# Patient Record
Sex: FEMALE | Race: BLACK OR AFRICAN AMERICAN | Employment: UNEMPLOYED | URBAN - METROPOLITAN AREA
[De-identification: names, ages, dates, MRNs, and addresses within clinical notes are randomized per-mention and may not be internally consistent; named-entity substitution may affect disease eponyms.]

---

## 2017-10-24 ENCOUNTER — HOSPITAL ENCOUNTER (OUTPATIENT)
Dept: ULTRASOUND IMAGING | Age: 32
Discharge: HOME | End: 2017-10-24
Attending: FAMILY MEDICINE
Payer: MEDICARE

## 2017-10-24 DIAGNOSIS — R19.09 ABDOMINAL WALL MASS OF SUPRAPUBIC REGION: ICD-10-CM

## 2017-10-24 PROCEDURE — 76856 US EXAM PELVIC COMPLETE: CPT

## 2021-09-02 ENCOUNTER — TRANSCRIBE ORDER (OUTPATIENT)
Dept: SCHEDULING | Age: 36
End: 2021-09-02

## 2021-09-02 DIAGNOSIS — Z93.1 FEEDING BY G-TUBE (HCC): Primary | ICD-10-CM

## 2021-09-02 DIAGNOSIS — R63.30 FEEDING DIFFICULTY: ICD-10-CM

## 2021-09-10 ENCOUNTER — HOSPITAL ENCOUNTER (OUTPATIENT)
Dept: INTERVENTIONAL RADIOLOGY/VASCULAR | Age: 36
Discharge: HOME | End: 2021-09-10
Attending: INTERNAL MEDICINE | Admitting: RADIOLOGY
Payer: COMMERCIAL

## 2021-09-10 VITALS
TEMPERATURE: 98.2 F | DIASTOLIC BLOOD PRESSURE: 80 MMHG | RESPIRATION RATE: 18 BRPM | OXYGEN SATURATION: 100 % | HEART RATE: 86 BPM | SYSTOLIC BLOOD PRESSURE: 108 MMHG

## 2021-09-10 DIAGNOSIS — R63.30 FEEDING DIFFICULTY: ICD-10-CM

## 2021-09-10 DIAGNOSIS — Z93.1 FEEDING BY G-TUBE (HCC): ICD-10-CM

## 2021-09-10 PROCEDURE — 74011000250 HC RX REV CODE- 250: Performed by: RADIOLOGY

## 2021-09-10 PROCEDURE — 49452 REPLACE G-J TUBE PERC: CPT

## 2021-09-10 PROCEDURE — 74011000636 HC RX REV CODE- 636: Performed by: RADIOLOGY

## 2021-09-10 RX ORDER — LIDOCAINE HYDROCHLORIDE 20 MG/ML
JELLY TOPICAL AS NEEDED
Status: DISCONTINUED | OUTPATIENT
Start: 2021-09-10 | End: 2021-09-10 | Stop reason: HOSPADM

## 2021-09-10 RX ORDER — IBUPROFEN 600 MG/1
600 TABLET ORAL
COMMUNITY
End: 2022-05-17

## 2021-09-10 RX ORDER — CLONAZEPAM 1 MG/1
1 TABLET ORAL 4 TIMES DAILY
COMMUNITY

## 2021-09-10 RX ORDER — UREA 10 %
2 LOTION (ML) TOPICAL
COMMUNITY

## 2021-09-10 RX ORDER — TRAZODONE HYDROCHLORIDE 50 MG/1
50 TABLET ORAL
COMMUNITY
End: 2022-05-09 | Stop reason: ALTCHOICE

## 2021-09-10 RX ADMIN — IOPAMIDOL 10 ML: 612 INJECTION, SOLUTION INTRAVENOUS at 14:50

## 2021-09-10 RX ADMIN — LIDOCAINE HYDROCHLORIDE 5 ML: 20 JELLY TOPICAL at 14:40

## 2021-09-11 NOTE — PROGRESS NOTES
Pt tolerated gastrostomy tube change without problems; consent obtained from Pj Arias, her legal guardian. VSS. Pt transferred to her w/c using the LIFT team service. Pt discharged to home with belongings via her w/c with her aunt.

## 2021-09-14 ENCOUNTER — TRANSCRIBE ORDER (OUTPATIENT)
Dept: SCHEDULING | Age: 36
End: 2021-09-14

## 2021-09-14 DIAGNOSIS — R19.02 LEFT UPPER QUADRANT ABDOMINAL MASS: Primary | ICD-10-CM

## 2021-09-23 ENCOUNTER — HOSPITAL ENCOUNTER (OUTPATIENT)
Dept: ULTRASOUND IMAGING | Age: 36
Discharge: HOME | End: 2021-09-23
Attending: FAMILY MEDICINE
Payer: COMMERCIAL

## 2021-09-23 DIAGNOSIS — R19.02 LEFT UPPER QUADRANT ABDOMINAL MASS: ICD-10-CM

## 2021-09-23 PROCEDURE — 76700 US EXAM ABDOM COMPLETE: CPT

## 2021-12-02 ENCOUNTER — TRANSCRIBE ORDER (OUTPATIENT)
Dept: SCHEDULING | Age: 36
End: 2021-12-02

## 2021-12-02 DIAGNOSIS — R19.02 ABDOMINAL MASS, LEFT UPPER QUADRANT: Primary | ICD-10-CM

## 2021-12-07 ENCOUNTER — HOSPITAL ENCOUNTER (OUTPATIENT)
Dept: CT IMAGING | Age: 36
Discharge: HOME | End: 2021-12-07
Attending: FAMILY MEDICINE
Payer: COMMERCIAL

## 2021-12-07 DIAGNOSIS — R19.02 ABDOMINAL MASS, LEFT UPPER QUADRANT: ICD-10-CM

## 2021-12-07 PROCEDURE — 74011000250 HC RX REV CODE- 250: Performed by: RADIOLOGY

## 2021-12-07 PROCEDURE — 74011000636 HC RX REV CODE- 636: Performed by: RADIOLOGY

## 2021-12-07 PROCEDURE — 74177 CT ABD & PELVIS W/CONTRAST: CPT

## 2021-12-07 RX ORDER — BARIUM SULFATE 20 MG/ML
450 SUSPENSION ORAL
Status: COMPLETED | OUTPATIENT
Start: 2021-12-07 | End: 2021-12-07

## 2021-12-07 RX ADMIN — IOPAMIDOL 100 ML: 755 INJECTION, SOLUTION INTRAVENOUS at 14:31

## 2021-12-07 RX ADMIN — BARIUM SULFATE 450 ML: 20 SUSPENSION ORAL at 14:31

## 2021-12-14 ENCOUNTER — HOSPITAL ENCOUNTER (OUTPATIENT)
Dept: NUTRITION | Age: 36
Discharge: HOME | End: 2021-12-14
Payer: COMMERCIAL

## 2021-12-14 PROCEDURE — 97802 MEDICAL NUTRITION INDIV IN: CPT | Performed by: DIETITIAN, REGISTERED

## 2021-12-14 NOTE — PROGRESS NOTES
Reta Childs was informed of the inherent limitations of a virtual visit,  and has consented to a virtual therapy visit on 2021. Information regarding emergency contact information for this patient during this visit is to contact:  Champ Macdonald in addition to calling 911. The patient was informed that at any time during the virtual visit, they can decide to stop the virtual visit. The patient verified that they are physically located in the [facility] for this virtual visit. [address] [facility]     Nutrition Assessment - Medical Nutrition Therapy   Outpatient Initial Evaluation         Patient Name: Reta Childs : 1985   Treatment Diagnosis: R63.5 (ICD-10-CM) - Weight gain   Referral Source: modesta Wynn* Start of Care [facility]): 2021     In time: 2:30pm           Out time:   3:30pm   Total Treatment Time (min):   60     Gender: female Age: 31 y.o. Ht: 67 in Wt: 135 (2019) lb  kg   BMI: 21.1 BMR   Male  BMR Female 1406     Past Medical History: There is no problem list on file for this patient. Pertinent Medications:     Current Outpatient Medications:     clonazePAM (KlonoPIN) 1 mg tablet, Take 1 mg by mouth four (4) times daily. , Disp: , Rfl:     traZODone (DESYREL) 50 mg tablet, Take 50 mg by mouth nightly. At 0330, Disp: , Rfl:     melatonin 1 mg tablet, Take 2 mg by mouth nightly., Disp: , Rfl:     ibuprofen (MOTRIN) 600 mg tablet, Take 600 mg by mouth as needed for Pain (for menstrual cramping). , Disp: , Rfl:     traZODone (DESYREL) 100 mg tablet, Take 100 mg by mouth nightly., Disp: , Rfl:     baclofen (LIORESAL) 20 mg tablet, Take 1 mg by mouth four (4) times daily. , Disp: , Rfl:   No longer getting miralax 1 time per week.  Reports no issues with constipation   Biochemical Data:   No results found for: HBA1C, ALU1CSJD, BUU9MBLB  Lab Results   Component Value Date/Time    Sodium 141 2016 08:27 PM Potassium 4.1 04/27/2016 08:27 PM    Chloride 105 04/27/2016 08:27 PM    CO2 27 04/27/2016 08:27 PM    Anion gap 9 04/27/2016 08:27 PM    Glucose 76 04/27/2016 08:27 PM    BUN 16 04/27/2016 08:27 PM    Creatinine 0.49 (L) 04/27/2016 08:27 PM    BUN/Creatinine ratio 33 (H) 04/27/2016 08:27 PM    GFR est AA >60 04/27/2016 08:27 PM    GFR est non-AA >60 04/27/2016 08:27 PM    Calcium 9.0 04/27/2016 08:27 PM    Bilirubin, total 0.4 04/27/2016 08:27 PM    Alk. phosphatase 63 04/27/2016 08:27 PM    Protein, total 8.0 04/27/2016 08:27 PM    Albumin 3.5 04/27/2016 08:27 PM    Globulin 4.5 (H) 04/27/2016 08:27 PM    A-G Ratio 0.8 (L) 04/27/2016 08:27 PM    ALT (SGPT) 24 04/27/2016 08:27 PM    AST (SGOT) 18 04/27/2016 08:27 PM     No results found for: CHOL, CHOLPOCT, CHOLX, CHLST, CHOLV, HDL, HDLPOC, HDLP, LDL, LDLCPOC, LDLC, DLDLP, VLDLC, VLDL, TGLX, TRIGL, TRIGP, TGLPOCT, CHHD, CHHDX     Assessment:   Kwaku Ko her aunt is with her. Pt is a 34yo femal here with nurse. On Jevity 1.2 for years (since 2001 or longer). notable increase in weight. Her 5 aunts are concerned. Weight gain estimated as of 10-15# over past 2 years. Weight stable prior to that. Clothing size has increased to XL from L and aunt notes her arms are larger than they were previously. Activity: wheel chair bound  [facility] and scootch around on her own. Has not done this in many years. Moves around he legs when in bed. No weight measured since 2019. Recently large fibroids found on CT. Unknown amount of weight from fibroids. IBW = 135#     Food & Nutrition:   Current nutrition = 7 boxes of Jevity 1.2 per day  Sometimes taking apple sauce by moutn just to taste. Sometimes getting only 6 in a day. Aunt notes this just started happening about 5-6 days ago. Pt will vocalize that she does not want any more according to aunt.      7 cans per day =   Calories = 1995 kcal  Protein = 92.4g  Carbohydrate =189.7g  Fiber = 26.6g  Fat=99.4g    6 cans per day =   Calories = 1710 kcal  Protein = 79.2g  Carbohydrate = 162.6g  Fiber = 25.8g   Fat = 85.2g       Estimate Needs = Equation( [] MSJ ; [x]  HBE (with IBW); [] Hyun; [] other)  * Activity Factor 1.2-1.3)   Calories: 2954-9632 Protein: 85 Carbs: 149 Fat: 85   Kcal/day  g/day  g/day  g/day        percent: 20  35  45               Nutrition Diagnosis Predicted Excessive energy intake R/T overfeeding of enteral nutrition AEB pt signaling to stop pump before full 7 cans, estimated weight increase (reported by aunt). Nutrition Intervention &  Education: Calculated nutritional needs using HBE with IBW (no recent weight available. Reported weight gain without measurement) x AF (1.2-1.3)   AF based on observed movements and physical limitations of pt during virtual session. Caregiver (aunt) noted minimal use of one arm. Estimated NEVIN = [id number]    Recommend reducing intake form 7 cans per day to 6 cans per day to meet energy needs and with input from caregiver stating that pt has been signaling she is full prior to 7th can for past week. 6 cans are already being provided for past week by caregiver without issue. Recommend continuing this change. Request new labs and weight to be able to better determine nutritional needs of pt beyond this adjustment. Handouts Provided: []  Carbohydrates  []  Protein  []  Non-starchy Vegatbles  []  Food Label  []  Meal and Snack Ideas  []  Food Journals []  Diabetes  []  Cholesterol  []  Sodium  []  Gen Nutr Guidelines  []  SBGM Guidelines  []  Others:   Information Reviewed with:  Aunt and pt   Readiness to Change Stage: []  Pre-contemplative    []  Contemplative  []  Preparation               [x]  Action  (already made this change prior to session)                []  Maintenance   Potential Barriers to Learning:                      []  Decline in memory    []  Language barrier   []  Other:  []  Emotional []  Limited mobility     []  None  []  Lack of motivation     [x] Vision, hearing or cognitive impairment (pt)   Expected Compliance: Change has already been made by aunt based on her perceived desires of pt. Nutritional Goal - To promote lifestyle changes to result in:    []  Weight loss  []  Improved diabetic control  []  Decreased cholesterol levels  []  Decreased blood pressure  [x]  Weight maintenance []  Preventing any interactions associated with food allergies  []  Adequate weight gain toward goal weight  []  Other:        Patient Goals:   No recent labs to determine if formulary change is needed. No recent weight. Using last known weight (which is equal to [place] IBW)    6 cans per day =   Calories = 1710 kcal  Protein = 79.2g  Carbohydrate = 241.2g   Fiber = 25.8g  Fat = 85.2g    Request new labs and weight to be able to better determine nutritional needs of pt beyond this adjustment. Dietitian Signature: Iris Hendrix MS, RD, CSSD Date: 12/14/2021   Follow-up: PRN Time: 2:39 PM   Jacobo Li is a 31 y.o. female being evaluated by a Virtual Visit (video visit) encounter to address concerns as mentioned above. A caregiver was present when appropriate. Due to this being a TeleHealth encounter (During [facility]-01 public health emergency), evaluation of the following areas was limited: Nutrition Focused Physical Exam. Pursuant to the emergency declaration under the [address] [place] [address], [address] [place] [address] authority and the [employer] Act, this Virtual Visit was conducted with patient's (and/or legal guardian's) consent, to reduce the risk of exposure to COVID-19 and provide necessary medical care. Services were provided through a video synchronous discussion virtually to substitute for in-person encounter. --Kishor Bey on 12/14/2021 at 2:39 PM    An electronic signature was used to authenticate this note.

## 2022-03-01 ENCOUNTER — APPOINTMENT (OUTPATIENT)
Dept: CT IMAGING | Age: 37
End: 2022-03-01
Attending: EMERGENCY MEDICINE
Payer: COMMERCIAL

## 2022-03-01 ENCOUNTER — HOSPITAL ENCOUNTER (EMERGENCY)
Age: 37
Discharge: HOME | End: 2022-03-01
Attending: EMERGENCY MEDICINE
Payer: COMMERCIAL

## 2022-03-01 VITALS
OXYGEN SATURATION: 100 % | DIASTOLIC BLOOD PRESSURE: 101 MMHG | RESPIRATION RATE: 18 BRPM | TEMPERATURE: 98.7 F | HEART RATE: 79 BPM | SYSTOLIC BLOOD PRESSURE: 122 MMHG

## 2022-03-01 DIAGNOSIS — R10.9 ACUTE ABDOMINAL PAIN: Primary | ICD-10-CM

## 2022-03-01 LAB
ALBUMIN SERPL-MCNC: 3 G/DL (ref 3.5–5)
ALBUMIN/GLOB SERPL: 0.6 {RATIO} (ref 1.1–2.2)
ALP SERPL-CCNC: 67 U/L (ref 45–117)
ALT SERPL-CCNC: 23 U/L (ref 12–78)
ANION GAP SERPL CALC-SCNC: 4 MMOL/L (ref 5–15)
AST SERPL-CCNC: 18 U/L (ref 15–37)
BASE EXCESS BLD CALC-SCNC: 0.7 MMOL/L
BASOPHILS # BLD: 0 K/UL (ref 0–0.1)
BASOPHILS NFR BLD: 1 % (ref 0–1)
BILIRUB SERPL-MCNC: 0.4 MG/DL (ref 0.2–1)
BUN SERPL-MCNC: 11 MG/DL (ref 6–20)
BUN/CREAT SERPL: 26 (ref 12–20)
CA-I BLD-MCNC: 1.3 MMOL/L (ref 1.12–1.32)
CALCIUM SERPL-MCNC: 9.5 MG/DL (ref 8.5–10.1)
CHLORIDE BLD-SCNC: 104 MMOL/L (ref 100–108)
CHLORIDE SERPL-SCNC: 106 MMOL/L (ref 97–108)
CO2 BLD-SCNC: 27 MMOL/L (ref 19–24)
CO2 SERPL-SCNC: 26 MMOL/L (ref 21–32)
CREAT SERPL-MCNC: 0.43 MG/DL (ref 0.55–1.02)
CREAT UR-MCNC: <0.3 MG/DL (ref 0.6–1.3)
DIFFERENTIAL METHOD BLD: ABNORMAL
EOSINOPHIL # BLD: 0.1 K/UL (ref 0–0.4)
EOSINOPHIL NFR BLD: 2 % (ref 0–7)
ERYTHROCYTE [DISTWIDTH] IN BLOOD BY AUTOMATED COUNT: 16.6 % (ref 11.5–14.5)
GLOBULIN SER CALC-MCNC: 4.7 G/DL (ref 2–4)
GLUCOSE BLD STRIP.AUTO-MCNC: 88 MG/DL (ref 74–106)
GLUCOSE SERPL-MCNC: 92 MG/DL (ref 65–100)
HCO3 BLDA-SCNC: 26 MMOL/L
HCT VFR BLD AUTO: 34.4 % (ref 35–47)
HGB BLD-MCNC: 10.7 G/DL (ref 11.5–16)
IMM GRANULOCYTES # BLD AUTO: 0 K/UL (ref 0–0.04)
IMM GRANULOCYTES NFR BLD AUTO: 0 % (ref 0–0.5)
LACTATE BLD-SCNC: 1.34 MMOL/L (ref 0.4–2)
LIPASE SERPL-CCNC: 82 U/L (ref 73–393)
LYMPHOCYTES # BLD: 1.4 K/UL (ref 0.8–3.5)
LYMPHOCYTES NFR BLD: 34 % (ref 12–49)
MCH RBC QN AUTO: 24.8 PG (ref 26–34)
MCHC RBC AUTO-ENTMCNC: 31.1 G/DL (ref 30–36.5)
MCV RBC AUTO: 79.8 FL (ref 80–99)
MONOCYTES # BLD: 0.6 K/UL (ref 0–1)
MONOCYTES NFR BLD: 16 % (ref 5–13)
NEUTS SEG # BLD: 1.9 K/UL (ref 1.8–8)
NEUTS SEG NFR BLD: 47 % (ref 32–75)
NRBC # BLD: 0 K/UL (ref 0–0.01)
NRBC BLD-RTO: 0 PER 100 WBC
PCO2 BLDV: 44.9 MMHG (ref 41–51)
PH BLDV: 7.38 [PH] (ref 7.32–7.42)
PLATELET # BLD AUTO: 304 K/UL (ref 150–400)
PMV BLD AUTO: 10.7 FL (ref 8.9–12.9)
PO2 BLDV: 27 MMHG (ref 25–40)
POTASSIUM BLD-SCNC: 5 MMOL/L (ref 3.5–5.5)
POTASSIUM SERPL-SCNC: 4.1 MMOL/L (ref 3.5–5.1)
PROT SERPL-MCNC: 7.7 G/DL (ref 6.4–8.2)
RBC # BLD AUTO: 4.31 M/UL (ref 3.8–5.2)
SODIUM BLD-SCNC: 141 MMOL/L (ref 136–145)
SODIUM SERPL-SCNC: 136 MMOL/L (ref 136–145)
SPECIMEN SITE: ABNORMAL
WBC # BLD AUTO: 4 K/UL (ref 3.6–11)

## 2022-03-01 PROCEDURE — 36415 COLL VENOUS BLD VENIPUNCTURE: CPT

## 2022-03-01 PROCEDURE — 74011250636 HC RX REV CODE- 250/636: Performed by: EMERGENCY MEDICINE

## 2022-03-01 PROCEDURE — 84132 ASSAY OF SERUM POTASSIUM: CPT

## 2022-03-01 PROCEDURE — 85025 COMPLETE CBC W/AUTO DIFF WBC: CPT

## 2022-03-01 PROCEDURE — 96375 TX/PRO/DX INJ NEW DRUG ADDON: CPT

## 2022-03-01 PROCEDURE — 74177 CT ABD & PELVIS W/CONTRAST: CPT

## 2022-03-01 PROCEDURE — 80053 COMPREHEN METABOLIC PANEL: CPT

## 2022-03-01 PROCEDURE — 74011000636 HC RX REV CODE- 636: Performed by: EMERGENCY MEDICINE

## 2022-03-01 PROCEDURE — C9113 INJ PANTOPRAZOLE SODIUM, VIA: HCPCS | Performed by: EMERGENCY MEDICINE

## 2022-03-01 PROCEDURE — 96374 THER/PROPH/DIAG INJ IV PUSH: CPT

## 2022-03-01 PROCEDURE — 99285 EMERGENCY DEPT VISIT HI MDM: CPT

## 2022-03-01 PROCEDURE — 83690 ASSAY OF LIPASE: CPT

## 2022-03-01 RX ORDER — MORPHINE SULFATE 2 MG/ML
4 INJECTION, SOLUTION INTRAMUSCULAR; INTRAVENOUS ONCE
Status: COMPLETED | OUTPATIENT
Start: 2022-03-01 | End: 2022-03-01

## 2022-03-01 RX ORDER — DICYCLOMINE HYDROCHLORIDE 20 MG/1
20 TABLET ORAL EVERY 6 HOURS
Qty: 20 TABLET | Refills: 0 | Status: SHIPPED | OUTPATIENT
Start: 2022-03-01 | End: 2022-05-25 | Stop reason: SDUPTHER

## 2022-03-01 RX ORDER — ONDANSETRON 2 MG/ML
4 INJECTION INTRAMUSCULAR; INTRAVENOUS ONCE
Status: COMPLETED | OUTPATIENT
Start: 2022-03-01 | End: 2022-03-01

## 2022-03-01 RX ORDER — FAMOTIDINE 20 MG/1
20 TABLET, FILM COATED ORAL 2 TIMES DAILY
Qty: 20 TABLET | Refills: 0 | Status: SHIPPED | OUTPATIENT
Start: 2022-03-01 | End: 2022-03-11

## 2022-03-01 RX ORDER — PANTOPRAZOLE SODIUM 40 MG/10ML
40 INJECTION, POWDER, LYOPHILIZED, FOR SOLUTION INTRAVENOUS
Status: COMPLETED | OUTPATIENT
Start: 2022-03-01 | End: 2022-03-01

## 2022-03-01 RX ADMIN — MORPHINE SULFATE 4 MG: 2 INJECTION, SOLUTION INTRAMUSCULAR; INTRAVENOUS at 13:32

## 2022-03-01 RX ADMIN — PANTOPRAZOLE SODIUM 40 MG: 40 INJECTION, POWDER, FOR SOLUTION INTRAVENOUS at 13:32

## 2022-03-01 RX ADMIN — ONDANSETRON 4 MG: 2 INJECTION INTRAMUSCULAR; INTRAVENOUS at 13:32

## 2022-03-01 RX ADMIN — SODIUM CHLORIDE 500 ML: 9 INJECTION, SOLUTION INTRAVENOUS at 13:32

## 2022-03-01 RX ADMIN — IOPAMIDOL 100 ML: 755 INJECTION, SOLUTION INTRAVENOUS at 14:03

## 2022-03-01 NOTE — DISCHARGE INSTRUCTIONS
It was a pleasure taking care of you at [facility] Emergency Department today. We know that when you come to [facility], you are entrusting us with your health, comfort, and safety. Our physicians and nurses honor that trust, and we truly appreciate the opportunity to care for you and your loved ones. We also value your feedback. If you receive a survey about your Emergency Department experience today, please fill it out. We care about our patients' feedback, and we listen to what you have to say. Thank you!

## 2022-03-01 NOTE — ED TRIAGE NOTES
Patient arriving from home with Aunt (caregiver) who states that patient has had new abdominal pain recently, making patient more irritable than normal.   Patient currently on menstrual cycle with known hx of multiple uterine fibroids. Aunt also mentions that patient has PEG tube, and she has noticed dried blood around stoma. Aunt also would like patient to have a \"physical\" because she has not had one in years.

## 2022-03-01 NOTE — ED PROVIDER NOTES
EMERGENCY DEPARTMENT HISTORY AND PHYSICAL EXAM      Date: 3/1/2022  Patient Name: Zelalem Chatterjee    History of Presenting Illness     Chief Complaint   Patient presents with    Abdominal Pain       History Provided By: Caregiver    HPI: Zelalem Chatterjee, 31 y.o. female with a past medical history significant for cerebral palsy, nonverbal, history of uterine fibroids, medical problems as stated below presents to the ED with cc of severe abdominal pain, distention, discomfort over the last several days. Patient has feeding tube in place and blood has been coming from the tube at times. Patient's abdomen seems more distended and patient seems more constipated as well. She is requiring fleets enemas to help with her bowel movements. She has known fibroids that are very large and have been imaged back in the beginning of December 2021. She called her doctor today and they told her to come emergency room for evaluation. It is very limited due to patient's underlying medical conditions. No other associated symptoms. No other exacerbating or mounting factors. There are no other complaints, changes, or physical findings at this time. PCP: Deejay Rey MD    No current facility-administered medications on file prior to encounter. Current Outpatient Medications on File Prior to Encounter   Medication Sig Dispense Refill    clonazePAM (KlonoPIN) 1 mg tablet Take 1 mg by mouth four (4) times daily.  traZODone (DESYREL) 50 mg tablet Take 50 mg by mouth nightly. At 0330      melatonin 1 mg tablet Take 2 mg by mouth nightly.  ibuprofen (MOTRIN) 600 mg tablet Take 600 mg by mouth as needed for Pain (for menstrual cramping).  traZODone (DESYREL) 100 mg tablet Take 100 mg by mouth nightly.  baclofen (LIORESAL) 20 mg tablet Take 1 mg by mouth four (4) times daily.          Past History     Past Medical History:  Past Medical History:   Diagnosis Date    Aspiration into respiratory tract     Asthma     [facility]     [facility]     Skull fractures ([facility]     @ birth       Past Surgical History:  Past Surgical History:   Procedure Laterality Date    HX GI      feeding tube; nipson to help prevent asipration    IR REPLACE GASTRO/JEJUNO TUBE PERC  9/10/2021       Family History:  No family history on file. Social History:  Social History     Tobacco Use    Smoking status: Never Smoker    Smokeless tobacco: Never Used   Substance Use Topics    Alcohol use: No    Drug use: No       Allergies:  No Known Allergies      Review of Systems   Review of Systems   Constitutional: Negative for chills, diaphoresis, fatigue and fever. HENT: Negative for ear pain and sore throat. Eyes: Negative for pain and redness. Respiratory: Negative for cough and shortness of breath. Cardiovascular: Negative for chest pain and leg swelling. Gastrointestinal: Positive for abdominal distention, abdominal pain and constipation. Negative for diarrhea, nausea and vomiting. Endocrine: Negative for cold intolerance and heat intolerance. Genitourinary: Negative for flank pain and hematuria. Musculoskeletal: Negative for back pain and neck stiffness. Skin: Negative for rash and wound. Neurological: Negative for dizziness, syncope and headaches. All other systems reviewed and are negative. Physical Exam   Physical Exam  Vitals and nursing note reviewed. Constitutional:       General: She is in acute distress. Appearance: She is well-developed. She is not ill-appearing. Comments: Elevated BMI, nonverbal, intermittently moaning, at baseline per caregiver mental status   HENT:      Head: Normocephalic and atraumatic. Mouth/Throat:      Pharynx: No oropharyngeal exudate. Eyes:      Conjunctiva/sclera: Conjunctivae normal.      Pupils: Pupils are equal, round, and reactive to light. Cardiovascular:      Rate and Rhythm: Normal rate and regular rhythm.       Heart sounds: No murmur heard. Pulmonary:      Effort: Pulmonary effort is normal. No respiratory distress. Breath sounds: Normal breath sounds. No wheezing. Abdominal:      General: Bowel sounds are normal. There is distension. Palpations: Abdomen is soft. Tenderness: There is generalized abdominal tenderness. There is no right CVA tenderness, left CVA tenderness, guarding or rebound. Comments: G-tube in place, no bleeding appreciated on my exam   Musculoskeletal:         General: No deformity. Normal range of motion. Cervical back: Normal range of motion. Skin:     General: Skin is warm and dry. Findings: No rash. Neurological:      Mental Status: She is alert and oriented to person, place, and time. Coordination: Coordination normal.   Psychiatric:         Behavior: Behavior normal.         Diagnostic Study Results     Labs -     Recent Results (from the past 24 hour(s))   CBC WITH AUTOMATED DIFF    Collection Time: 03/01/22  1:24 PM   Result Value Ref Range    WBC 4.0 3.6 - 11.0 K/uL    RBC 4.31 3.80 - 5.20 M/uL    HGB 10.7 (L) 11.5 - 16.0 g/dL    HCT 34.4 (L) 35.0 - 47.0 %    MCV 79.8 (L) 80.0 - 99.0 FL    MCH 24.8 (L) 26.0 - 34.0 PG    MCHC 31.1 30.0 - 36.5 g/dL    RDW 16.6 (H) 11.5 - 14.5 %    PLATELET 240 319 - 158 K/uL    MPV 10.7 8.9 - 12.9 FL    NRBC 0.0 0  WBC    ABSOLUTE NRBC 0.00 0.00 - 0.01 K/uL    NEUTROPHILS 47 32 - 75 %    LYMPHOCYTES 34 12 - 49 %    MONOCYTES 16 (H) 5 - 13 %    EOSINOPHILS 2 0 - 7 %    BASOPHILS 1 0 - 1 %    IMMATURE GRANULOCYTES 0 0.0 - 0.5 %    ABS. NEUTROPHILS 1.9 1.8 - 8.0 K/UL    ABS. LYMPHOCYTES 1.4 0.8 - 3.5 K/UL    ABS. MONOCYTES 0.6 0.0 - 1.0 K/UL    ABS. EOSINOPHILS 0.1 0.0 - 0.4 K/UL    ABS. BASOPHILS 0.0 0.0 - 0.1 K/UL    ABS. IMM.  GRANS. 0.0 0.00 - 0.04 K/UL    DF AUTOMATED     METABOLIC PANEL, COMPREHENSIVE    Collection Time: 03/01/22  1:24 PM   Result Value Ref Range    Sodium 136 136 - 145 mmol/L    Potassium 4.1 3.5 - 5.1 mmol/L    Chloride 106 97 - 108 mmol/L    CO2 26 21 - 32 mmol/L    Anion gap 4 (L) 5 - 15 mmol/L    Glucose 92 65 - 100 mg/dL    BUN 11 6 - 20 MG/DL    Creatinine 0.43 (L) 0.55 - 1.02 MG/DL    BUN/Creatinine ratio 26 (H) 12 - 20      GFR est AA >60 >60 ml/min/1.73m2    GFR est non-AA >60 >60 ml/min/1.73m2    Calcium 9.5 8.5 - 10.1 MG/DL    Bilirubin, total 0.4 0.2 - 1.0 MG/DL    ALT (SGPT) 23 12 - 78 U/L    AST (SGOT) 18 15 - 37 U/L    Alk. phosphatase 67 45 - 117 U/L    Protein, total 7.7 6.4 - 8.2 g/dL    Albumin 3.0 (L) 3.5 - 5.0 g/dL    Globulin 4.7 (H) 2.0 - 4.0 g/dL    A-G Ratio 0.6 (L) 1.1 - 2.2     LIPASE    Collection Time: 03/01/22  1:24 PM   Result Value Ref Range    Lipase 82 73 - 393 U/L   BLOOD GAS,CHEM8,LACTIC ACID POC    Collection Time: 03/01/22  1:26 PM   Result Value Ref Range    Calcium, ionized (POC) 1.30 1.12 - 1.32 mmol/L    BICARBONATE 26 mmol/L    Base excess (POC) 0.7 mmol/L    Sample source VENOUS BLOOD      CO2, POC 27 (H) 19 - 24 MMOL/L    Sodium,  136 - 145 MMOL/L    Potassium, POC 5.0 3.5 - 5.5 MMOL/L    Chloride,  100 - 108 MMOL/L    Glucose, POC 88 74 - 106 MG/DL    Creatinine, POC <0.3 (L) 0.6 - 1.3 MG/DL    Lactic Acid (POC) 1.34 0.40 - 2.00 mmol/L    pH, venous (POC) 7.38 7.32 - 7.42      pCO2, venous (POC) 44.9 41 - 51 MMHG    pO2, venous (POC) 27 25 - 40 mmHg       Radiologic Studies -   CT ABD PELV W CONT   Final Result   No acute findings. Enlarged leiomyomatous uterus. CT Results  (Last 48 hours)               03/01/22 1403  CT ABD PELV W CONT Final result    Impression:  No acute findings. Enlarged leiomyomatous uterus. Narrative:  EXAM: CT ABD PELV W CONT       INDICATION: Patient with history of gastrostomy tube, severe abdominal   distention and pain, constipation, history of significant fibroids       COMPARISON: December 7, 2021        CONTRAST: 100 mL of Isovue-370.        ORAL CONTRAST: Yes       TECHNIQUE:    Following the uneventful intravenous administration of contrast, thin axial   images were obtained through the abdomen and pelvis. Coronal and sagittal   reconstructions were generated. CT dose reduction was achieved through use of a   standardized protocol tailored for this examination and automatic exposure   control for dose modulation. FINDINGS:    LOWER THORAX: Minimal atelectasis or scar in the left lower lobe. LIVER: No mass. BILIARY TREE: Gallbladder is within normal limits. CBD is not dilated. SPLEEN: within normal limits. PANCREAS: No mass or ductal dilatation. ADRENALS: Unremarkable. KIDNEYS: No mass, calculus, or hydronephrosis. STOMACH: Gastrostomy tube balloon in the region of the mid stomach. SMALL BOWEL: No dilatation or wall thickening. COLON: No dilatation or wall thickening. APPENDIX: Not identified. PERITONEUM: No ascites or pneumoperitoneum. RETROPERITONEUM: No lymphadenopathy or aortic aneurysm. REPRODUCTIVE ORGANS: Enlarged leiomyomatous uterus, 21 cm in length. URINARY BLADDER: No mass or calculus. BONES: No destructive bone lesion. ABDOMINAL WALL: No mass or hernia. ADDITIONAL COMMENTS: N/A               CXR Results  (Last 48 hours)    None            Medical Decision Making   I am the first provider for this patient. I reviewed the vital signs, available nursing notes, past medical history, past surgical history, family history and social history. Vital Signs-Reviewed the patient's vital signs. Patient Vitals for the past 12 hrs:   Temp Pulse Resp BP SpO2   03/01/22 1255 98.7 °F (37.1 °C) 79 18 (!) 122/101 100 %       Records Reviewed: Nursing records and medical records reviewed    MDM:  Pt presents with acute abdominal pain; vital signs stable with currently a non-peritoneal exam; DDx includes: Gastroenteritis, hepatitis, pancreatitis, obstruction, appendicitis, viral illness, IBD, diverticulitis, mesenteric ischemia, AAA or descending dissection, ACS, kidney stone. Will get labs, treat symptomatically and obtain serial abdominal exams to determine if a CT is warranted. Will reassess and monitor closely. Provider Notes (Medical Decision Making):   Patient is a 19-year-old female with known cerebral palsy presenting with acute exacerbation of her chronic abdominal pain. Patient does seem to have some dyspepsia, differential includes intestinal colic, gastritis, versus gastric ulcer. Patient has a mild anemia when compared to prior, but I do see no history of any bloody stools and there is only a mild amount of blood that was pulled back through the G-tube. Currently there is no blood appreciated in the tube. At this time, I think starting the patient on a bland her diet and starting the patient on antiacid is prudent and have the patient follow-up as an outpatient with her primary care doctor. If the symptoms continue or worsen, the patient will return to the ER for admission and possible endoscopy. ED Course:   Initial assessment performed. The patients presenting problems have been discussed, and they are in agreement with the care plan formulated and outlined with them. I have encouraged them to ask questions as they arise throughout their visit. Critical Care:  None      Disposition:  5:45 PM  Adelina Veliz's  results have been reviewed with her. She has been counseled regarding her diagnosis. She verbally conveys understanding and agreement of the signs, symptoms, diagnosis, treatment and prognosis and additionally agrees to follow up as recommended with Dr. Todd Rader MD in 24 - 48 hours. She also agrees with the care-plan and conveys that all of her questions have been answered.   I have also put together some discharge instructions for her that include: 1) educational information regarding their diagnosis, 2) how to care for their diagnosis at home, as well a 3) list of reasons why they would want to return to the ED prior to their follow-up appointment, should their condition change. DISCHARGE PLAN:  1. Discharge Medication List as of 3/1/2022  3:44 PM      START taking these medications    Details   dicyclomine (BENTYL) 20 mg tablet Take 1 Tablet by mouth every six (6) hours. , Normal, Disp-20 Tablet, R-0      famotidine (Pepcid) 20 mg tablet Take 1 Tablet by mouth two (2) times a day for 10 days. , Normal, Disp-20 Tablet, R-0         CONTINUE these medications which have NOT CHANGED    Details   clonazePAM (KlonoPIN) 1 mg tablet Take 1 mg by mouth four (4) times daily. , Historical Med      !! traZODone (DESYREL) 50 mg tablet Take 50 mg by mouth nightly. At 0330, Historical Med      melatonin 1 mg tablet Take 2 mg by mouth nightly., Historical Med      ibuprofen (MOTRIN) 600 mg tablet Take 600 mg by mouth as needed for Pain (for menstrual cramping). , Historical Med      !! traZODone (DESYREL) 100 mg tablet Take 100 mg by mouth nightly., Historical Med      baclofen (LIORESAL) 20 mg tablet Take 1 mg by mouth four (4) times daily. , Historical Med       !! - Potential duplicate medications found. Please discuss with provider. 2.   Follow-up Information     Follow up With Specialties Details Why Contact Shavonne Cho MD Family Medicine In 3 days For a follow-up evaluation. [address]  [phone]      [facility] EMERGENCY DEPT Emergency Medicine In 2 days If symptoms worsen [address]  [address] [facility]  [phone]        3. Return to ED if worse     Diagnosis     Clinical Impression:   1. Acute abdominal pain        Attestations:    Gab Maguire MD    Please note that this dictation was completed with [employer], the [employer] voice recognition software. Quite often unanticipated grammatical, syntax, homophones, and other interpretive errors are inadvertently transcribed by the computer software. Please disregard these errors.   Please excuse any errors that have escaped final proofreading. Thank you.

## 2022-03-14 ENCOUNTER — TELEPHONE (OUTPATIENT)
Dept: NUTRITION | Age: 37
End: 2022-03-14

## 2022-03-14 NOTE — TELEPHONE ENCOUNTER
Pt's Aunt Caregiver LVM previously for RD. Aunt confirmed pt's  and name. RD returned phone call. Pt recently seen in ER 3/1/22 for abdominal pain. No wt taken. New medications from that visit: Bentyle and Pepcid,   Current Outpatient Medications   Medication Instructions    baclofen (LIORESAL) 1 mg, Oral, 4 TIMES DAILY    clonazePAM (KLONOPIN) 1 mg, Oral, 4 TIMES DAILY    dicyclomine (BENTYL) 20 mg, Oral, EVERY 6 HOURS    ibuprofen (MOTRIN) 600 mg, Oral, AS NEEDED    melatonin 2 mg, Oral, EVERY BEDTIME    traZODone (DESYREL) 100 mg, Oral, EVERY BEDTIME    traZODone (DESYREL) 50 mg, Oral, EVERY BEDTIME, At 0330        Aunt giving her chicken broth through tube 400mL low sodium with water. Continues to have issues with constipation. Previously reduced from 7 cans per day to 6 cans. Past week she has not been getting 6 can. Getting 2 cans for past few days. Starting yesterday able to tolerate 4 cans per day. Anemia noted during ER visit. 6 cans per day would provide >100% RDA for iron . Aunt notes concern for her iron and A1C. No results found for: IRON, FE, TIBC, IBCT, PSAT, FERR  Lab Results   Component Value Date/Time    WBC 4.0 2022 01:24 PM    HGB 10.7 (L) 2022 01:24 PM    HCT 34.4 (L) 2022 01:24 PM    PLATELET 210  01:24 PM    MCV 79.8 (L) 2022 01:24 PM     Labs from PCP getting faxed over for RD to review. Nasir Medina continues to notice blood around her tube, but notes it is less than previously. No blood in diapers. Will review for formulary recommendations.      Ewelina Villarreal, MS, RD, CSSD

## 2022-03-18 ENCOUNTER — TELEPHONE (OUTPATIENT)
Dept: NUTRITION | Age: 37
End: 2022-03-18

## 2022-03-18 NOTE — TELEPHONE ENCOUNTER
Confirmed name and  with aunt  Pt still experiencing stomach distention and bloating. Aunt notes it stays bloated. Will be following up with GI soon. Continues having constipation. Only able to do 4 cans per day currently. Chicken broth and water. 400ml before and after each feeding. Now giving Pedialyte when feeling overly full. Pt has always needed help with bowel movement. Currently using miralax 3 times per week. Pt can take miralax every other day and up to 1 time per day. Recommend liquid iron supplement every other day for low iron. Possible recheck in 6 weeks. Recommend liquid vitamin D supplement as well based on last labs (media section of chart). Lab Results   Component Value Date/Time    WBC 4.0 2022 01:24 PM    HGB 10.7 (L) 2022 01:24 PM    HCT 34.4 (L) 2022 01:24 PM    PLATELET 789  01:24 PM    MCV 79.8 (L) 2022 01:24 PM     Continue working towards 6 cans per day based on pt's tolerance. RD will follow up as needed.     Ewelina Medina, MS, RD, CSSD

## 2022-05-09 ENCOUNTER — APPOINTMENT (OUTPATIENT)
Dept: GENERAL RADIOLOGY | Age: 37
DRG: 871 | End: 2022-05-09
Attending: EMERGENCY MEDICINE
Payer: MEDICARE

## 2022-05-09 ENCOUNTER — HOSPITAL ENCOUNTER (INPATIENT)
Age: 37
LOS: 8 days | Discharge: HOME | DRG: 871 | End: 2022-05-17
Attending: EMERGENCY MEDICINE | Admitting: INTERNAL MEDICINE
Payer: MEDICARE

## 2022-05-09 DIAGNOSIS — J18.9 COMMUNITY ACQUIRED PNEUMONIA OF LEFT LOWER LOBE OF LUNG: ICD-10-CM

## 2022-05-09 DIAGNOSIS — A41.9 SEPSIS WITHOUT ACUTE ORGAN DYSFUNCTION, DUE TO UNSPECIFIED ORGANISM (HCC): Primary | ICD-10-CM

## 2022-05-09 DIAGNOSIS — R50.9 FEBRILE ILLNESS, ACUTE: ICD-10-CM

## 2022-05-09 LAB
ALBUMIN SERPL-MCNC: 2.9 G/DL (ref 3.5–5)
ALBUMIN/GLOB SERPL: 0.6 {RATIO} (ref 1.1–2.2)
ALP SERPL-CCNC: 65 U/L (ref 45–117)
ALT SERPL-CCNC: 19 U/L (ref 12–78)
ANION GAP SERPL CALC-SCNC: 7 MMOL/L (ref 5–15)
APPEARANCE UR: ABNORMAL
AST SERPL-CCNC: 20 U/L (ref 15–37)
B PERT DNA SPEC QL NAA+PROBE: NOT DETECTED
BACTERIA URNS QL MICRO: ABNORMAL /HPF
BASE DEFICIT BLD-SCNC: 4.1 MMOL/L
BASOPHILS # BLD: 0 K/UL (ref 0–0.1)
BASOPHILS NFR BLD: 0 % (ref 0–1)
BILIRUB SERPL-MCNC: 1 MG/DL (ref 0.2–1)
BILIRUB UR QL CFM: NEGATIVE
BORDETELLA PARAPERTUSSIS PCR, BORPAR: NOT DETECTED
BUN SERPL-MCNC: 10 MG/DL (ref 6–20)
BUN/CREAT SERPL: 16 (ref 12–20)
C PNEUM DNA SPEC QL NAA+PROBE: NOT DETECTED
CA-I BLD-MCNC: 1.15 MMOL/L (ref 1.12–1.32)
CALCIUM SERPL-MCNC: 8.7 MG/DL (ref 8.5–10.1)
CHLORIDE BLD-SCNC: 104 MMOL/L (ref 100–108)
CHLORIDE SERPL-SCNC: 105 MMOL/L (ref 97–108)
CO2 BLD-SCNC: 19 MMOL/L (ref 19–24)
CO2 SERPL-SCNC: 22 MMOL/L (ref 21–32)
COLOR UR: ABNORMAL
COVID-19 RAPID TEST, COVR: NOT DETECTED
CREAT SERPL-MCNC: 0.61 MG/DL (ref 0.55–1.02)
CREAT UR-MCNC: 0.5 MG/DL (ref 0.6–1.3)
DIFFERENTIAL METHOD BLD: ABNORMAL
EOSINOPHIL # BLD: 0 K/UL (ref 0–0.4)
EOSINOPHIL NFR BLD: 0 % (ref 0–7)
EPITH CASTS URNS QL MICRO: ABNORMAL /LPF
ERYTHROCYTE [DISTWIDTH] IN BLOOD BY AUTOMATED COUNT: 17.6 % (ref 11.5–14.5)
FLUAV AG NPH QL IA: NEGATIVE
FLUAV H1 2009 PAND RNA SPEC QL NAA+PROBE: NOT DETECTED
FLUAV H1 RNA SPEC QL NAA+PROBE: NOT DETECTED
FLUAV H3 RNA SPEC QL NAA+PROBE: NOT DETECTED
FLUAV SUBTYP SPEC NAA+PROBE: NOT DETECTED
FLUBV AG NOSE QL IA: NEGATIVE
FLUBV RNA SPEC QL NAA+PROBE: NOT DETECTED
GLOBULIN SER CALC-MCNC: 4.7 G/DL (ref 2–4)
GLUCOSE BLD STRIP.AUTO-MCNC: 104 MG/DL (ref 74–106)
GLUCOSE SERPL-MCNC: 116 MG/DL (ref 65–100)
GLUCOSE UR STRIP.AUTO-MCNC: NEGATIVE MG/DL
HADV DNA SPEC QL NAA+PROBE: NOT DETECTED
HCG SERPL QL: NEGATIVE
HCO3 BLDA-SCNC: 19 MMOL/L
HCOV 229E RNA SPEC QL NAA+PROBE: NOT DETECTED
HCOV HKU1 RNA SPEC QL NAA+PROBE: NOT DETECTED
HCOV NL63 RNA SPEC QL NAA+PROBE: NOT DETECTED
HCOV OC43 RNA SPEC QL NAA+PROBE: NOT DETECTED
HCT VFR BLD AUTO: 34.8 % (ref 35–47)
HGB BLD-MCNC: 10.8 G/DL (ref 11.5–16)
HGB UR QL STRIP: ABNORMAL
HMPV RNA SPEC QL NAA+PROBE: NOT DETECTED
HPIV1 RNA SPEC QL NAA+PROBE: NOT DETECTED
HPIV2 RNA SPEC QL NAA+PROBE: NOT DETECTED
HPIV3 RNA SPEC QL NAA+PROBE: NOT DETECTED
HPIV4 RNA SPEC QL NAA+PROBE: NOT DETECTED
IMM GRANULOCYTES # BLD AUTO: 0 K/UL (ref 0–0.04)
IMM GRANULOCYTES NFR BLD AUTO: 0 % (ref 0–0.5)
KETONES UR QL STRIP.AUTO: NEGATIVE MG/DL
LACTATE BLD-SCNC: 0.87 MMOL/L (ref 0.4–2)
LEUKOCYTE ESTERASE UR QL STRIP.AUTO: ABNORMAL
LYMPHOCYTES # BLD: 0.6 K/UL (ref 0.8–3.5)
LYMPHOCYTES NFR BLD: 7 % (ref 12–49)
M PNEUMO DNA SPEC QL NAA+PROBE: NOT DETECTED
MCH RBC QN AUTO: 25.1 PG (ref 26–34)
MCHC RBC AUTO-ENTMCNC: 31 G/DL (ref 30–36.5)
MCV RBC AUTO: 80.9 FL (ref 80–99)
MONOCYTES # BLD: 0.9 K/UL (ref 0–1)
MONOCYTES NFR BLD: 10 % (ref 5–13)
NEUTS SEG # BLD: 7.5 K/UL (ref 1.8–8)
NEUTS SEG NFR BLD: 83 % (ref 32–75)
NITRITE UR QL STRIP.AUTO: POSITIVE
NRBC # BLD: 0 K/UL (ref 0–0.01)
NRBC BLD-RTO: 0 PER 100 WBC
PCO2 BLDV: 27.5 MMHG (ref 41–51)
PH BLDV: 7.44 [PH] (ref 7.32–7.42)
PH UR STRIP: 6.5 [PH] (ref 5–8)
PLATELET # BLD AUTO: 284 K/UL (ref 150–400)
PMV BLD AUTO: 11.2 FL (ref 8.9–12.9)
PO2 BLDV: 45 MMHG (ref 25–40)
POTASSIUM BLD-SCNC: 3.3 MMOL/L (ref 3.5–5.5)
POTASSIUM SERPL-SCNC: 3.7 MMOL/L (ref 3.5–5.1)
PROT SERPL-MCNC: 7.6 G/DL (ref 6.4–8.2)
PROT UR STRIP-MCNC: 300 MG/DL
RBC # BLD AUTO: 4.3 M/UL (ref 3.8–5.2)
RBC #/AREA URNS HPF: ABNORMAL /HPF (ref 0–5)
RBC MORPH BLD: ABNORMAL
RSV RNA SPEC QL NAA+PROBE: NOT DETECTED
RV+EV RNA SPEC QL NAA+PROBE: NOT DETECTED
SARS-COV-2 PCR, COVPCR: NOT DETECTED
SODIUM BLD-SCNC: 136 MMOL/L (ref 136–145)
SODIUM SERPL-SCNC: 134 MMOL/L (ref 136–145)
SOURCE, COVRS: NORMAL
SP GR UR REFRACTOMETRY: 1.02
SPECIMEN SITE: ABNORMAL
TROPONIN-HIGH SENSITIVITY: 6 NG/L (ref 0–51)
UA: UC IF INDICATED,UAUC: ABNORMAL
UROBILINOGEN UR QL STRIP.AUTO: 1 EU/DL (ref 0.2–1)
WBC # BLD AUTO: 9 K/UL (ref 3.6–11)
WBC URNS QL MICRO: ABNORMAL /HPF (ref 0–4)

## 2022-05-09 PROCEDURE — 93005 ELECTROCARDIOGRAM TRACING: CPT

## 2022-05-09 PROCEDURE — 0202U NFCT DS 22 TRGT SARS-COV-2: CPT

## 2022-05-09 PROCEDURE — 65270000046 HC RM TELEMETRY

## 2022-05-09 PROCEDURE — 87086 URINE CULTURE/COLONY COUNT: CPT

## 2022-05-09 PROCEDURE — 81001 URINALYSIS AUTO W/SCOPE: CPT

## 2022-05-09 PROCEDURE — 74011250636 HC RX REV CODE- 250/636: Performed by: INTERNAL MEDICINE

## 2022-05-09 PROCEDURE — 74011250637 HC RX REV CODE- 250/637: Performed by: EMERGENCY MEDICINE

## 2022-05-09 PROCEDURE — 71045 X-RAY EXAM CHEST 1 VIEW: CPT

## 2022-05-09 PROCEDURE — 99285 EMERGENCY DEPT VISIT HI MDM: CPT

## 2022-05-09 PROCEDURE — 82947 ASSAY GLUCOSE BLOOD QUANT: CPT

## 2022-05-09 PROCEDURE — 80053 COMPREHEN METABOLIC PANEL: CPT

## 2022-05-09 PROCEDURE — 84703 CHORIONIC GONADOTROPIN ASSAY: CPT

## 2022-05-09 PROCEDURE — 87804 INFLUENZA ASSAY W/OPTIC: CPT

## 2022-05-09 PROCEDURE — 84484 ASSAY OF TROPONIN QUANT: CPT

## 2022-05-09 PROCEDURE — 74011250636 HC RX REV CODE- 250/636: Performed by: EMERGENCY MEDICINE

## 2022-05-09 PROCEDURE — 85025 COMPLETE CBC W/AUTO DIFF WBC: CPT

## 2022-05-09 PROCEDURE — 74011250637 HC RX REV CODE- 250/637: Performed by: INTERNAL MEDICINE

## 2022-05-09 PROCEDURE — 87635 SARS-COV-2 COVID-19 AMP PRB: CPT

## 2022-05-09 PROCEDURE — 74011000250 HC RX REV CODE- 250: Performed by: INTERNAL MEDICINE

## 2022-05-09 PROCEDURE — 36415 COLL VENOUS BLD VENIPUNCTURE: CPT

## 2022-05-09 PROCEDURE — 87040 BLOOD CULTURE FOR BACTERIA: CPT

## 2022-05-09 PROCEDURE — 74011000258 HC RX REV CODE- 258: Performed by: EMERGENCY MEDICINE

## 2022-05-09 PROCEDURE — 96365 THER/PROPH/DIAG IV INF INIT: CPT

## 2022-05-09 RX ORDER — SODIUM CHLORIDE 0.9 % (FLUSH) 0.9 %
5-10 SYRINGE (ML) INJECTION AS NEEDED
Status: DISCONTINUED | OUTPATIENT
Start: 2022-05-09 | End: 2022-05-17 | Stop reason: HOSPADM

## 2022-05-09 RX ORDER — BACLOFEN 10 MG/1
20 TABLET ORAL
Status: DISCONTINUED | OUTPATIENT
Start: 2022-05-09 | End: 2022-05-17 | Stop reason: HOSPADM

## 2022-05-09 RX ORDER — ONDANSETRON 2 MG/ML
4 INJECTION INTRAMUSCULAR; INTRAVENOUS
Status: DISCONTINUED | OUTPATIENT
Start: 2022-05-09 | End: 2022-05-17 | Stop reason: HOSPADM

## 2022-05-09 RX ORDER — ONDANSETRON 4 MG/1
4 TABLET, ORALLY DISINTEGRATING ORAL
Status: DISCONTINUED | OUTPATIENT
Start: 2022-05-09 | End: 2022-05-17 | Stop reason: HOSPADM

## 2022-05-09 RX ORDER — SODIUM CHLORIDE 0.9 % (FLUSH) 0.9 %
5-40 SYRINGE (ML) INJECTION AS NEEDED
Status: DISCONTINUED | OUTPATIENT
Start: 2022-05-09 | End: 2022-05-17 | Stop reason: HOSPADM

## 2022-05-09 RX ORDER — METRONIDAZOLE 500 MG/100ML
500 INJECTION, SOLUTION INTRAVENOUS EVERY 12 HOURS
Status: DISCONTINUED | OUTPATIENT
Start: 2022-05-09 | End: 2022-05-16

## 2022-05-09 RX ORDER — CLONAZEPAM 1 MG/1
1 TABLET ORAL 4 TIMES DAILY
Status: DISCONTINUED | OUTPATIENT
Start: 2022-05-09 | End: 2022-05-17 | Stop reason: HOSPADM

## 2022-05-09 RX ORDER — ENOXAPARIN SODIUM 100 MG/ML
40 INJECTION SUBCUTANEOUS DAILY
Status: DISCONTINUED | OUTPATIENT
Start: 2022-05-10 | End: 2022-05-17 | Stop reason: HOSPADM

## 2022-05-09 RX ORDER — DICYCLOMINE HYDROCHLORIDE 20 MG/1
20 TABLET ORAL EVERY 6 HOURS
Status: DISCONTINUED | OUTPATIENT
Start: 2022-05-09 | End: 2022-05-17 | Stop reason: HOSPADM

## 2022-05-09 RX ORDER — ACETAMINOPHEN 650 MG/1
650 SUPPOSITORY RECTAL
Status: DISCONTINUED | OUTPATIENT
Start: 2022-05-09 | End: 2022-05-17 | Stop reason: HOSPADM

## 2022-05-09 RX ORDER — SODIUM CHLORIDE 0.9 % (FLUSH) 0.9 %
5-40 SYRINGE (ML) INJECTION EVERY 8 HOURS
Status: DISCONTINUED | OUTPATIENT
Start: 2022-05-09 | End: 2022-05-17 | Stop reason: HOSPADM

## 2022-05-09 RX ORDER — ACETAMINOPHEN 500 MG
1000 TABLET ORAL
Status: DISCONTINUED | OUTPATIENT
Start: 2022-05-09 | End: 2022-05-09 | Stop reason: CLARIF

## 2022-05-09 RX ORDER — ACETAMINOPHEN 325 MG/1
650 TABLET ORAL
Status: DISCONTINUED | OUTPATIENT
Start: 2022-05-09 | End: 2022-05-17 | Stop reason: HOSPADM

## 2022-05-09 RX ORDER — SODIUM CHLORIDE 9 MG/ML
100 INJECTION, SOLUTION INTRAVENOUS CONTINUOUS
Status: DISCONTINUED | OUTPATIENT
Start: 2022-05-09 | End: 2022-05-10

## 2022-05-09 RX ORDER — ACETAMINOPHEN 325 MG/1
650 TABLET ORAL
Status: DISCONTINUED | OUTPATIENT
Start: 2022-05-09 | End: 2022-05-09 | Stop reason: SDUPTHER

## 2022-05-09 RX ORDER — ONDANSETRON 2 MG/ML
4 INJECTION INTRAMUSCULAR; INTRAVENOUS
Status: DISCONTINUED | OUTPATIENT
Start: 2022-05-09 | End: 2022-05-09 | Stop reason: SDUPTHER

## 2022-05-09 RX ORDER — TRAZODONE HYDROCHLORIDE 50 MG/1
50 TABLET ORAL
Status: DISCONTINUED | OUTPATIENT
Start: 2022-05-09 | End: 2022-05-17 | Stop reason: HOSPADM

## 2022-05-09 RX ORDER — SODIUM CHLORIDE 9 MG/ML
140 INJECTION, SOLUTION INTRAVENOUS CONTINUOUS
Status: DISCONTINUED | OUTPATIENT
Start: 2022-05-09 | End: 2022-05-09

## 2022-05-09 RX ORDER — BENZONATATE 100 MG/1
100 CAPSULE ORAL
Status: DISCONTINUED | OUTPATIENT
Start: 2022-05-09 | End: 2022-05-17 | Stop reason: HOSPADM

## 2022-05-09 RX ORDER — POLYETHYLENE GLYCOL 3350 17 G/17G
17 POWDER, FOR SOLUTION ORAL DAILY PRN
Status: DISCONTINUED | OUTPATIENT
Start: 2022-05-09 | End: 2022-05-17 | Stop reason: HOSPADM

## 2022-05-09 RX ORDER — IPRATROPIUM BROMIDE AND ALBUTEROL SULFATE 2.5; .5 MG/3ML; MG/3ML
3 SOLUTION RESPIRATORY (INHALATION)
Status: DISCONTINUED | OUTPATIENT
Start: 2022-05-09 | End: 2022-05-17 | Stop reason: HOSPADM

## 2022-05-09 RX ADMIN — CLONAZEPAM 1 MG: 1 TABLET ORAL at 13:16

## 2022-05-09 RX ADMIN — SODIUM CHLORIDE, PRESERVATIVE FREE 10 ML: 5 INJECTION INTRAVENOUS at 22:08

## 2022-05-09 RX ADMIN — SODIUM CHLORIDE 140 ML/HR: 9 INJECTION, SOLUTION INTRAVENOUS at 22:39

## 2022-05-09 RX ADMIN — METRONIDAZOLE 500 MG: 500 INJECTION, SOLUTION INTRAVENOUS at 13:14

## 2022-05-09 RX ADMIN — METRONIDAZOLE 500 MG: 500 INJECTION, SOLUTION INTRAVENOUS at 22:08

## 2022-05-09 RX ADMIN — SODIUM CHLORIDE 140 ML/HR: 9 INJECTION, SOLUTION INTRAVENOUS at 14:32

## 2022-05-09 RX ADMIN — DICYCLOMINE HYDROCHLORIDE 20 MG: 20 TABLET ORAL at 13:16

## 2022-05-09 RX ADMIN — CEFTRIAXONE SODIUM 2 G: 2 INJECTION, POWDER, FOR SOLUTION INTRAMUSCULAR; INTRAVENOUS at 11:03

## 2022-05-09 RX ADMIN — CLONAZEPAM 1 MG: 1 TABLET ORAL at 18:20

## 2022-05-09 RX ADMIN — DICYCLOMINE HYDROCHLORIDE 20 MG: 20 TABLET ORAL at 18:20

## 2022-05-09 RX ADMIN — CLONAZEPAM 1 MG: 1 TABLET ORAL at 22:08

## 2022-05-09 RX ADMIN — TRAZODONE HYDROCHLORIDE 50 MG: 50 TABLET ORAL at 22:08

## 2022-05-09 RX ADMIN — SODIUM CHLORIDE, PRESERVATIVE FREE 10 ML: 5 INJECTION INTRAVENOUS at 14:00

## 2022-05-09 RX ADMIN — AZITHROMYCIN MONOHYDRATE 500 MG: 500 INJECTION, POWDER, LYOPHILIZED, FOR SOLUTION INTRAVENOUS at 12:07

## 2022-05-09 RX ADMIN — ACETAMINOPHEN 1000 MG: 160 SOLUTION ORAL at 09:55

## 2022-05-09 NOTE — ED PROVIDER NOTES
EMERGENCY DEPARTMENT HISTORY AND PHYSICAL EXAM      Date: 5/9/2022  Patient Name: Dahlia Grant    History of Presenting Illness     Chief Complaint   Patient presents with    Blood infection     per pt care taker, pt has decreased in mental state since yesterday,        History Provided By: Patient's aunt/caregiver    HPI: Dahlia Grant, 31 y.o. female with history of cerebral palsy, nonverbal at baseline, seizure disorder presents to the ED with cc of fever. Patient had decreased activity and decreased appetite yesterday. Aunt notes that she had some diarrhea and did not eat a few meals yesterday. No nausea or vomiting. She developed a fever this morning. No known sick contacts. Patient nonverbal at baseline and unable to verbalize if she is in any pain or discomfort. She has had increased work of breathing but without any cough. No recent hospitalizations. Remainder of HPI and ROS unable to be obtained given patient's nonverbal baseline status. All history obtained by aunt/caregiver at bedside. There are no other complaints, changes, or physical findings at this time. PCP: Shanti Ferguson MD    No current facility-administered medications on file prior to encounter. Current Outpatient Medications on File Prior to Encounter   Medication Sig Dispense Refill    dicyclomine (BENTYL) 20 mg tablet Take 1 Tablet by mouth every six (6) hours. 20 Tablet 0    clonazePAM (KlonoPIN) 1 mg tablet Take 1 mg by mouth four (4) times daily. DISSOLVE ONE TABLET BY MOUTH IN THE AFTERNOON AND 3 TABLETS EVERY NIGHT AT BEDTIME FOR INSOMNIA      melatonin 1 mg tablet Take 2 mg by mouth nightly.  ibuprofen (MOTRIN) 600 mg tablet Take 600 mg by mouth every six (6) hours as needed for Pain (for menstrual cramping).  traZODone (DESYREL) 100 mg tablet Take 150 mg by mouth nightly.  TAKE 1 TABLET BY MOUTH AT 8 PM AND 1/2 TABLET BY MOUTH AT 3 AM      baclofen (LIORESAL) 20 mg tablet Take 1 mg by mouth four (4) times daily.  [DISCONTINUED] traZODone (DESYREL) 50 mg tablet Take 50 mg by mouth nightly. At 0330         Past History     Past Medical History:  Past Medical History:   Diagnosis Date    Aspiration into respiratory tract     Asthma     [facility] [address]     Seizure ([facility] [address]     Skull fractures ([facility] [address]     @ birth       Past Surgical History:  Past Surgical History:   Procedure Laterality Date    HX GI      feeding tube; nipson to help prevent asipration    IR REPLACE GASTRO/JEJUNO TUBE PERC  9/10/2021       Family History:  Family History   Problem Relation Age of Onset    No Known Problems Mother     No Known Problems Father        Social History:  Social History     Tobacco Use    Smoking status: Never Smoker    Smokeless tobacco: Never Used   Substance Use Topics    Alcohol use: No    Drug use: No       Allergies:  No Known Allergies      Review of Systems   Review of Systems   Unable to perform ROS: Patient nonverbal       Physical Exam   Physical Exam  Vitals and nursing note reviewed. Constitutional:       General: She is not in acute distress. Appearance: Normal appearance. She is ill-appearing. HENT:      Head: Normocephalic and atraumatic. Nose: Nose normal.      Mouth/Throat:      Mouth: Mucous membranes are moist.   Eyes:      Extraocular Movements: Extraocular movements intact. Pupils: Pupils are equal, round, and reactive to light. Cardiovascular:      Rate and Rhythm: Regular rhythm. Tachycardia present. Heart sounds: No murmur heard. Pulmonary:      Effort: No respiratory distress. Breath sounds: Normal breath sounds. No wheezing. Comments: Tachypnea with increased work of breathing. Lungs clear to auscultation bilaterally. Abdominal:      General: There is no distension. Palpations: Abdomen is soft. Tenderness: There is no abdominal tenderness. There is no guarding or rebound.       Comments: No grimacing with palpation of the abdomen   Musculoskeletal:         General: No swelling or tenderness. Normal range of motion. Cervical back: Normal range of motion and neck supple. Right lower leg: No edema. Left lower leg: No edema. Skin:     General: Skin is warm and dry. Coloration: Skin is not pale. Findings: No erythema. Neurological:      General: No focal deficit present. Mental Status: She is alert. Mental status is at baseline. Comments: Moves all extremities         Diagnostic Study Results     Labs -     Recent Results (from the past 12 hour(s))   COVID-19 RAPID TEST    Collection Time: 05/09/22 10:03 AM   Result Value Ref Range    Specimen source Nasopharyngeal      COVID-19 rapid test Not detected NOTD     INFLUENZA A+B VIRAL AGS    Collection Time: 05/09/22 10:03 AM   Result Value Ref Range    Influenza A Antigen Negative NEG      Influenza B Antigen Negative NEG     METABOLIC PANEL, COMPREHENSIVE    Collection Time: 05/09/22 10:46 AM   Result Value Ref Range    Sodium 134 (L) 136 - 145 mmol/L    Potassium 3.7 3.5 - 5.1 mmol/L    Chloride 105 97 - 108 mmol/L    CO2 22 21 - 32 mmol/L    Anion gap 7 5 - 15 mmol/L    Glucose 116 (H) 65 - 100 mg/dL    BUN 10 6 - 20 MG/DL    Creatinine 0.61 0.55 - 1.02 MG/DL    BUN/Creatinine ratio 16 12 - 20      GFR est AA >60 >60 ml/min/1.73m2    GFR est non-AA >60 >60 ml/min/1.73m2    Calcium 8.7 8.5 - 10.1 MG/DL    Bilirubin, total 1.0 0.2 - 1.0 MG/DL    ALT (SGPT) 19 12 - 78 U/L    AST (SGOT) 20 15 - 37 U/L    Alk.  phosphatase 65 45 - 117 U/L    Protein, total 7.6 6.4 - 8.2 g/dL    Albumin 2.9 (L) 3.5 - 5.0 g/dL    Globulin 4.7 (H) 2.0 - 4.0 g/dL    A-G Ratio 0.6 (L) 1.1 - 2.2     CBC WITH AUTOMATED DIFF    Collection Time: 05/09/22 10:46 AM   Result Value Ref Range    WBC 9.0 3.6 - 11.0 K/uL    RBC 4.30 3.80 - 5.20 M/uL    HGB 10.8 (L) 11.5 - 16.0 g/dL    HCT 34.8 (L) 35.0 - 47.0 %    MCV 80.9 80.0 - 99.0 FL    MCH 25.1 (L) 26.0 - 34.0 PG MCHC 31.0 30.0 - 36.5 g/dL    RDW 17.6 (H) 11.5 - 14.5 %    PLATELET 317 754 - 679 K/uL    MPV 11.2 8.9 - 12.9 FL    NRBC 0.0 0  WBC    ABSOLUTE NRBC 0.00 0.00 - 0.01 K/uL    NEUTROPHILS 83 (H) 32 - 75 %    LYMPHOCYTES 7 (L) 12 - 49 %    MONOCYTES 10 5 - 13 %    EOSINOPHILS 0 0 - 7 %    BASOPHILS 0 0 - 1 %    IMMATURE GRANULOCYTES 0 0.0 - 0.5 %    ABS. NEUTROPHILS 7.5 1.8 - 8.0 K/UL    ABS. LYMPHOCYTES 0.6 (L) 0.8 - 3.5 K/UL    ABS. MONOCYTES 0.9 0.0 - 1.0 K/UL    ABS. EOSINOPHILS 0.0 0.0 - 0.4 K/UL    ABS. BASOPHILS 0.0 0.0 - 0.1 K/UL    ABS. IMM.  GRANS. 0.0 0.00 - 0.04 K/UL    DF SMEAR SCANNED      RBC COMMENTS ANISOCYTOSIS  1+        RBC COMMENTS POLYCHROMASIA  PRESENT        RBC COMMENTS MALIA CELLS  PRESENT        RBC COMMENTS HYPOCHROMIA  PRESENT       TROPONIN-HIGH SENSITIVITY    Collection Time: 05/09/22 10:46 AM   Result Value Ref Range    Troponin-High Sensitivity 6 0 - 51 ng/L   HCG QL SERUM    Collection Time: 05/09/22 10:46 AM   Result Value Ref Range    HCG, Ql. Negative NEG     EKG, 12 LEAD, INITIAL    Collection Time: 05/09/22 10:50 AM   Result Value Ref Range    Ventricular Rate 114 BPM    Atrial Rate 114 BPM    P-R Interval 146 ms    QRS Duration 70 ms    Q-T Interval 328 ms    QTC Calculation (Bezet) 452 ms    Calculated P Axis 28 degrees    Calculated R Axis 44 degrees    Calculated T Axis 12 degrees    Diagnosis       Sinus tachycardia  Nonspecific T wave abnormality  No previous ECGs available     BLOOD GAS,CHEM8,LACTIC ACID POC    Collection Time: 05/09/22 10:59 AM   Result Value Ref Range    Calcium, ionized (POC) 1.15 1.12 - 1.32 mmol/L    BICARBONATE 19 mmol/L    Base deficit (POC) 4.1 mmol/L    Sample source VENOUS BLOOD      CO2, POC 19 19 - 24 MMOL/L    Sodium,  136 - 145 MMOL/L    Potassium, POC 3.3 (L) 3.5 - 5.5 MMOL/L    Chloride,  100 - 108 MMOL/L    Glucose,  74 - 106 MG/DL    Creatinine, POC 0.5 (L) 0.6 - 1.3 MG/DL    Lactic Acid (POC) 0.87 0.40 - 2.00 mmol/L    pH, venous (POC) 7.44 (H) 7.32 - 7.42      pCO2, venous (POC) 27.5 (L) 41 - 51 MMHG    pO2, venous (POC) 45 (H) 25 - 40 mmHg   URINALYSIS W/ REFLEX CULTURE    Collection Time: 05/09/22  1:25 PM    Specimen: Urine    Urine specimen   Result Value Ref Range    Color DARK YELLOW      Appearance TURBID (A) CLEAR      Specific gravity 1.019      pH (UA) 6.5 5.0 - 8.0      Protein 300 (A) NEG mg/dL    Glucose Negative NEG mg/dL    Ketone Negative NEG mg/dL    Blood LARGE (A) NEG      Urobilinogen 1.0 0.2 - 1.0 EU/dL    Nitrites Positive (A) NEG      Leukocyte Esterase LARGE (A) NEG      WBC 20-50 0 - 4 /hpf    RBC 5-10 0 - 5 /hpf    Epithelial cells FEW FEW /lpf    Bacteria 4+ (A) NEG /hpf    UA:UC IF INDICATED URINE CULTURE ORDERED (A) CNI     BILIRUBIN, CONFIRM    Collection Time: 05/09/22  1:25 PM   Result Value Ref Range    Bilirubin UA, confirm Negative         Radiologic Studies -   XR CHEST PORT   Final Result   Low lung volumes with possible left lower lobe infiltrate. CT Results  (Last 48 hours)    None        CXR Results  (Last 48 hours)               05/09/22 0940  XR CHEST PORT Final result    Impression:  Low lung volumes with possible left lower lobe infiltrate. Narrative: Indication: Decreased appetite, fever       Comparison: None       Portable exam of the chest obtained at 940 demonstrates normal heart size. Low   lung volumes with possible left lower lobe infiltrate. The osseous structures   are unremarkable. Medical Decision Making   I am the first provider for this patient. I reviewed the vital signs, available nursing notes, past medical history, past surgical history, family history and social history. Vital Signs-Reviewed the patient's vital signs.   Patient Vitals for the past 12 hrs:   Temp Pulse Resp BP SpO2   05/09/22 1630 98.9 °F (37.2 °C) 97 (!) 32 103/73 99 %   05/09/22 1600 -- 95 (!) 34 100/65 99 %   05/09/22 1500 -- 97 (!) 32 102/69 98 % 05/09/22 1400 -- 98 (!) 33 93/68 97 %   05/09/22 1330 -- 99 (!) 32 98/61 97 %   05/09/22 1300 99.5 °F (37.5 °C) 98 (!) 31 98/71 96 %   05/09/22 1200 -- (!) 102 (!) 33 102/67 98 %   05/09/22 1100 (!) 101.4 °F (38.6 °C) (!) 114 (!) 35 114/73 97 %   05/09/22 0902 -- (!) 121 (!) 34 113/77 96 %   05/09/22 0851 (!) 103.2 °F (39.6 °C) (!) 118 (!) 32 (!) 119/95 96 %       Records Reviewed: Nursing Notes    Provider Notes (Medical Decision Making):   19-year-old female with above history presenting with fever, decreased p.o. intake, decreased activity, diarrhea. She presents febrile, tachycardic, tachypneic but normotensive. Exam as above. Sepsis protocol initiated. ED [facility] ED SEPSIS NOTE:     9:33 AM The patient now meets criteria for: Severe Sepsis    1. Fluid resuscitation with: Patient does not require a 30cc/kg bolus because they do not meet criteria for septic shock (SBP<90 or decreased by >40 mmHG from baseline, MAP<65, Lactate 4 or greater). 2. Due to concern for rapidly advancing infection and deterioration of patient's condition, antibiotics are started STAT and cultures ordered. I've performed a sepsis reassessment of the patient's clinical volume status and tissue perfusion at time 1145AM        ED Course:   Initial assessment performed. The patients presenting problems have been discussed, and they are in agreement with the care plan formulated and outlined with them. I have encouraged them to ask questions as they arise throughout their visit. ED Course as of 05/09/22 1654   Mon May 09, 2022   1119 EKG per my interpretation sinus tachycardia, rate 114 bpm, normal axis, no acute ischemic changes or interval changes. [AK]      ED Course User Index  [AK] Bimal Mcdermott MD         Cardiac Monitoring:   The cardiac monitor revealed the following rhythm as interpreted by me: Sinus Tachycardia, rate 115 bpm  The cardiac monitor was ordered secondary to the patient's reported complaint of altered mental status and to monitor the patient for dysrhythmia. Alonso Crowe MD      Admission Note:  Patient is being admitted to the hospital by Dr. Nasim Velazquez, Service: Hospitalist.  The results of their tests and reasons for their admission have been discussed with them and available family. They convey agreement and understanding for the need to be admitted and for their admission diagnosis. Critical Care Documentation  I have spent 34 minutes of critical care time in evaluating and treating this patient. This includes time spent at bedside, time with family and decision makers, documentation, review of labs and imaging, and/or consultation with specialists. It does not include time spent on separately billed procedures. This patient presents with a critical illness or injury that acutely impairs one or more vital organ systems such that there is a high probability of imminent or life threatening deterioration in the patient's condition. This case involved decision making of high complexity to assess, manipulate, and support vital organ system failure and/or to prevent further life threatening deterioration of the patient's condition. Failure to initiate these interventions on an urgent basis would likely result in sudden, clinically significant or life threatening deterioration in the patient's condition. This case required my direct attention, intervention, and personal management for the time documented above. This time does not include separately billed interventions/procedures which are separately documented.      Abnormal findings supporting critical care: Acute sepsis secondary to pneumonia and UTI resulting in fever, tachycardia, tachypnea  Interventions to support critical care: Administration of empiric IV antibiotics, IV fluid resuscitation, frequent reassessment of hemodynamics and respiratory status  Failure to intervene may result in: Respiratory failure, hemodynamic instability and collapse, progression and septic shock, death  Grecia Briceno MD      Disposition:  Admit      Diagnosis     Clinical Impression:   1. Sepsis without acute organ dysfunction, due to unspecified organism ([address]    2. Community acquired pneumonia of left lower lobe of lung    3. Febrile illness, acute        Attestations:  I am the first and primary provider of record for this patient's ED encounter. I personally performed the services described above in this documentation. Grecia Briceno MD    Please note that this dictation was completed with [employer], the computer voice recognition software. Quite often unanticipated grammatical, syntax, homophones, and other interpretive errors are inadvertently transcribed by the computer software. Please disregard these errors. Please excuse any errors that have escaped final proofreading. Thank you.

## 2022-05-09 NOTE — ED NOTES
Bedside shift change report given to John RN (oncoming nurse) by [address] (offgoing nurse). Report included the following information SBAR, ED Summary, Intake/Output, MAR and Recent Results.

## 2022-05-09 NOTE — PROGRESS NOTES
Pharmacy Medication Reconciliation     The patient was interviewed regarding current PTA medication list, use and drug allergies. She was not a reliable historian so used prescription fill history (RxQuery) to close some gaps. The patient was questioned regarding use of any other inhalers, topical products, over the counter medications, herbal medications, vitamin products or ophthalmic/nasal/otic medication use. Allergy Update: Patient has no known allergies. Recommendations/Findings: The following amendments were made to the patient's active medication list on file at [facility]:   1) Additions: N/A    2) Deletions: trazodone 50 mg    3) Changes:  Trazodone 100 mg from 1 tablet daily to 1 tablet in morning and 1/2 tablet in evening      Pertinent Findings: N/A    Clarified PTA med list with patient and RxQuery. PTA medication list was corrected to the following:     Prior to Admission Medications   Prescriptions Last Dose Informant Taking?   baclofen (LIORESAL) 20 mg tablet   Yes   Sig: Take 1 mg by mouth four (4) times daily. clonazePAM (KlonoPIN) 1 mg tablet   Yes   Sig: Take 1 mg by mouth four (4) times daily. DISSOLVE ONE TABLET BY MOUTH IN THE AFTERNOON AND 3 TABLETS EVERY NIGHT AT BEDTIME FOR INSOMNIA   dicyclomine (BENTYL) 20 mg tablet   Yes   Sig: Take 1 Tablet by mouth every six (6) hours. ibuprofen (MOTRIN) 600 mg tablet   Yes   Sig: Take 600 mg by mouth every six (6) hours as needed for Pain (for menstrual cramping). melatonin 1 mg tablet   Yes   Sig: Take 2 mg by mouth nightly. traZODone (DESYREL) 100 mg tablet   Yes   Sig: Take 150 mg by mouth nightly.  TAKE 1 TABLET BY MOUTH AT 8 PM AND 1/2 TABLET BY MOUTH AT 3 AM      Facility-Administered Medications: None        Thank you,  EMILEE Whatley

## 2022-05-09 NOTE — ED NOTES
TRANSITION OF CARE - SBAR OUT    Patient is being transferred to [facility] General Surgery, Room# 2139. Report GIVEN TO Carlos Gallagher RN on Wilver Ramirez for routine progression of care. Report is consisted of the following information SBAR, ED Summary, Intake/Output, MAR and Recent Results. Patient transferred to receiving unit by: Transport (RN or Tech Name)     Called outstanding consults: Yes   Collected routine labs: Yes     All current orders reviewed with accepting nurse: Yes    The following personal items will be sent with the patient during transfer to the floor: All valuables:                          CARDIAC MONITORING ORDERED: No      The following CURRENT information were reported to the receiving RN:    CODE STATUS: Full Code    NIH SCORE:    DANIE SCREENING:      NEURO ASSESSMENT: Neuro  Neurologic State: Alert (05/09/22 1017)  Orientation Level: Unable to verbalize (05/09/22 1017)  Cognition: Unable to assess (comment),No command following (pt is nonverbal) (05/09/22 1017)  Speech:  (nonverbal) (05/09/22 1017)      RESTRAINTS IN USE: No      IS DOCUMENTATION COMPLETE: Yes      Vital Signs  Level of Consciousness: Alert (0) (05/09/22 1630)  Temp: 98.9 °F (37.2 °C) (05/09/22 1630)  Temp Source: Axillary (05/09/22 1630)  Pulse (Heart Rate): 97 (05/09/22 1630)  Cardiac Rhythm: Sinus Tachy (05/09/22 1017)  Resp Rate: (!) 32 (05/09/22 1630)  BP: 103/73 (05/09/22 1630)  MAP (Monitor): 83 (05/09/22 1630)  MAP (Calculated): 83 (05/09/22 1630)  BP 1 Location: Left upper arm (05/09/22 0851)  MEWS Score: 3 (05/09/22 1630)         OXYGEN: Oxygen Therapy  O2 Device: None (Room air) (05/09/22 1016)    AZ FALL RISK:        WOUNDS: No      URINARY CATHETER: incontinent    LINE ACCESS:   Peripheral IV 05/09/22 Right;Upper Cephalic (Active)        Opportunity for questions and clarification were provided.   Renu Rojas RN

## 2022-05-09 NOTE — H&P
Hospitalist Admission Note    NAME: Lesli Shelley   :  1985   MRN:  [id number]     Date/Time:  2022 12:09 PM    Patient PCP: Abran Donato MD  ______________________________________________________________________  Given the patient's current clinical presentation, I have a high level of concern for decompensation if discharged from the emergency department. Complex decision making was performed, which includes reviewing the patient's available past medical records, laboratory results, and x-ray films. My assessment of this patient's clinical condition and my plan of care is as follows. Assessment / Plan:  Sepsis (Tm 103.4, tachycardia , RR35)  CAP  Hx of aspiration pna  Diarrhea   Pt is not hypoxic  CXR showed low lung volumes with possible left lower lobe infiltrate  Rapid covid and influenza a/b neg  Send Covid19 PCR (pt is not vaccinated)  Awaiting on UA  Start IV fluids  Will start IV rocephin/aztihromycin/flagyl  Send stool studies  Nebs, tessalon perls prn  O2 suppl prn    Seizure disorder, not on seizure meds  CP, nonverbal at baseline  Dysphagia, has a G tube, will consult nutrition to help with TF  Pharmacy to help with med rec    I spoke to pt's aunt-caregiver at bedside    Code Status: Full  Surrogate Decision Maker: pt's aunt  DVT Prophylaxis: lovenox  GI Prophylaxis: not indicated  Baseline:       Subjective:   CHIEF COMPLAINT: poor appetite    HISTORY OF PRESENT ILLNESS:     Lesli Shelley is a 31 y.o.  female with PMHx significant for CP, nonverbal at baseline, has a G tube, seizure disorder presents to the ER for evaluation of poor appetite and po intake. Her aunt noticed increased work of breathing but denies any fever or cough. In the ER, pt is febrile with Tm 103.2 and pna was seen on CXR. She was given empiric IV abx. Vitals/labs/imaging reviewed. We were asked to admit for work up and evaluation of the above problems.      Past Medical History:   Diagnosis Date    Aspiration into respiratory tract     Asthma     [facility]     Seizure ([facility]     Skull fractures ([facility]     @ birth        Past Surgical History:   Procedure Laterality Date    HX GI      feeding tube; nipson to help prevent asipration    IR REPLACE GASTRO/JEJUNO TUBE PERC  9/10/2021       Social History     Tobacco Use    Smoking status: Never Smoker    Smokeless tobacco: Never Used   Substance Use Topics    Alcohol use: No        Family History   Problem Relation Age of Onset    No Known Problems Mother     No Known Problems Father      No Known Allergies     Prior to Admission medications    Medication Sig Start Date End Date Taking? Authorizing Provider   dicyclomine (BENTYL) 20 mg tablet Take 1 Tablet by mouth every six (6) hours. 3/1/22   Andre Rossi MD   clonazePAM (KlonoPIN) 1 mg tablet Take 1 mg by mouth four (4) times daily. Provider, Historical   traZODone (DESYREL) 50 mg tablet Take 50 mg by mouth nightly. At 0330    Provider, Historical   melatonin 1 mg tablet Take 2 mg by mouth nightly. Provider, Historical   ibuprofen (MOTRIN) 600 mg tablet Take 600 mg by mouth as needed for Pain (for menstrual cramping). Provider, Historical   traZODone (DESYREL) 100 mg tablet Take 100 mg by mouth nightly. Dewayne Braden MD   baclofen (LIORESAL) 20 mg tablet Take 1 mg by mouth four (4) times daily. Dewayne Braden MD       REVIEW OF SYSTEMS:     I am not able to complete the review of systems because:    The patient is intubated and sedated    The patient has altered mental status due to his acute medical problems    The patient has baseline aphasia from prior stroke(s)    The patient has baseline dementia and is not reliable historian    The patient is in acute medical distress and unable to provide information   xx Pt is nonverbal at baseline       Total of 12 systems reviewed as follows:       POSITIVE= BOLD text  Negative = text not BOLD  General:  fever, chills, sweats, generalized weakness, weight loss/gain,      loss of appetite   Eyes:    blurred vision, eye pain, loss of vision, double vision  ENT:    rhinorrhea, pharyngitis   Respiratory:   cough, sputum production, SOB, PRATT, wheezing, pleuritic pain   Cardiology:   chest pain, palpitations, orthopnea, PND, edema, syncope   Gastrointestinal:  abdominal pain , N/V, diarrhea, dysphagia, constipation, bleeding   Genitourinary:  frequency, urgency, dysuria, hematuria, incontinence   Muskuloskeletal :  arthralgia, myalgia, back pain  Hematology:  easy bruising, nose or gum bleeding, lymphadenopathy   Dermatological: rash, ulceration, pruritis, color change / jaundice  Endocrine:   hot flashes or polydipsia   Neurological:  headache, dizziness, confusion, focal weakness, paresthesia,     Speech difficulties, memory loss, gait difficulty  Psychological: Feelings of anxiety, depression, agitation    Objective:   VITALS:    Visit Vitals  /73   Pulse (!) 114   Temp (!) 101.4 °F (38.6 °C)   Resp (!) 35   Ht 5' 7\" (1.702 m)   Wt 74.8 kg (165 lb)   SpO2 97%   BMI 25.84 kg/m²       PHYSICAL EXAM:    General:    Pt resting in bed, nad. HEENT: Atraumatic, anicteric sclerae, pink conjunctivae     No oral ulcers, mucosa moist, throat clear  Neck:  Supple, symmetrical,  thyroid: non tender  Lungs:   CTA b/l. No wheezing or Rhonchi. No rales. Chest wall:  No tenderness. No accessory muscle use. Heart:   Regular  rhythm,  No  Murmur. No edema  Abdomen:   Soft, NT. ND  BS+  Extremities: No cyanosis. No clubbing,      Skin turgor normal, Radial dial pulse 2+. Capillary refill normal  Skin:     Not pale. Not Jaundiced  No rashes   Psych:  Not depressed. Not anxious or agitated.   Neurologic: Resting, nonverbal    _______________________________________________________________________  Care Plan discussed with:    Comments   Patient     Family      RN x    Care Manager Consultant:  beth ED physician   _______________________________________________________________________  Expected  Disposition:   Home with Family    HH/PT/OT/RN x   SNF/LTC    LENARD    ________________________________________________________________________  TOTAL TIME:  72 Minutes    Critical Care Provided     Minutes non procedure based      Comments    x Reviewed previous records   >50% of visit spent in counseling and coordination of care x Discussion with patient and/or family and questions answered       ________________________________________________________________________  Signed: Kelsey Laureano MD    Procedures: see electronic medical records for all procedures/Xrays and details which were not copied into this note but were reviewed prior to creation of Plan. LAB DATA REVIEWED:    Recent Results (from the past 24 hour(s))   COVID-19 RAPID TEST    Collection Time: 05/09/22 10:03 AM   Result Value Ref Range    Specimen source Nasopharyngeal      COVID-19 rapid test Not detected NOTD     INFLUENZA A+B VIRAL AGS    Collection Time: 05/09/22 10:03 AM   Result Value Ref Range    Influenza A Antigen Negative NEG      Influenza B Antigen Negative NEG     METABOLIC PANEL, COMPREHENSIVE    Collection Time: 05/09/22 10:46 AM   Result Value Ref Range    Sodium 134 (L) 136 - 145 mmol/L    Potassium 3.7 3.5 - 5.1 mmol/L    Chloride 105 97 - 108 mmol/L    CO2 22 21 - 32 mmol/L    Anion gap 7 5 - 15 mmol/L    Glucose 116 (H) 65 - 100 mg/dL    BUN 10 6 - 20 MG/DL    Creatinine 0.61 0.55 - 1.02 MG/DL    BUN/Creatinine ratio 16 12 - 20      GFR est AA >60 >60 ml/min/1.73m2    GFR est non-AA >60 >60 ml/min/1.73m2    Calcium 8.7 8.5 - 10.1 MG/DL    Bilirubin, total 1.0 0.2 - 1.0 MG/DL    ALT (SGPT) 19 12 - 78 U/L    AST (SGOT) 20 15 - 37 U/L    Alk.  phosphatase 65 45 - 117 U/L    Protein, total 7.6 6.4 - 8.2 g/dL    Albumin 2.9 (L) 3.5 - 5.0 g/dL    Globulin 4.7 (H) 2.0 - 4.0 g/dL    A-G Ratio 0.6 (L) 1.1 - 2.2     CBC WITH AUTOMATED DIFF    Collection Time: 05/09/22 10:46 AM   Result Value Ref Range    WBC 9.0 3.6 - 11.0 K/uL    RBC 4.30 3.80 - 5.20 M/uL    HGB 10.8 (L) 11.5 - 16.0 g/dL    HCT 34.8 (L) 35.0 - 47.0 %    MCV 80.9 80.0 - 99.0 FL    MCH 25.1 (L) 26.0 - 34.0 PG    MCHC 31.0 30.0 - 36.5 g/dL    RDW 17.6 (H) 11.5 - 14.5 %    PLATELET 477 393 - 004 K/uL    MPV 11.2 8.9 - 12.9 FL    NRBC 0.0 0  WBC    ABSOLUTE NRBC 0.00 0.00 - 0.01 K/uL    NEUTROPHILS 83 (H) 32 - 75 %    LYMPHOCYTES 7 (L) 12 - 49 %    MONOCYTES 10 5 - 13 %    EOSINOPHILS 0 0 - 7 %    BASOPHILS 0 0 - 1 %    IMMATURE GRANULOCYTES 0 0.0 - 0.5 %    ABS. NEUTROPHILS 7.5 1.8 - 8.0 K/UL    ABS. LYMPHOCYTES 0.6 (L) 0.8 - 3.5 K/UL    ABS. MONOCYTES 0.9 0.0 - 1.0 K/UL    ABS. EOSINOPHILS 0.0 0.0 - 0.4 K/UL    ABS. BASOPHILS 0.0 0.0 - 0.1 K/UL    ABS. IMM.  GRANS. 0.0 0.00 - 0.04 K/UL    DF SMEAR SCANNED      RBC COMMENTS ANISOCYTOSIS  1+        RBC COMMENTS POLYCHROMASIA  PRESENT        RBC COMMENTS MALIA CELLS  PRESENT        RBC COMMENTS HYPOCHROMIA  PRESENT       TROPONIN-HIGH SENSITIVITY    Collection Time: 05/09/22 10:46 AM   Result Value Ref Range    Troponin-High Sensitivity 6 0 - 51 ng/L   HCG QL SERUM    Collection Time: 05/09/22 10:46 AM   Result Value Ref Range    HCG, Ql. Negative NEG     EKG, 12 LEAD, INITIAL    Collection Time: 05/09/22 10:50 AM   Result Value Ref Range    Ventricular Rate 114 BPM    Atrial Rate 114 BPM    P-R Interval 146 ms    QRS Duration 70 ms    Q-T Interval 328 ms    QTC Calculation (Bezet) 452 ms    Calculated P Axis 28 degrees    Calculated R Axis 44 degrees    Calculated T Axis 12 degrees    Diagnosis       Sinus tachycardia  Nonspecific T wave abnormality  No previous ECGs available     BLOOD GAS,CHEM8,LACTIC ACID POC    Collection Time: 05/09/22 10:59 AM   Result Value Ref Range    Calcium, ionized (POC) 1.15 1.12 - 1.32 mmol/L    BICARBONATE 19 mmol/L    Base deficit (POC) 4.1 mmol/L    Sample source VENOUS BLOOD      CO2, POC 19 19 - 24 MMOL/L    Sodium,  136 - 145 MMOL/L    Potassium, POC 3.3 (L) 3.5 - 5.5 MMOL/L    Chloride,  100 - 108 MMOL/L    Glucose,  74 - 106 MG/DL    Creatinine, POC 0.5 (L) 0.6 - 1.3 MG/DL    Lactic Acid (POC) 0.87 0.40 - 2.00 mmol/L    pH, venous (POC) 7.44 (H) 7.32 - 7.42      pCO2, venous (POC) 27.5 (L) 41 - 51 MMHG    pO2, venous (POC) 45 (H) 25 - 40 mmHg

## 2022-05-09 NOTE — PROGRESS NOTES
Transition of Care Plan:    RUR: 7%, LOW RISK   Disposition: Home with Aunt( Primary Caregiver)  Follow up appointments: PCP  DME needed: None identified at this time; Pt has a hospital bed, edil lift, wheelchair, Tube feedings and supplies   Transportation at Discharge: [address] or means to access home:  Pt Aunt has access      IM Medicare Letter: N/A Medicaid insurance   Is patient a BCPI-A Bundle:  Bundle letter will be distributed by unit CM if pt meets bundle criteria. If yes, was Bundle Letter given?:    Is patient a [place] and connected with the [place]? N/A        If yes, was [facility] transfer form completed and [facility] notified? Caregiver Contact: Aunt/Caregiver Dorian Carmona [phone]  Discharge Caregiver contacted prior to discharge? Unit CM to follow up before discharge  Care Conference needed?:                     Reason for Admission:  Sepsis                      RUR Score:         7%            Plan for utilizing home health:   Not identified at this time        PCP: First and Last name:  Eliza Nguyen MD     Name of Practice: [facility]     Are you a current patient: Yes/No:  Yes   Approximate date of last visit: 2022   Can you participate in a virtual visit with your PCP:  Yes                    Current Advanced Directive/Advance Care Plan: Full Code  Advance Care Planning   Healthcare Decision Maker:   Patient Aunt Abby Chen [phone]    Click here to complete [address] including selection of the Healthcare Decision Maker Relationship (ie \"Primary\")  Today we documented Decision Maker(s) consistent with Legal Next of Kin hierarchy. Transition of Care Plan:                    CM met with patient and her Aunt Dorian Carmona at the bedside to discuss discharge planning. Patient name, , and demographics all verified in chart. Patient has cerebral palsy, is nonverbal and wheelchair bound at baseline.  Patient's aunt Teja Leach is her consumer directed caregiver through [place]. Patient has a G-tube in which she receives tube feedings. Patient is a total assist. Patient preferred pharmacy is [address]. Patient is active with her PCP. Per patient's aunt she utilizes [facility] [employer] [facility] for transportation needs for patient. Unit CM to continue to follow for discharge needs and planning. Care Management Interventions  PCP Verified by CM:  Yes  Mode of Transport at Discharge: BLS  Transition of Care Consult (CM Consult): Discharge Planning  Discharge Durable Medical Equipment: No  Physical Therapy Consult: No  Occupational Therapy Consult: No  Speech Therapy Consult: No  Support Systems: Other Family Member(s),Caregiver/Home Care Staff  Confirm Follow Up Transport: Wheelchair [employer]  Discharge Location  Patient Expects to be Discharged to[de-identified] Home with family assistance    DESTINI Benedict, RN, BSW   RN Care Manager

## 2022-05-10 LAB
ANION GAP SERPL CALC-SCNC: 6 MMOL/L (ref 5–15)
ATRIAL RATE: 114 BPM
BACTERIA SPEC CULT: NORMAL
BUN SERPL-MCNC: 9 MG/DL (ref 6–20)
BUN/CREAT SERPL: 19 (ref 12–20)
CALCIUM SERPL-MCNC: 7.9 MG/DL (ref 8.5–10.1)
CALCULATED P AXIS, ECG09: 28 DEGREES
CALCULATED R AXIS, ECG10: 44 DEGREES
CALCULATED T AXIS, ECG11: 12 DEGREES
CC UR VC: NORMAL
CHLORIDE SERPL-SCNC: 112 MMOL/L (ref 97–108)
CO2 SERPL-SCNC: 22 MMOL/L (ref 21–32)
CREAT SERPL-MCNC: 0.48 MG/DL (ref 0.55–1.02)
DIAGNOSIS, 93000: NORMAL
ERYTHROCYTE [DISTWIDTH] IN BLOOD BY AUTOMATED COUNT: 17.3 % (ref 11.5–14.5)
GLUCOSE SERPL-MCNC: 81 MG/DL (ref 65–100)
HCT VFR BLD AUTO: 29.1 % (ref 35–47)
HGB BLD-MCNC: 9.2 G/DL (ref 11.5–16)
MCH RBC QN AUTO: 25.5 PG (ref 26–34)
MCHC RBC AUTO-ENTMCNC: 31.6 G/DL (ref 30–36.5)
MCV RBC AUTO: 80.6 FL (ref 80–99)
NRBC # BLD: 0 K/UL (ref 0–0.01)
NRBC BLD-RTO: 0 PER 100 WBC
P-R INTERVAL, ECG05: 146 MS
PLATELET # BLD AUTO: 254 K/UL (ref 150–400)
PMV BLD AUTO: 11.7 FL (ref 8.9–12.9)
POTASSIUM SERPL-SCNC: 3.1 MMOL/L (ref 3.5–5.1)
Q-T INTERVAL, ECG07: 328 MS
QRS DURATION, ECG06: 70 MS
QTC CALCULATION (BEZET), ECG08: 452 MS
RBC # BLD AUTO: 3.61 M/UL (ref 3.8–5.2)
SERVICE CMNT-IMP: NORMAL
SODIUM SERPL-SCNC: 140 MMOL/L (ref 136–145)
VENTRICULAR RATE, ECG03: 114 BPM
WBC # BLD AUTO: 5.2 K/UL (ref 3.6–11)

## 2022-05-10 PROCEDURE — 94640 AIRWAY INHALATION TREATMENT: CPT

## 2022-05-10 PROCEDURE — 74011250636 HC RX REV CODE- 250/636: Performed by: STUDENT IN AN ORGANIZED HEALTH CARE EDUCATION/TRAINING PROGRAM

## 2022-05-10 PROCEDURE — 74011000250 HC RX REV CODE- 250: Performed by: STUDENT IN AN ORGANIZED HEALTH CARE EDUCATION/TRAINING PROGRAM

## 2022-05-10 PROCEDURE — 85027 COMPLETE CBC AUTOMATED: CPT

## 2022-05-10 PROCEDURE — 74011000250 HC RX REV CODE- 250: Performed by: INTERNAL MEDICINE

## 2022-05-10 PROCEDURE — 74011250637 HC RX REV CODE- 250/637: Performed by: STUDENT IN AN ORGANIZED HEALTH CARE EDUCATION/TRAINING PROGRAM

## 2022-05-10 PROCEDURE — 74011000258 HC RX REV CODE- 258: Performed by: INTERNAL MEDICINE

## 2022-05-10 PROCEDURE — 65270000046 HC RM TELEMETRY

## 2022-05-10 PROCEDURE — 74011250636 HC RX REV CODE- 250/636: Performed by: INTERNAL MEDICINE

## 2022-05-10 PROCEDURE — 80048 BASIC METABOLIC PNL TOTAL CA: CPT

## 2022-05-10 PROCEDURE — 94760 N-INVAS EAR/PLS OXIMETRY 1: CPT

## 2022-05-10 PROCEDURE — 74011250637 HC RX REV CODE- 250/637: Performed by: INTERNAL MEDICINE

## 2022-05-10 PROCEDURE — 94761 N-INVAS EAR/PLS OXIMETRY MLT: CPT

## 2022-05-10 PROCEDURE — 36415 COLL VENOUS BLD VENIPUNCTURE: CPT

## 2022-05-10 RX ORDER — SODIUM CHLORIDE 9 MG/ML
75 INJECTION, SOLUTION INTRAVENOUS CONTINUOUS
Status: DISCONTINUED | OUTPATIENT
Start: 2022-05-10 | End: 2022-05-11

## 2022-05-10 RX ORDER — IPRATROPIUM BROMIDE AND ALBUTEROL SULFATE 2.5; .5 MG/3ML; MG/3ML
3 SOLUTION RESPIRATORY (INHALATION)
Status: DISCONTINUED | OUTPATIENT
Start: 2022-05-10 | End: 2022-05-11

## 2022-05-10 RX ADMIN — DICYCLOMINE HYDROCHLORIDE 20 MG: 20 TABLET ORAL at 11:34

## 2022-05-10 RX ADMIN — SODIUM CHLORIDE 75 ML/HR: 9 INJECTION, SOLUTION INTRAVENOUS at 11:34

## 2022-05-10 RX ADMIN — ENOXAPARIN SODIUM 40 MG: 100 INJECTION SUBCUTANEOUS at 09:16

## 2022-05-10 RX ADMIN — AZITHROMYCIN MONOHYDRATE 500 MG: 500 INJECTION, POWDER, LYOPHILIZED, FOR SOLUTION INTRAVENOUS at 09:16

## 2022-05-10 RX ADMIN — SODIUM CHLORIDE, PRESERVATIVE FREE 10 ML: 5 INJECTION INTRAVENOUS at 23:01

## 2022-05-10 RX ADMIN — CLONAZEPAM 1 MG: 1 TABLET ORAL at 18:07

## 2022-05-10 RX ADMIN — IPRATROPIUM BROMIDE AND ALBUTEROL SULFATE 3 ML: .5; 3 SOLUTION RESPIRATORY (INHALATION) at 19:54

## 2022-05-10 RX ADMIN — SODIUM CHLORIDE, PRESERVATIVE FREE 10 ML: 5 INJECTION INTRAVENOUS at 14:45

## 2022-05-10 RX ADMIN — DICYCLOMINE HYDROCHLORIDE 20 MG: 20 TABLET ORAL at 06:00

## 2022-05-10 RX ADMIN — METRONIDAZOLE 500 MG: 500 INJECTION, SOLUTION INTRAVENOUS at 09:17

## 2022-05-10 RX ADMIN — SODIUM CHLORIDE, PRESERVATIVE FREE 10 ML: 5 INJECTION INTRAVENOUS at 11:35

## 2022-05-10 RX ADMIN — DICYCLOMINE HYDROCHLORIDE 20 MG: 20 TABLET ORAL at 01:17

## 2022-05-10 RX ADMIN — CLONAZEPAM 1 MG: 1 TABLET ORAL at 09:17

## 2022-05-10 RX ADMIN — IPRATROPIUM BROMIDE AND ALBUTEROL SULFATE 3 ML: .5; 3 SOLUTION RESPIRATORY (INHALATION) at 11:35

## 2022-05-10 RX ADMIN — CLONAZEPAM 1 MG: 1 TABLET ORAL at 23:00

## 2022-05-10 RX ADMIN — METRONIDAZOLE 500 MG: 500 INJECTION, SOLUTION INTRAVENOUS at 23:00

## 2022-05-10 RX ADMIN — TRAZODONE HYDROCHLORIDE 50 MG: 50 TABLET ORAL at 23:00

## 2022-05-10 RX ADMIN — CEFTRIAXONE 1 G: 1 INJECTION, POWDER, FOR SOLUTION INTRAMUSCULAR; INTRAVENOUS at 09:16

## 2022-05-10 RX ADMIN — DICYCLOMINE HYDROCHLORIDE 20 MG: 20 TABLET ORAL at 18:07

## 2022-05-10 RX ADMIN — IPRATROPIUM BROMIDE AND ALBUTEROL SULFATE 3 ML: .5; 3 SOLUTION RESPIRATORY (INHALATION) at 15:54

## 2022-05-10 RX ADMIN — CLONAZEPAM 1 MG: 1 TABLET ORAL at 12:20

## 2022-05-10 RX ADMIN — POTASSIUM BICARBONATE 40 MEQ: 782 TABLET, EFFERVESCENT ORAL at 11:34

## 2022-05-10 NOTE — PROGRESS NOTES
Hospitalist Progress Note    NAME: Rosa Isela Nichols   :  1985   MRN:  [id number]       Assessment / Plan:    Sepsis (Tm 103.4, tachycardia , RR35)  CAP  Hx of aspiration pna  Diarrhea   UTI POA  Pt is not hypoxic  CXR showed low lung volumes with possible left lower lobe infiltrate  Rapid covid and influenza a/b neg  Respiratory panel negative. UA suggestive of UTI, culture pending. Blood culture negative so far. Decrease IV fluids. Continue IV rocephin/aztihromycin/flagyl  Patient has not had a bowel movement since last night if continue to have so then will discontinue the C. difficile. Nebs, tessalon perls prn  O2 suppl prn     Seizure disorder, not on seizure meds  CP, nonverbal at baseline  Dysphagia, has a G tube, will consult nutrition to help with TF  Pharmacy to help with med rec  Resume tube feedings today. Consulted dietitian    Updated the caregiver present at the bedside    25.0 - 29.9 Overweight / Body mass index is 25.84 kg/m². Estimated discharge date: May 12  Barriers: Clinical improvement    Code status: Full  Prophylaxis: Lovenox  Recommended Disposition: Home w/Family     Subjective:     Chief Complaint / Reason for Physician Visit  Patient seen today, several positive patient, not in distress. Patient is low-grade fever, discussed with the caregiver present at bedside. Discussed with RN events overnight. Review of Systems:  Symptom Y/N Comments  Symptom Y/N Comments   Fever/Chills    Chest Pain     Poor Appetite    Edema     Cough    Abdominal Pain     Sputum    Joint Pain     SOB/PRATT    Pruritis/Rash     Nausea/vomit    Tolerating PT/OT     Diarrhea    Tolerating Diet     Constipation    Other       Could NOT obtain due to:      Objective:     VITALS:   Last 24hrs VS reviewed since prior progress note.  Most recent are:  Patient Vitals for the past 24 hrs:   Temp Pulse Resp BP SpO2   05/10/22 1135 -- -- -- -- 98 %   05/10/22 0858 (!) 100.6 °F (38.1 °C) 98 18 96/70 --   05/09/22 1945 (!) 100.9 °F (38.3 °C) (!) 109 20 -- 96 %   05/09/22 1630 98.9 °F (37.2 °C) 97 (!) 32 103/73 99 %   05/09/22 1600 -- 95 (!) 34 100/65 99 %   05/09/22 1500 -- 97 (!) 32 102/69 98 %   05/09/22 1400 -- 98 (!) 33 93/68 97 %   05/09/22 1330 -- 99 (!) 32 98/61 97 %   05/09/22 1300 99.5 °F (37.5 °C) 98 (!) 31 98/71 96 %     No intake or output data in the 24 hours ending 05/10/22 1205     I had a face to face encounter and independently examined this patient on 5/10/2022, as outlined below:  PHYSICAL EXAM:  General: WD, WN. Alert, cooperative, no acute distress    EENT:  EOMI. Anicteric sclerae. MMM  Resp:  Decreased breath sounds bilaterally, rhonchi lower lobes  CV:  Regular  rhythm,  No edema  GI:  Soft, Non distended, Non tender. +Bowel sounds  Neurologic:  Alert and oriented X 3, normal speech,   Psych:   Good insight. Not anxious nor agitated  Skin:  No rashes. No jaundice    Reviewed most current lab test results and cultures  YES  Reviewed most current radiology test results   YES  Review and summation of old records today    NO  Reviewed patient's current orders and MAR    YES  PMH/SH reviewed - no change compared to H&P  ________________________________________________________________________  Care Plan discussed with:    Comments   Patient     Family  x  caregiver   RN x    Care Manager     Consultant                       x Multidiciplinary team rounds were held today with , nursing, pharmacist and clinical coordinator. Patient's plan of care was discussed; medications were reviewed and discharge planning was addressed.      ________________________________________________________________________  Total NON critical care TIME: 36   minutes    Total CRITICAL CARE TIME Spent:   Minutes non procedure based      Comments   >50% of visit spent in counseling and coordination of care     ________________________________________________________________________  Lexi Murphy MD Procedures: see electronic medical records for all procedures/Xrays and details which were not copied into this note but were reviewed prior to creation of Plan. LABS:  I reviewed today's most current labs and imaging studies.   Pertinent labs include:  Recent Labs     05/10/22  0330 05/09/22  1046   WBC 5.2 9.0   HGB 9.2* 10.8*   HCT 29.1* 34.8*    284     Recent Labs     05/10/22  0330 05/09/22  1046    134*   K 3.1* 3.7   * 105   CO2 22 22   GLU 81 116*   BUN 9 10   CREA 0.48* 0.61   CA 7.9* 8.7   ALB  --  2.9*   TBILI  --  1.0   ALT  --  19       Signed: James Rivera MD

## 2022-05-10 NOTE — PROGRESS NOTES
Comprehensive Nutrition Assessment    Type and Reason for Visit: Initial,Consult    Nutrition Recommendations/Plan:   1. Continue Jevity 1.5 loyda TFs starting at 10 ml/hr x 24 hr continuous infusion with advancement as tolerated by 10 ml/hr x q6h to goal rate 40 ml/hr x 24 hr.  2. Continue to flush with 150 ml free water q4h. 3. Jevity 1.5 loyda at 40 ml/hr x 24 hr + free water flushes will provide daily approx. 1440 kcals (~19.2 kcal/kg bw)/61 g protein/207 g CHO/1630 ml free water. 4. RD placed order to obtain bed scale weight during admission for optimal TF assessment and recs. 5. RD explained rationale to Carrie Lynch () for continuous TFs in effort to maintain a closed TF system to decrease risk of contamination; in agreement. Nutrition Assessment:     5/10: Chart reviewed; med noted for sepsis on admission, hx of aspiration pneumonia, dysphagia with G-tube. RD visited with pt and Jacqueline/jayt (whom cares for the pt at home) who shared that pt typically receives Jevity 1.2 loyda at home via gravity up to 6 cans/bottles (1400 ml) daily which would provide daily approx. 1728 kcals. Carrie Lynch reports that she has been working with Deven Kim, outpatient RD to assist with TF management as concerned pt has gained too much weight. Carrie Lynch is hopeful to obtain a new bed scale weight during admission to ensure optimal nutrition assessment and TF adjustments. Per outpatient RD note, appears weight documented in Dec 2021 was 135 lbs, current stated weight 164 lbs. Pt does appear to be well-nourished. RD provided bottle of Jevity 1.5 loyda to the bedside; both RD and RN requested a TF pump from [employer] to be delivered at the bedside. Last Weight Metric  Weight Loss Metrics 5/9/2022 4/27/2016   Today's Wt 165 lb -   BMI 25.84 kg/m2 -     Nutrition Related Findings:    BM: 5/9; Labs: K+ 3.1;  Meds; IVF NaCl @ 75 ml/hr x 24 hr Wound Type: None    Current Nutrition Intake & Therapies:        ADULT TUBE FEEDING PEG; Standard with Fiber; Delivery Method: Continuous; Continuous Initial Rate (mL/hr): 10; Continuous Advance Tube Feeding: Yes; Advancement Volume (mL/hr): 10; Advancement Frequency: Q 6 hours; Continuous Goal Rate (mL/hr): 40. .. Anthropometric Measures:  Height: 5' 7\" (170.2 cm)  Ideal Body Weight (IBW): 135 lbs (61 kg)     Current Body Wt:  74.8 kg (164 lb 14.5 oz), 122.2 % IBW. Stated (stated but aunt (primary caregiver requests an updated bedscale wt))  Current BMI (kg/m2): 25.8                          BMI Category: Overweight (BMI 25.0-29. 9)    Estimated Daily Nutrient Needs:  Energy Requirements Based On: Formula  Weight Used for Energy Requirements: Current  Energy (kcal/day): 1536 (BMR 1480 x 1. 2AF) - 250 kcals  Weight Used for Protein Requirements: Current  Protein (g/day): 75 (1.0 g/kg bw)  Method Used for Fluid Requirements: 1 ml/kcal  Fluid (ml/day): 1500 ml/day    Nutrition Diagnosis:   · Inadequate protein-energy intake related to swallowing difficulty as evidenced by nutrition support-enteral nutrition      Nutrition Interventions:   Food and/or Nutrient Delivery: Continue tube feeding  Nutrition Education/Counseling: No recommendations at this time  Coordination of Nutrition Care: Continue to monitor while inpatient       Goals:     Goals:  Tolerate nutrition support at goal rate,by next RD assessment       Nutrition Monitoring and Evaluation:   Behavioral-Environmental Outcomes: None identified  Food/Nutrient Intake Outcomes: Enteral nutrition intake/tolerance  Physical Signs/Symptoms Outcomes: Biochemical data,Skin,Weight,GI status    Discharge Planning:    Enteral nutrition    Kwame Cruz RD  Contact: [id number]

## 2022-05-11 LAB
ANION GAP SERPL CALC-SCNC: 4 MMOL/L (ref 5–15)
BUN SERPL-MCNC: 8 MG/DL (ref 6–20)
BUN/CREAT SERPL: 17 (ref 12–20)
CALCIUM SERPL-MCNC: 8.4 MG/DL (ref 8.5–10.1)
CHLORIDE SERPL-SCNC: 111 MMOL/L (ref 97–108)
CO2 SERPL-SCNC: 24 MMOL/L (ref 21–32)
CREAT SERPL-MCNC: 0.46 MG/DL (ref 0.55–1.02)
GLUCOSE SERPL-MCNC: 93 MG/DL (ref 65–100)
POTASSIUM SERPL-SCNC: 3.9 MMOL/L (ref 3.5–5.1)
SODIUM SERPL-SCNC: 139 MMOL/L (ref 136–145)

## 2022-05-11 PROCEDURE — 74011000250 HC RX REV CODE- 250: Performed by: STUDENT IN AN ORGANIZED HEALTH CARE EDUCATION/TRAINING PROGRAM

## 2022-05-11 PROCEDURE — 36415 COLL VENOUS BLD VENIPUNCTURE: CPT

## 2022-05-11 PROCEDURE — 74011250636 HC RX REV CODE- 250/636: Performed by: INTERNAL MEDICINE

## 2022-05-11 PROCEDURE — 80048 BASIC METABOLIC PNL TOTAL CA: CPT

## 2022-05-11 PROCEDURE — 74011250637 HC RX REV CODE- 250/637: Performed by: INTERNAL MEDICINE

## 2022-05-11 PROCEDURE — 65270000046 HC RM TELEMETRY

## 2022-05-11 PROCEDURE — 74011000258 HC RX REV CODE- 258: Performed by: INTERNAL MEDICINE

## 2022-05-11 PROCEDURE — 74011000250 HC RX REV CODE- 250: Performed by: INTERNAL MEDICINE

## 2022-05-11 PROCEDURE — 94640 AIRWAY INHALATION TREATMENT: CPT

## 2022-05-11 PROCEDURE — 94761 N-INVAS EAR/PLS OXIMETRY MLT: CPT

## 2022-05-11 PROCEDURE — 94660 CPAP INITIATION&MGMT: CPT

## 2022-05-11 RX ORDER — IPRATROPIUM BROMIDE AND ALBUTEROL SULFATE 2.5; .5 MG/3ML; MG/3ML
3 SOLUTION RESPIRATORY (INHALATION)
Status: DISCONTINUED | OUTPATIENT
Start: 2022-05-11 | End: 2022-05-15

## 2022-05-11 RX ADMIN — CLONAZEPAM 1 MG: 1 TABLET ORAL at 21:44

## 2022-05-11 RX ADMIN — METRONIDAZOLE 500 MG: 500 INJECTION, SOLUTION INTRAVENOUS at 09:56

## 2022-05-11 RX ADMIN — IPRATROPIUM BROMIDE AND ALBUTEROL SULFATE 3 ML: .5; 3 SOLUTION RESPIRATORY (INHALATION) at 21:18

## 2022-05-11 RX ADMIN — SODIUM CHLORIDE, PRESERVATIVE FREE 10 ML: 5 INJECTION INTRAVENOUS at 06:00

## 2022-05-11 RX ADMIN — SODIUM CHLORIDE, PRESERVATIVE FREE 10 ML: 5 INJECTION INTRAVENOUS at 17:25

## 2022-05-11 RX ADMIN — AZITHROMYCIN MONOHYDRATE 500 MG: 500 INJECTION, POWDER, LYOPHILIZED, FOR SOLUTION INTRAVENOUS at 10:06

## 2022-05-11 RX ADMIN — CEFTRIAXONE 1 G: 1 INJECTION, POWDER, FOR SOLUTION INTRAMUSCULAR; INTRAVENOUS at 09:55

## 2022-05-11 RX ADMIN — SODIUM CHLORIDE, PRESERVATIVE FREE 10 ML: 5 INJECTION INTRAVENOUS at 21:45

## 2022-05-11 RX ADMIN — DICYCLOMINE HYDROCHLORIDE 20 MG: 20 TABLET ORAL at 09:57

## 2022-05-11 RX ADMIN — METRONIDAZOLE 500 MG: 500 INJECTION, SOLUTION INTRAVENOUS at 21:44

## 2022-05-11 RX ADMIN — DICYCLOMINE HYDROCHLORIDE 20 MG: 20 TABLET ORAL at 12:27

## 2022-05-11 RX ADMIN — DICYCLOMINE HYDROCHLORIDE 20 MG: 20 TABLET ORAL at 01:54

## 2022-05-11 RX ADMIN — CLONAZEPAM 1 MG: 1 TABLET ORAL at 09:57

## 2022-05-11 RX ADMIN — IPRATROPIUM BROMIDE AND ALBUTEROL SULFATE 3 ML: .5; 3 SOLUTION RESPIRATORY (INHALATION) at 08:40

## 2022-05-11 RX ADMIN — DICYCLOMINE HYDROCHLORIDE 20 MG: 20 TABLET ORAL at 17:25

## 2022-05-11 RX ADMIN — CLONAZEPAM 1 MG: 1 TABLET ORAL at 12:27

## 2022-05-11 RX ADMIN — CLONAZEPAM 1 MG: 1 TABLET ORAL at 17:24

## 2022-05-11 RX ADMIN — TRAZODONE HYDROCHLORIDE 50 MG: 50 TABLET ORAL at 21:44

## 2022-05-11 NOTE — PROGRESS NOTES
ADULT PROTOCOL: JET AEROSOL ASSESSMENT    Patient  Tamara Pelaez     31 y.o.   female     5/11/2022  8:50 AM    Breath Sounds Pre Procedure: Right Breath Sounds: Coarse                               Left Breath Sounds: Coarse    Breath Sounds Post Procedure: Right Breath Sounds: Coarse                                 Left Breath Sounds: Coarse    Breathing pattern: Pre procedure Breathing Pattern: Regular          Post procedure Breathing Pattern: Regular    Heart Rate: Pre procedure Pulse: 91           Post procedure Pulse: 96    Resp Rate: Pre procedure Respirations: 18           Post procedure Respirations: 19    Cough: Pre procedure Cough: Non-productive               Post procedure      Oxygen: O2 Device: None (Room air)         Changed: NO    SpO2: Pre procedure SpO2: 94 %   without oxygen              Post procedure SpO2: 92 %  without oxygen    Nebulizer Therapy: Current medications Aerosolized Medications: DuoNeb      Changed: YES    Smoking History:   Social History     Tobacco Use   Smoking Status Never Smoker   Smokeless Tobacco Never Used       Problem List:   Patient Active Problem List   Diagnosis Code    Pneumonia J18.9       Respiratory Therapist: Nena Dee RT

## 2022-05-11 NOTE — PROGRESS NOTES
Transition of Care Plan:     RUR: 13% - low   Disposition: Home with Aunt( Primary Caregiver)  Follow up appointments: PCP  DME needed: None identified at this time; Pt has a hospital bed, edil lift, wheelchair, Tube feedings and supplies   Transportation at Discharge: [facility] to be arranged   Keys or means to access home:  Pt Aunt has access      IM Medicare Letter: N/A Medicaid insurance   Is patient a BCPI-A Bundle:  Bundle letter will be distributed by unit CM if pt meets bundle criteria. If yes, was Bundle Letter given?:    Is patient a  and connected with the [employer] ? N/A        If yes, was [place] transfer form completed and [facility] notified? Caregiver Contact: Aunt/Caregiver Kojo Potter [phone]  Discharge Caregiver contacted prior to discharge? Unit CM to follow up before discharge  Care Conference needed?: No    Initial Note: Chart reviewed. Discharge plan discussed during IDR. Pt not medically stable for d/c at this time. ELOISE is 5/12. CM will continue to follow for dcp.     SARAH Han  Care Manager, [facility]

## 2022-05-11 NOTE — PROGRESS NOTES
Hospitalist Progress Note    NAME: Grabiel Galloway   :  1985   MRN:  [id number]       Assessment / Plan:    Sepsis (Tm 103.4, tachycardia , RR35)  CAP  Hx of aspiration pna  Diarrhea   UTI POA  CXR showed low lung volumes with possible left lower lobe infiltrate  Rapid covid and influenza a/b neg  Respiratory panel negative. UA suggestive of UTI, culture shows mixed olman. Blood culture negative so far. Decrease IV fluids. Continue IV rocephin/aztihromycin/flagyl  Discontinue C. difficile and enteric bacterial panel as the patient is not having any diarrhea since admission. Nebs, tessalon perls prn  On room air. Afebrile since last morning.     Seizure disorder, not on seizure meds  CP, nonverbal at baseline  Dysphagia, has a G tube, will consult nutrition to help with TF  Pharmacy to help with med rec  Resume tube feedings today. Consulted dietitian    Updated the caregiver present at the bedside    25.0 - 29.9 Overweight / Body mass index is 25.84 kg/m². Estimated discharge date: May 12  Barriers: Clinical improvement    Code status: Full  Prophylaxis: Lovenox  Recommended Disposition: Home w/Family     Subjective:     Chief Complaint / Reason for Physician Visit  Patient seen today, not in distress, afebrile. Discussed with the care giver who is present at the bedside. Discussed with RN events overnight. Objective:     VITALS:   Last 24hrs VS reviewed since prior progress note.  Most recent are:  Patient Vitals for the past 24 hrs:   Temp Pulse Resp BP SpO2   22 1128 97.5 °F (36.4 °C) 89 21 111/72 95 %   22 0843 -- -- -- -- 94 %   22 0818 97.7 °F (36.5 °C) 86 21 121/70 --   22 0400 98.7 °F (37.1 °C) 89 20 124/86 --   22 0001 98.9 °F (37.2 °C) 91 20 109/80 91 %   05/10/22 1955 98.6 °F (37 °C) (!) 103 18 124/70 92 %   05/10/22 1554 -- -- -- -- 94 %   05/10/22 1545 97.3 °F (36.3 °C) (!) 102 19 100/74 98 %       Intake/Output Summary (Last 24 hours) at 5/11/2022 1239  Last data filed at 5/11/2022 0810  Gross per 24 hour   Intake 300 ml   Output --   Net 300 ml        I had a face to face encounter and independently examined this patient on 5/11/2022, as outlined below:  PHYSICAL EXAM:  General: WD, WN. Awakw, cooperative, no acute distress    EENT:  EOMI. Anicteric sclerae. MMM  Resp:  Decreased breath sounds bilaterally, rhonchi lower lobes  CV:  Regular  rhythm,  No edema  GI:  Soft, Non distended, Non tender. +Bowel sounds  Neurologic:  Cerebral palsy patient, awake but not alert, normal speech,   Psych:   Good insight. Not anxious nor agitated  Skin:  No rashes. No jaundice    Reviewed most current lab test results and cultures  YES  Reviewed most current radiology test results   YES  Review and summation of old records today    NO  Reviewed patient's current orders and MAR    YES  PMH/ reviewed - no change compared to H&P  ________________________________________________________________________  Care Plan discussed with:    Comments   Patient     Family  x  caregiver   RN x    Care Manager     Consultant                       x Multidiciplinary team rounds were held today with , nursing, pharmacist and clinical coordinator. Patient's plan of care was discussed; medications were reviewed and discharge planning was addressed. ________________________________________________________________________  Total NON critical care TIME: 36   minutes    Total CRITICAL CARE TIME Spent:   Minutes non procedure based      Comments   >50% of visit spent in counseling and coordination of care     ________________________________________________________________________  Kurtis Shepard MD     Procedures: see electronic medical records for all procedures/Xrays and details which were not copied into this note but were reviewed prior to creation of Plan. LABS:  I reviewed today's most current labs and imaging studies.   Pertinent labs include:  Recent Labs     05/10/22  0330 05/09/22  1046   WBC 5.2 9.0   HGB 9.2* 10.8*   HCT 29.1* 34.8*    284     Recent Labs     05/11/22  0344 05/10/22  0330 05/09/22  1046    140 134*   K 3.9 3.1* 3.7   * 112* 105   CO2 24 22 22   GLU 93 81 116*   BUN 8 9 10   CREA 0.46* 0.48* 0.61   CA 8.4* 7.9* 8.7   ALB  --   --  2.9*   TBILI  --   --  1.0   ALT  --   --  19       Signed: Elaine Tian MD

## 2022-05-12 LAB
COMMENT, HOLDF: NORMAL
SAMPLES BEING HELD,HOLD: NORMAL

## 2022-05-12 PROCEDURE — 74011250637 HC RX REV CODE- 250/637: Performed by: INTERNAL MEDICINE

## 2022-05-12 PROCEDURE — 94761 N-INVAS EAR/PLS OXIMETRY MLT: CPT

## 2022-05-12 PROCEDURE — 74011000258 HC RX REV CODE- 258: Performed by: INTERNAL MEDICINE

## 2022-05-12 PROCEDURE — 94640 AIRWAY INHALATION TREATMENT: CPT

## 2022-05-12 PROCEDURE — 74011000250 HC RX REV CODE- 250: Performed by: INTERNAL MEDICINE

## 2022-05-12 PROCEDURE — 36415 COLL VENOUS BLD VENIPUNCTURE: CPT

## 2022-05-12 PROCEDURE — 74011250636 HC RX REV CODE- 250/636: Performed by: INTERNAL MEDICINE

## 2022-05-12 PROCEDURE — 65270000046 HC RM TELEMETRY

## 2022-05-12 PROCEDURE — 74011000250 HC RX REV CODE- 250: Performed by: STUDENT IN AN ORGANIZED HEALTH CARE EDUCATION/TRAINING PROGRAM

## 2022-05-12 RX ADMIN — AZITHROMYCIN MONOHYDRATE 500 MG: 500 INJECTION, POWDER, LYOPHILIZED, FOR SOLUTION INTRAVENOUS at 09:41

## 2022-05-12 RX ADMIN — CLONAZEPAM 1 MG: 1 TABLET ORAL at 21:04

## 2022-05-12 RX ADMIN — SODIUM CHLORIDE, PRESERVATIVE FREE 10 ML: 5 INJECTION INTRAVENOUS at 23:20

## 2022-05-12 RX ADMIN — IPRATROPIUM BROMIDE AND ALBUTEROL SULFATE 3 ML: .5; 3 SOLUTION RESPIRATORY (INHALATION) at 20:30

## 2022-05-12 RX ADMIN — DICYCLOMINE HYDROCHLORIDE 20 MG: 20 TABLET ORAL at 12:40

## 2022-05-12 RX ADMIN — SODIUM CHLORIDE, PRESERVATIVE FREE 10 ML: 5 INJECTION INTRAVENOUS at 06:44

## 2022-05-12 RX ADMIN — DICYCLOMINE HYDROCHLORIDE 20 MG: 20 TABLET ORAL at 02:26

## 2022-05-12 RX ADMIN — CEFTRIAXONE 1 G: 1 INJECTION, POWDER, FOR SOLUTION INTRAMUSCULAR; INTRAVENOUS at 09:43

## 2022-05-12 RX ADMIN — METRONIDAZOLE 500 MG: 500 INJECTION, SOLUTION INTRAVENOUS at 09:42

## 2022-05-12 RX ADMIN — DICYCLOMINE HYDROCHLORIDE 20 MG: 20 TABLET ORAL at 17:26

## 2022-05-12 RX ADMIN — DICYCLOMINE HYDROCHLORIDE 20 MG: 20 TABLET ORAL at 06:43

## 2022-05-12 RX ADMIN — TRAZODONE HYDROCHLORIDE 50 MG: 50 TABLET ORAL at 21:04

## 2022-05-12 RX ADMIN — CLONAZEPAM 1 MG: 1 TABLET ORAL at 12:40

## 2022-05-12 RX ADMIN — CLONAZEPAM 1 MG: 1 TABLET ORAL at 09:43

## 2022-05-12 RX ADMIN — DICYCLOMINE HYDROCHLORIDE 20 MG: 20 TABLET ORAL at 23:20

## 2022-05-12 RX ADMIN — IPRATROPIUM BROMIDE AND ALBUTEROL SULFATE 3 ML: .5; 3 SOLUTION RESPIRATORY (INHALATION) at 09:29

## 2022-05-12 RX ADMIN — CLONAZEPAM 1 MG: 1 TABLET ORAL at 17:26

## 2022-05-12 RX ADMIN — METRONIDAZOLE 500 MG: 500 INJECTION, SOLUTION INTRAVENOUS at 21:04

## 2022-05-12 NOTE — PROGRESS NOTES
Hospitalist Progress Note    NAME: Og Matson   :  1985   MRN:  [id number]       Assessment / Plan:    Sepsis (Tm 103.4, tachycardia , RR35)  CAP  Hx of aspiration pna  Diarrhea   UTI POA  CXR showed low lung volumes with possible left lower lobe infiltrate  Rapid covid and influenza a/b neg  Respiratory panel negative. UA suggestive of UTI, culture shows mixed olman. Blood culture negative so far. Decrease IV fluids. Continue IV rocephin/aztihromycin/flagyl  Discontinue C. difficile and enteric bacterial panel as the patient is not having any diarrhea since admission. Nebs, tessalon perls prn  On room air. GI consulted for the PEG tube placement. .  Patient is more stable now afebrile, awaiting further input from the GI doctors. Once we get their input then we can discharge the patient home.     Seizure disorder, not on seizure meds  CP, nonverbal at baseline  Dysphagia, has a G tube, will consult nutrition to help with TF  Pharmacy to help with med rec  Resume tube feedings today. Consulted dietitian    Updated the caregiver present at the bedside    25.0 - 29.9 Overweight / Body mass index is 25.84 kg/m². Estimated discharge date: May 12  Barriers: Clinical improvement, GI recommendations for PEG tube placement    Code status: Full  Prophylaxis: Lovenox  Recommended Disposition: Home w/Family     Subjective:     Chief Complaint / Reason for Physician Visit  Patient seen and examined today, doing fine, updated the caregiver present at bedside. Discussed with RN events overnight. Objective:     VITALS:   Last 24hrs VS reviewed since prior progress note.  Most recent are:  Patient Vitals for the past 24 hrs:   Temp Pulse Resp BP SpO2   22 0930 98.9 °F (37.2 °C) 70 18 109/84 99 %   22 2255 -- -- -- -- 99 %   22 2118 -- -- -- -- 96 %   22 1945 99.2 °F (37.3 °C) 85 20 123/80 95 %   22 1614 98.7 °F (37.1 °C) 91 20 -- 97 %     No intake or output data in the 24 hours ending 05/12/22 1235     I had a face to face encounter and independently examined this patient on 5/12/2022, as outlined below:  PHYSICAL EXAM:  General: WD, WN. Awakw, cooperative, no acute distress    EENT:  EOMI. Anicteric sclerae. MMM  Resp:  Decreased breath sounds bilaterally, rhonchi lower lobes  CV:  Regular  rhythm,  No edema  GI:  Soft, Non distended, Non tender. +Bowel sounds  Neurologic:  Cerebral palsy patient, awake but not alert, normal speech,   Psych:   Good insight. Not anxious nor agitated  Skin:  No rashes. No jaundice    Reviewed most current lab test results and cultures  YES  Reviewed most current radiology test results   YES  Review and summation of old records today    NO  Reviewed patient's current orders and MAR    YES  PMH/SH reviewed - no change compared to H&P  ________________________________________________________________________  Care Plan discussed with:    Comments   Patient     Family  x  caregiver   RN x    Care Manager     Consultant                       x Multidiciplinary team rounds were held today with , nursing, pharmacist and clinical coordinator. Patient's plan of care was discussed; medications were reviewed and discharge planning was addressed. ________________________________________________________________________  Total NON critical care TIME: 36   minutes    Total CRITICAL CARE TIME Spent:   Minutes non procedure based      Comments   >50% of visit spent in counseling and coordination of care     ________________________________________________________________________  Anne Ceja MD     Procedures: see electronic medical records for all procedures/Xrays and details which were not copied into this note but were reviewed prior to creation of Plan. LABS:  I reviewed today's most current labs and imaging studies.   Pertinent labs include:  Recent Labs     05/10/22  0330   WBC 5.2   HGB 9.2*   HCT 29.1*        Recent Labs     05/11/22  0344 05/10/22  0330    140   K 3.9 3.1*   * 112*   CO2 24 22   GLU 93 81   BUN 8 9   CREA 0.46* 0.48*   CA 8.4* 7.9*       Signed: Tamara Villegas MD

## 2022-05-12 NOTE — CONSULTS
GI CONSULTATION NOTE  Jaydon Mac, ANGEL  [phone] NP in-hospital cell phone M-F until 4:30  After 5pm or on weekends, please call  for physician on call    NAME: Moses Moreno   :  1985   MRN:  [id number]   Attending: Dr. Nina Castaneda  Primary GI: Dr. Michelle Robbins  Date/Time:  2022 11:55 AM  Assessment:   -Dysphagia, PEG in place  · Has been using bueno catheter long term with good results  · Tube feeds easily administered, flushes easily, no complications    -Cerebral Palsy, nonverbal      Plan:   -No plans for endoscopic intervention. PEG is working well with no issues. Ok to use bueno as is reliably utilized for many years by patient's caregiver. She has easy access to replacements, has a system in place for replacing weekly. Would not intervene at this time since its working.  -Continue tube feeds per nutritionist/dietician recommendations  -Reviewed with patient's caregiver if continuous to have serous drainage from granulation tissue around peg site, could have treatment with silver nitrate, she can follow up with [facility] as needed for that  -Will sign off for now. Please call GI with any further questions or concerns. Thank you! Plan discussed with Dr. Nara Perez  Subjective:     HISTORY OF PRESENT ILLNESS:     Moses Moreno is an 31 y.o.  female who we are asked to see for complaint of PEG tube replacement. Patient has cerebral palsy, dysphagia, and is nonverbal. Information is gleamed from the patient's caregiver, her mother. She reports that she followed up with GI in 2021, was recommended to go to IR for PEG placement. At that time had been using a bueno catheter for tube feeds. Reports went to IR and saw Dr. Lili Branham and had replacement of the PEG. However she reports 2 months later it fell out because something was wrong with the balloon. Since then she has been using bueno catheters. No issues with providing tube feeds to the patient.      Per our records, Patient had her PEG placed in 2014. Since 2016 caregiver has been utilizing bueno catheters as Peg tubes. Per our records she reports that her insurance hasn't been consistently covering PEG and other medical costs. So she has been using bueno catheters because she can reliably get them online for reasonable prices and they work well. She changes out the bueno catheter once per week. Followed up with GI last fall because her primary care wanted her to just touch base with GI, which agree is reasonable. Confirmed all of this with patient's caregiver. She is currently using an 18Fr bueno catheter with good relief. Only concern is some granulation tissues she has noted around the peg site. Past Medical History:   Diagnosis Date    Aspiration into respiratory tract     Asthma     Cerebral palsy ([address]     [facility]ure [facility] [address]     Skull fractures ([facility] [address]     @ birth      Past Surgical History:   Procedure Laterality Date    HX GI      feeding tube; nipson to help prevent asipration    IR REPLACE GASTRO/JEJUNO TUBE PERC  9/10/2021     Social History     Tobacco Use    Smoking status: Never Smoker    Smokeless tobacco: Never Used   Substance Use Topics    Alcohol use: No      Family History   Problem Relation Age of Onset    No Known Problems Mother     No Known Problems Father       No Known Allergies   Prior to Admission medications    Medication Sig Start Date End Date Taking? Authorizing Provider   dicyclomine (BENTYL) 20 mg tablet Take 1 Tablet by mouth every six (6) hours. 3/1/22  Yes Annita Sharpe MD   clonazePAM (KlonoPIN) 1 mg tablet Take 1 mg by mouth four (4) times daily. DISSOLVE ONE TABLET BY MOUTH IN THE AFTERNOON AND 3 TABLETS EVERY NIGHT AT BEDTIME FOR INSOMNIA   Yes Provider, Historical   melatonin 1 mg tablet Take 2 mg by mouth nightly.    Yes Provider, Historical   ibuprofen (MOTRIN) 600 mg tablet Take 600 mg by mouth every six (6) hours as needed for Pain (for menstrual cramping). Yes Provider, Tony   traZODone (DESYREL) 100 mg tablet Take 150 mg by mouth nightly. TAKE 1 TABLET BY MOUTH AT 8 PM AND 1/2 TABLET BY MOUTH AT 3 AM   Yes Other, MD Dewayne   baclofen (LIORESAL) 20 mg tablet Take 1 mg by mouth four (4) times daily. Yes Other, MD Dewayne       Patient Active Problem List   Diagnosis Code    Pneumonia J18.9       REVIEW OF SYSTEMS:    Constitutional: negative fever, negative chills, negative weight loss  Eyes:   negative visual changes  ENT:   negative sore throat, tongue or lip swelling   Respiratory:  negative cough, negative dyspnea  Cards:  negative for chest pain, palpitations, lower extremity edema  GI:   See HPI  :  negative for frequency, dysuria  Integument:  negative for rash and pruritus  Heme:  negative for easy bruising and gum/nose bleeding  Musculoskel: negative for myalgias,  back pain and muscle weakness  Neuro: negative for headaches, dizziness, vertigo  Psych: negative for feelings of anxiety, depression     **Patient was not able to provide review of systems due to mental status d/t cerebral palsy and nonverbal    Pertinent Positives:      Objective:   VITALS:    Visit Vitals  /84   Pulse 70   Temp 98.9 °F (37.2 °C)   Resp 18   Ht 5' 7\" (1.702 m)   Wt 74.8 kg (165 lb)   SpO2 99%   BMI 25.84 kg/m²       PHYSICAL EXAM:   General:          Alert, no distress, appears stated age. Head:               Normocephalic, without obvious abnormality, atraumatic. Eyes:               Conjunctivae clear and pale, anicteric sclerae. Pupils are equal  Nose:               Nares normal. No drainage. Throat:             Lips, mucosa, and tongue normal.    Neck:               Supple, symmetrical,    Back:               Symmetric,    Lungs:             CTA bilaterally. No wheezing/rhonchi/rales. Chest wall:      No tenderness or deformity. No Accessory muscle use. Heart:              Regular rate and rhythm,  no rub or gallop.   Abdomen:        Soft, non-tender. Not distended. Bowel sounds normal. No masses PEG in midline upper abdomen  Extremities:     Atraumatic, No cyanosis. No edema. No clubbing  Skin:                Texture, turgor normal. No rashes/lesions/jaundice  Lymph:            Cervical, supraclavicular normal.  Psych:             Mildly agitated. Neurologic:      EOMs intact. No facial asymmetry. No aphasia or slurred speech. .     LAB DATA REVIEWED:    Recent Results (from the past 24 hour(s))   SAMPLES BEING HELD    Collection Time: 05/12/22  5:50 AM   Result Value Ref Range    SAMPLES BEING HELD  PST     COMMENT        Add-on orders for these samples will be processed based on acceptable specimen integrity and analyte stability, which may vary by analyte.        IMAGING RESULTS:  I have personally reviewed the imaging reports      Total time spent with patient: 60 minutes ________________________________________________________________________  Care Plan discussed with:  Patient Beatrice HOYT              Consultant:       CT  5/12/2022:  ________________________________________________________________________    ___________________________________________________  Consulting Provider: Les Briceno NP      5/12/2022  11:55 AM

## 2022-05-12 NOTE — PROGRESS NOTES
Attempted to put the patient on CPAP nasal as the patient wear CPAP at home per her mother. For safety purposes, I put the cpap nasal vs cpap mask because of the patient's condition. Per RN, patient lasted 15 minutes on our V60. I advised the mother to have someone from home to bring the patient's cpap.

## 2022-05-13 ENCOUNTER — APPOINTMENT (OUTPATIENT)
Dept: GENERAL RADIOLOGY | Age: 37
DRG: 871 | End: 2022-05-13
Attending: STUDENT IN AN ORGANIZED HEALTH CARE EDUCATION/TRAINING PROGRAM
Payer: MEDICARE

## 2022-05-13 PROCEDURE — 94640 AIRWAY INHALATION TREATMENT: CPT

## 2022-05-13 PROCEDURE — 74018 RADEX ABDOMEN 1 VIEW: CPT

## 2022-05-13 PROCEDURE — 74011000250 HC RX REV CODE- 250: Performed by: STUDENT IN AN ORGANIZED HEALTH CARE EDUCATION/TRAINING PROGRAM

## 2022-05-13 PROCEDURE — 74011000258 HC RX REV CODE- 258: Performed by: INTERNAL MEDICINE

## 2022-05-13 PROCEDURE — 74011250637 HC RX REV CODE- 250/637: Performed by: INTERNAL MEDICINE

## 2022-05-13 PROCEDURE — 94761 N-INVAS EAR/PLS OXIMETRY MLT: CPT

## 2022-05-13 PROCEDURE — 74011250636 HC RX REV CODE- 250/636: Performed by: INTERNAL MEDICINE

## 2022-05-13 PROCEDURE — 74011250637 HC RX REV CODE- 250/637: Performed by: NURSE PRACTITIONER

## 2022-05-13 PROCEDURE — 65270000046 HC RM TELEMETRY

## 2022-05-13 RX ORDER — ZOLPIDEM TARTRATE 5 MG/1
5 TABLET ORAL ONCE
Status: COMPLETED | OUTPATIENT
Start: 2022-05-13 | End: 2022-05-13

## 2022-05-13 RX ORDER — TRAZODONE HYDROCHLORIDE 50 MG/1
50 TABLET ORAL
Qty: 30 TABLET | Refills: 0 | Status: SHIPPED | OUTPATIENT
Start: 2022-05-13 | End: 2022-06-12

## 2022-05-13 RX ORDER — GUAIFENESIN 600 MG/1
600 TABLET, EXTENDED RELEASE ORAL 2 TIMES DAILY
Qty: 20 TABLET | Refills: 0 | Status: SHIPPED | OUTPATIENT
Start: 2022-05-13 | End: 2022-05-23

## 2022-05-13 RX ORDER — AMOXICILLIN AND CLAVULANATE POTASSIUM 875; 125 MG/1; MG/1
1 TABLET, FILM COATED ORAL EVERY 12 HOURS
Qty: 10 TABLET | Refills: 0 | Status: SHIPPED | OUTPATIENT
Start: 2022-05-13 | End: 2022-05-17

## 2022-05-13 RX ADMIN — DICYCLOMINE HYDROCHLORIDE 20 MG: 20 TABLET ORAL at 12:07

## 2022-05-13 RX ADMIN — ZOLPIDEM TARTRATE 5 MG: 5 TABLET ORAL at 01:37

## 2022-05-13 RX ADMIN — IPRATROPIUM BROMIDE AND ALBUTEROL SULFATE 3 ML: .5; 3 SOLUTION RESPIRATORY (INHALATION) at 07:30

## 2022-05-13 RX ADMIN — IPRATROPIUM BROMIDE AND ALBUTEROL SULFATE 3 ML: .5; 3 SOLUTION RESPIRATORY (INHALATION) at 19:38

## 2022-05-13 RX ADMIN — DICYCLOMINE HYDROCHLORIDE 20 MG: 20 TABLET ORAL at 06:00

## 2022-05-13 RX ADMIN — CEFTRIAXONE 1 G: 1 INJECTION, POWDER, FOR SOLUTION INTRAMUSCULAR; INTRAVENOUS at 12:07

## 2022-05-13 RX ADMIN — AZITHROMYCIN MONOHYDRATE 500 MG: 500 INJECTION, POWDER, LYOPHILIZED, FOR SOLUTION INTRAVENOUS at 12:08

## 2022-05-13 RX ADMIN — CLONAZEPAM 1 MG: 1 TABLET ORAL at 12:06

## 2022-05-13 RX ADMIN — ACETAMINOPHEN 650 MG: 650 SUPPOSITORY RECTAL at 22:42

## 2022-05-13 RX ADMIN — METRONIDAZOLE 500 MG: 500 INJECTION, SOLUTION INTRAVENOUS at 12:07

## 2022-05-13 NOTE — PROGRESS NOTES
Pt is cleared for d/c from a CM standpoint. Transition of Care Plan:     RUR: 13% - low   Disposition: Home with Aunt( Primary Caregiver)  Follow up appointments: PCP  DME needed: None identified at this time; Pt has a hospital bed, edil lift, wheelchair, Tube feedings and supplies   Transportation at [address] to transport at 3 p.m.  [place] or means to access home:  Pt Aunt has access      IM Medicare Letter: provided   Is patient a BCPI-A Bundle:  Bundle letter will be distributed by unit CM if pt meets bundle criteria.                     If yes, was Bundle Letter given?:    Is patient a  and connected with the [facility]?   N/A        If yes, was [employer] transfer form completed and [facility] notified? Caregiver Contact: Aunt/Caregiver Danie Alvares [phone]  Discharge Caregiver contacted prior to discharge? Unit CM to follow up before discharge  Care Conference needed?: No    2:43 p.m. CM informed by pt's nurse, Peggy Castillo, that pt needs a procedure before d/cing. CM placed [facility] on will call per her request.    11:45 a.m. [facility] will transport at 3 p.m.    11:03 a.m. CM placed [facility] paperwork on chart. CM acknowledged d/c order. CM met with pt's aunt at bedside to discuss. Pt's aunt in agreement with d/c plan. 2IM reviewed, signed and placed on bedside chart. CM will arrange [facility] transportation. [facility] tool on door for nursing. Pt has f/u PCP appt scheduled. Care Management Interventions  PCP Verified by CM: Yes  Mode of Transport at Discharge: BLS  Transition of Care Consult (CM Consult):  Other (home with aunt)  Discharge Durable Medical Equipment: No  Physical Therapy Consult: No  Occupational Therapy Consult: No  Speech Therapy Consult: No  Support Systems: Other Family Member(s)  Confirm Follow Up Transport: Wheelchair [facility]  The Patient and/or Patient Representative was Provided with a Choice of Provider and Agrees with the Discharge Plan?: Yes  Freedom of Choice List was Provided with Basic Dialogue that Supports the Patient's Individualized Plan of Care/Goals, Treatment Preferences and Shares the Quality Data Associated with the Providers?: Yes  Discharge Location  Patient Expects to be Discharged to[de-identified] Home with family assistance    SARAH Allen  Care Management, [address] [place] [address]

## 2022-05-13 NOTE — PROGRESS NOTES
ADULT PROTOCOL: JET AEROSOL  REASSESSMENT    Patient  Tamara Pelaez     31 y.o.   female     5/12/2022  10:04 PM    Breath Sounds Pre Procedure: Right Breath Sounds: Coarse                               Left Breath Sounds: Coarse    Breath Sounds Post Procedure: Right Breath Sounds: Clear,Diminished                                 Left Breath Sounds: Clear,Diminished    Breathing pattern: Pre procedure Breathing Pattern: Regular          Post procedure Breathing Pattern: Regular    Heart Rate: Pre procedure Pulse: 101           Post procedure Pulse: 103    Resp Rate: Pre procedure Respirations: 18           Post procedure Respirations: 18    Peak Flow: Pre bronchodilator             Post bronchodilator       FVC/FEV1:  n/a    Incentive Spirometry:             Cough: Pre procedure Cough: Non-productive               Post procedure Cough: Non-productive    Suctioned: NO    Sputum: Pre procedure                   Post procedure      Oxygen: O2 Device: None (Room air)   RA     Changed: NO    SpO2: Pre procedure SpO2: 99 %   without oxygen              Post procedure SpO2: 98 %  without oxygen    Nebulizer Therapy: Current medications Aerosolized Medications: DuoNeb      Changed: NO    Smoking History: n/a    Problem List:   Patient Active Problem List   Diagnosis Code    Pneumonia J18.9       Respiratory Therapist: Refugio Tomlinson RT

## 2022-05-13 NOTE — DISCHARGE SUMMARY
Hospitalist Discharge Summary     Patient ID:  Tito Cabot  [id number]  31 y.o.  1985  5/9/2022    PCP on record: Ira Henderson MD    Admit date: 5/9/2022  Discharge date and time: 5/13/2022    DISCHARGE DIAGNOSIS:    Sepsis  Community-acquired pneumonia  Diarrhea  UTI  Seizure disorder  Cerebral palsy-nonverbal at baseline  Dysphagia with PEG tube feeding    CONSULTATIONS:  IP CONSULT TO HOSPITALIST  IP CONSULT TO GASTROENTEROLOGY    Excerpted HPI from H&P of Luis Sellers MD:  Tito Cabot is a 31 y.o.  female with PMHx significant for CP, nonverbal at baseline, has a G tube, seizure disorder presents to the ER for evaluation of poor appetite and po intake. Her aunt noticed increased work of breathing but denies any fever or cough. In the ER, pt is febrile with Tm 103.2 and pna was seen on CXR. She was given empiric IV abx. Vitals/labs/imaging reviewed. ______________________________________________________________________  DISCHARGE SUMMARY/HOSPITAL COURSE:  for full details see H&P, daily progress notes, labs, consult notes. Sepsis (Tm 103.4, tachycardia , RR35)  CAP  Hx of aspiration pna  Diarrhea   UTI POA  CXR showed low lung volumes with possible left lower lobe infiltrate  Rapid covid and influenza a/b neg  Respiratory panel negative. UA suggestive of UTI, culture shows mixed olman. Blood culture negative so far. Decrease IV fluids. Continue IV rocephin/aztihromycin/flagyl  Discontinue C. difficile and enteric bacterial panel as the patient is not having any diarrhea since admission. Nebs, tessalon perls prn  On room air. GI consulted for the PEG tube placement. .  GI consulted-appreciate input. No intervention required.     Seizure disorder, not on seizure meds  CP, nonverbal at baseline  Dysphagia, has a G tube, will consult nutrition to help with TF  Pharmacy to help with med rec  Resume tube feedings today.   Consulted dietitian     Updated the caregiver present at the bedside      _______________________________________________________________________  Patient seen and examined by me on discharge day. Pertinent Findings:  Gen:    Not in distress  Chest: Clear lungs  CVS:   Regular rhythm. No edema  Abd:  Soft, not distended, not tender  Neuro:  Alert,   _______________________________________________________________________  DISCHARGE MEDICATIONS:   Current Discharge Medication List      START taking these medications    Details   amoxicillin-clavulanate (Augmentin) 875-125 mg per tablet Take 1 Tablet by mouth every twelve (12) hours for 5 days. Qty: 10 Tablet, Refills: 0  Start date: 5/13/2022, End date: 5/18/2022      guaiFENesin ER (Mucinex) 600 mg ER tablet Take 1 Tablet by mouth two (2) times a day for 10 days. Qty: 20 Tablet, Refills: 0  Start date: 5/13/2022, End date: 5/23/2022         CONTINUE these medications which have CHANGED    Details   traZODone (DESYREL) 50 mg tablet Take 1 Tablet by mouth nightly for 30 days. At 0330  Qty: 30 Tablet, Refills: 0  Start date: 5/13/2022, End date: 6/12/2022         CONTINUE these medications which have NOT CHANGED    Details   dicyclomine (BENTYL) 20 mg tablet Take 1 Tablet by mouth every six (6) hours. Qty: 20 Tablet, Refills: 0      clonazePAM (KlonoPIN) 1 mg tablet Take 1 mg by mouth four (4) times daily. DISSOLVE ONE TABLET BY MOUTH IN THE AFTERNOON AND 3 TABLETS EVERY NIGHT AT BEDTIME FOR INSOMNIA      melatonin 1 mg tablet Take 2 mg by mouth nightly. ibuprofen (MOTRIN) 600 mg tablet Take 600 mg by mouth every six (6) hours as needed for Pain (for menstrual cramping). baclofen (LIORESAL) 20 mg tablet Take 1 mg by mouth four (4) times daily. Patient Follow Up Instructions:    Activity: Activity as tolerated  Diet: PEG feeding as directed  Wound Care: None needed    If you have questions regarding the hospital related prescriptions or hospital related issues please call [phone] [facility] at . You can always direct your questions to your primary care doctor if you are unable to reach your hospital physician; your PCP works as an extension of your hospital doctor just like your hospital doctor is an extension of your PCP for your time at [facility]. If you experience any of the following symptoms then please call your primary care physician or return to the emergency room if you cannot get hold of your doctor:  Fever, chills, nausea, vomiting, diarrhea, change in mentation, falling, bleeding, shortness of breath    Follow-up Information     Follow up With Specialties Details Why Contact Shavonne Sarmiento NP Nurse Practitioner Go on 5/20/2022 at 11:30am for your PCP hospital follow up. Dr. Janna Florentino did not have any availabilty. Please arrive 20 minutes early, bring photo ID, insurance cards, copay, medication bottles and any completed forms.  Yessy  [phone]          ________________________________________________________________    Risk of deterioration: Low    Condition at Discharge:  Stable  __________________________________________________________________    Disposition  Home with family, no needs    ____________________________________________________________________    Code Status: Full Code  ___________________________________________________________________      Total time in minutes spent coordinating this discharge (includes going over instructions, follow-up, prescriptions, and preparing report for sign off to her PCP) :  >30 minutes    Signed:  Laina Lancaster MD

## 2022-05-13 NOTE — PROGRESS NOTES
Hospital follow-up PCP transitional care appointment has been scheduled with NP Teodoro Hart on 5/20/22 at 1130. CMA noted on AVS about location change and reason for the change. Pending patient discharge.   Marvene Fleischer, Care Management Assistant

## 2022-05-13 NOTE — PROGRESS NOTES
Received notification from bedside RN about patient with regards to: caregiver requesting medication for patient to help her relax and get some sleep  VS: /91, HR 67, RR 22, O2 sat 96% on RA    Intervention given: Ambien 5 mg per G tube x 1 dose ordered

## 2022-05-13 NOTE — DISCHARGE INSTRUCTIONS
HOSPITALIST DISCHARGE INSTRUCTIONS    NAME: Ronald Pruitt   :  1985   MRN:  [id number]     Date/Time:  2022 10:47 AM    ADMIT DATE: 2022   DISCHARGE DATE: 2022     Attending Physician: James Rivera MD    DISCHARGE DIAGNOSIS:  Sepsis  Community-acquired pneumonia  Diarrhea  UTI  Seizure disorder  Cerebral palsy-nonverbal at baseline  Dysphagia with PEG tube feeding    MEDICATIONS:  See above    · It is important that you take the medication exactly as they are prescribed. · Keep your medication in the bottles provided by the pharmacist and keep a list of the medication names, dosages, and times to be taken in your wallet. · Do not take other medications without consulting your doctor. Pain Management: per above medications    What to do at [address]:  Cardiac Diet    Recommended activity: Activity as tolerated    If you have questions regarding the hospital related prescriptions or hospital related issues please call [facility] at . You can always direct your questions to your primary care doctor if you are unable to reach your hospital physician; your PCP works as an extension of your hospital doctor just like your hospital doctor is an extension of your PCP for your time at [address]. If you experience any of the following symptoms then please call your primary care physician or return to the emergency room if you cannot get hold of your doctor:  Fever, chills, nausea, vomiting, diarrhea, change in mentation, falling, bleeding, shortness of breath    Additional Instructions:      Bring these papers with you to your follow up appointments. The papers will help your doctors be sure to continue the care plan from the hospital.              Information obtained by :  I understand that if any problems occur once I am at home I am to contact my physician.     I understand and acknowledge receipt of the instructions indicated above.                                                                                                                                            Physician's or R.N.'s Signature                                                                  Date/Time                                                                                                                                              Patient or Representative Signature                                                          Da

## 2022-05-14 ENCOUNTER — APPOINTMENT (OUTPATIENT)
Dept: GENERAL RADIOLOGY | Age: 37
DRG: 871 | End: 2022-05-14
Attending: SPECIALIST
Payer: MEDICARE

## 2022-05-14 PROCEDURE — 65270000046 HC RM TELEMETRY

## 2022-05-14 PROCEDURE — 74011250637 HC RX REV CODE- 250/637: Performed by: INTERNAL MEDICINE

## 2022-05-14 PROCEDURE — 74011250636 HC RX REV CODE- 250/636: Performed by: NURSE PRACTITIONER

## 2022-05-14 PROCEDURE — 74018 RADEX ABDOMEN 1 VIEW: CPT

## 2022-05-14 PROCEDURE — 74011250636 HC RX REV CODE- 250/636: Performed by: STUDENT IN AN ORGANIZED HEALTH CARE EDUCATION/TRAINING PROGRAM

## 2022-05-14 PROCEDURE — 74011250636 HC RX REV CODE- 250/636: Performed by: INTERNAL MEDICINE

## 2022-05-14 PROCEDURE — 74011000250 HC RX REV CODE- 250: Performed by: INTERNAL MEDICINE

## 2022-05-14 PROCEDURE — 94640 AIRWAY INHALATION TREATMENT: CPT

## 2022-05-14 PROCEDURE — 74011000636 HC RX REV CODE- 636: Performed by: SPECIALIST

## 2022-05-14 RX ORDER — LORAZEPAM 2 MG/ML
1 INJECTION INTRAMUSCULAR ONCE
Status: COMPLETED | OUTPATIENT
Start: 2022-05-14 | End: 2022-05-14

## 2022-05-14 RX ORDER — LORAZEPAM 2 MG/ML
1 INJECTION INTRAMUSCULAR EVERY 6 HOURS
Status: DISCONTINUED | OUTPATIENT
Start: 2022-05-14 | End: 2022-05-14

## 2022-05-14 RX ADMIN — METRONIDAZOLE 500 MG: 500 INJECTION, SOLUTION INTRAVENOUS at 21:21

## 2022-05-14 RX ADMIN — DICYCLOMINE HYDROCHLORIDE 20 MG: 20 TABLET ORAL at 21:23

## 2022-05-14 RX ADMIN — LORAZEPAM 1 MG: 2 INJECTION INTRAMUSCULAR; INTRAVENOUS at 11:04

## 2022-05-14 RX ADMIN — METRONIDAZOLE 500 MG: 500 INJECTION, SOLUTION INTRAVENOUS at 01:47

## 2022-05-14 RX ADMIN — CLONAZEPAM 1 MG: 1 TABLET ORAL at 21:22

## 2022-05-14 RX ADMIN — LORAZEPAM 1 MG: 2 INJECTION INTRAMUSCULAR; INTRAVENOUS at 22:49

## 2022-05-14 RX ADMIN — IPRATROPIUM BROMIDE AND ALBUTEROL SULFATE 3 ML: .5; 3 SOLUTION RESPIRATORY (INHALATION) at 19:39

## 2022-05-14 RX ADMIN — DIATRIZOATE MEGLUMINE AND DIATRIZOATE SODIUM 30 ML: 660; 100 LIQUID ORAL; RECTAL at 17:59

## 2022-05-14 RX ADMIN — METRONIDAZOLE 500 MG: 500 INJECTION, SOLUTION INTRAVENOUS at 09:32

## 2022-05-14 RX ADMIN — SODIUM CHLORIDE, PRESERVATIVE FREE 10 ML: 5 INJECTION INTRAVENOUS at 22:47

## 2022-05-14 NOTE — PROGRESS NOTES
Verbal shift change report given to Jack Burr (oncoming nurse) by Severiano Bibles (offgoing nurse). Report included the following information SBAR, Kardex, Intake/Output, MAR, Recent Results and Cardiac Rhythm NSR. Pt PEG clogged. KUB was completed prior to my assuming care of Pt. KUB resulted. Attempted to unclog the PEG without success. Perfect served Attending following message:    [phone] Hospital or Facility: [facility] From: Jeremy Metcalf RE: Siri Done 1985 RM: [id number] Pt had KUB concerning clogged PEG/no residual. KUB did not show any blockage. Pt PEG remains unusable due to clog past point of entry into ABD. Multiple staff attempted to unclog. Family is requesting that tube be replaced. Need Callback: NO CALLBACK [facility] MEDICAL TELEMETRY    Read 5:50 PM    Received the following messages from Attendin22 5:50 PM  Sure we can do that    22 5:52 PM  Cancel the discharge    Attending cancelled Discharge. At approximately 1800, Pt IV was dislodged. Staff was notified. Spoke with oncoming Charge RN for Quarterly concerning need for IV, Pt needing IV site, requesting possible US placement by ER tech. Charge RN to call after shift report. Staff attempted to explain this to family.

## 2022-05-14 NOTE — PROGRESS NOTES
Spiritual Care Assessment/Progress Note  [facility]      NAME: Jackie Gutierrez      MRN: [id number]  AGE: 31 y.o. SEX: female  Mandaen Affiliation: Jehovah witness   Language: English     5/14/2022     Total Time (in minutes): 21     Spiritual Assessment begun in [facility] 2 MED TELE through conversation with:         []Patient        [x] Family    [] Friend(s)        Reason for Consult: Initial/Spiritual assessment, patient floor     Spiritual beliefs: (Please include comment if needed)     [x] Identifies with a madelyn tradition:  Jepeytonvah Witness       [] Supported by a madelyn community:            [] Claims no spiritual orientation:           [] Seeking spiritual identity:                [] Adheres to an individual form of spirituality:           [] Not able to assess:                           Identified resources for coping:      [x] Prayer                               [x] Music                  [] Guided Imagery     [x] Family/friends                 [] Pet visits     [] Devotional reading                         [] Unknown     [] Other:                                             Interventions offered during this visit: (See comments for more details)    Patient Interventions: Initial/Spiritual assessment, patient floor     Family/Friend(s):  Affirmation of emotions/emotional suffering,Affirmation of madelyn,Catharsis/review of pertinent events in supportive environment,Coping skills reviewed/reinforced,Iconic (affirming the presence of God/Higher Power),Normalization of emotional/spiritual concerns,Prayer (assurance of),Mandaen beliefs/image of God discussed     Plan of Care:     [x] Support spiritual and/or cultural needs    [] Support AMD and/or advance care planning process      [] Support grieving process   [] Coordinate Rites and/or Rituals    [] Coordination with community clergy   [x] No spiritual needs identified at this time   [] Detailed Plan of Care below (See Comments)  [] Make referral to Music Therapy  [] Make referral to Pet Therapy     [] Make referral to Addiction services  [] Make referral to [facility]  [] Make referral to Spiritual Care Partner  [] No future visits requested        [x] Contact Spiritual Care for further referrals     Comments:  Reviewed chart prior to visit on [facility] for spiritual assessment. Patient's aunt, Kaiden Robles, who is her primary caregiver, was present. She has been staying with Pineda Harrington during this admission. She shared it is very difficult to find an aide to come into the home. Tony Trinidad has sisters who come help at times to give her a break. Tony Trinidad shared the family identifies as Jehovah Witness. Her madelyn has helped her cope as she has cared for Pineda Harrington the last three years; her mother cared for Pineda Harrington before that. She shared she prays daily for strength. Offered bedside presence and empathic listening. Offered assurance of prayer and advised of ongoing availability of pastoral support.      MATTHEW Barber, [facility], Staff [address]    [address] Paging Service  [phone])

## 2022-05-14 NOTE — PROGRESS NOTES
Problem: Risk for Spread of Infection  Goal: Prevent transmission of infectious organism to others  Description: Prevent the transmission of infectious organisms to other patients, staff members, and visitors. Outcome: Progressing Towards Goal     Problem: Patient Education:  Go to Education Activity  Goal: Patient/Family Education  Outcome: Progressing Towards Goal     Problem: Pressure Injury - Risk of  Goal: *Prevention of pressure injury  Description: Document Isaac Scale and appropriate interventions in the flowsheet. Outcome: Progressing Towards Goal  Note: Pressure Injury Interventions:  Sensory Interventions: Assess changes in LOC    Moisture Interventions: Absorbent underpads    Activity Interventions: Pressure redistribution bed/mattress(bed type)    Mobility Interventions: Assess need for specialty bed    Nutrition Interventions: Document food/fluid/supplement intake    Friction and Shear Interventions: Apply protective barrier, creams and emollients,HOB 30 degrees or less,Lift sheet                Problem: Patient Education: Go to Patient Education Activity  Goal: Patient/Family Education  Outcome: Progressing Towards Goal     Problem: Falls - Risk of  Goal: *Absence of Falls  Description: Document Megan Fall Risk and appropriate interventions in the flowsheet.   Outcome: Progressing Towards Goal  Note: Fall Risk Interventions:  Mobility Interventions: Assess mobility with egress test,Bed/chair exit alarm    Mentation Interventions: Adequate sleep, hydration, pain control    Medication Interventions: Bed/chair exit alarm    Elimination Interventions: Bed/chair exit alarm,Call light in reach              Problem: Patient Education: Go to Patient Education Activity  Goal: Patient/Family Education  Outcome: Progressing Towards Goal

## 2022-05-14 NOTE — PROGRESS NOTES
Spoke with Andres at Dr. Camacho Pill office and left message requesting order for contrast for KUB.

## 2022-05-14 NOTE — PROGRESS NOTES
Hospitalist Progress Note    NAME: Hali Daniel   :  1985   MRN:  [id number]       Assessment / Plan:    Sepsis (Tm 103.4, tachycardia , RR35)  CAP  Hx of aspiration pna  Diarrhea   UTI POA  CXR showed low lung volumes with possible left lower lobe infiltrate  Rapid covid and influenza a/b neg  Respiratory panel negative. UA suggestive of UTI, culture shows mixed olman. Blood culture negative so far. Decrease IV fluids. Continue IV rocephin/aztihromycin/flagyl  Discontinue C. difficile and enteric bacterial panel as the patient is not having any diarrhea since admission. Nebs, tessalon perls prn  On room air. GI consulted for the PEG tube placement. Discussed with GI today in detail about the PEG tube, PEG tube has a [place] [address] which is replaced today. KUB ordered, if in place then will start back on the feeding. If patient tolerate feeding well then discharge tomorrow.     Seizure disorder, not on seizure meds  CP, nonverbal at baseline  Dysphagia, has a G tube, will consult nutrition to help with TF  Pharmacy to help with med rec  Resume tube feedings today. Consulted dietitian    Updated the caregiver present at the bedside    25.0 - 29.9 Overweight / Body mass index is 25.84 kg/m². Estimated discharge date: May 12  Barriers: Clinical improvement, GI recommendations for PEG tube placement    Code status: Full  Prophylaxis: Lovenox  Recommended Disposition: Home w/Family     Subjective:     Chief Complaint / Reason for Physician Visit  Patient seen and examined today, doing fine, updated the caregiver present at bedside. Discussed with RN events overnight. Objective:     VITALS:   Last 24hrs VS reviewed since prior progress note.  Most recent are:  Patient Vitals for the past 24 hrs:   Temp Pulse Resp BP SpO2   22 1048 98 °F (36.7 °C) 98 -- 110/70 98 %   22 0803 97.8 °F (36.6 °C) 91 -- 102/68 97 %   22 0339 98.4 °F (36.9 °C) 92 -- 107/71 96 % 05/14/22 0125 98.3 °F (36.8 °C) -- -- -- --   05/14/22 0023 99.3 °F (37.4 °C) 100 -- (!) 84/53 96 %   05/13/22 2148 100.2 °F (37.9 °C) (!) 107 -- 118/69 96 %   05/13/22 1938 -- -- -- -- 95 %   05/13/22 1600 98.8 °F (37.1 °C) 90 17 110/68 95 %       Intake/Output Summary (Last 24 hours) at 5/14/2022 1237  Last data filed at 5/13/2022 1600  Gross per 24 hour   Intake 0 ml   Output --   Net 0 ml        I had a face to face encounter and independently examined this patient on 5/14/2022, as outlined below:  PHYSICAL EXAM:  General: WD, WN. Awakw, cooperative, no acute distress    EENT:  EOMI. Anicteric sclerae. MMM  Resp:  Decreased breath sounds bilaterally, rhonchi lower lobes  CV:  Regular  rhythm,  No edema  GI:  Soft, Non distended, Non tender. +Bowel sounds  Neurologic:  Cerebral palsy patient, awake but not alert, normal speech,   Psych:   Good insight. Not anxious nor agitated  Skin:  No rashes. No jaundice    Reviewed most current lab test results and cultures  YES  Reviewed most current radiology test results   YES  Review and summation of old records today    NO  Reviewed patient's current orders and MAR    YES  PMH/SH reviewed - no change compared to H&P  ________________________________________________________________________  Care Plan discussed with:    Comments   Patient     Family  x  caregiver   RN x    Care Manager     Consultant                       x Multidiciplinary team rounds were held today with , nursing, pharmacist and clinical coordinator. Patient's plan of care was discussed; medications were reviewed and discharge planning was addressed.      ________________________________________________________________________  Total NON critical care TIME: 36   minutes    Total CRITICAL CARE TIME Spent:   Minutes non procedure based      Comments   >50% of visit spent in counseling and coordination of care ________________________________________________________________________  Ross Tadeo MD     Procedures: see electronic medical records for all procedures/Xrays and details which were not copied into this note but were reviewed prior to creation of Plan. LABS:  I reviewed today's most current labs and imaging studies. Pertinent labs include:  No results for input(s): WBC, HGB, HCT, PLT, HGBEXT, HCTEXT, PLTEXT, HGBEXT, HCTEXT, PLTEXT in the last 72 hours. No results for input(s): NA, K, CL, CO2, GLU, BUN, CREA, CA, MG, PHOS, ALB, TBIL, TBILI, ALT, INR, INREXT, INREXT in the last 72 hours.     No lab exists for component: SGOT    Signed: Ross Tadeo MD

## 2022-05-14 NOTE — PROGRESS NOTES
Called by Dr. Tra Tate to evaluate G tube in place that will not flush. Aunt at bedside. 18 FR bueno catheter in place. Reviewed detailed GI note / consult from 5/12/22. Discussed with CHARLA French and obtained a new 18 FR Bueno catheter and confirmed w aunt at bedside that this is their home routine to exchange bueno in stomach every few weeks. I therafter removed the 18 FR Bueno that was in place by deflating the 10 ml balloon. It slid out without trouble. We then reinserted the new 18 FR bueno catheter via stoma into stomach and inflated balloon with 10 ml saline. It was well anchored in place and dressing applied. Plan:  Verify tube in stomach w xray after contrast via bueno/gtube in stomach then resume tube feedings per protocol.

## 2022-05-14 NOTE — PROGRESS NOTES
Comprehensive Nutrition Assessment    Type and Reason for Visit: Reassess    Nutrition Recommendations/Plan:   1. Resume TF at goal once G tube placement confirmed  2. Recommend given prn miralax daily (last BM was 5 days ago!)  3. Make sure to flush G tube with 30mL water before/after residuals checks and med administration as well as continuing scheduled flushes q 4h (should help with patency)          Nutrition Assessment:  Chart reviewed, yesterdays discharge order was canceled. Pt in need of 'G tube' replacement. At home she has an 18F bueno tube in gastrostomy, this is replaced every few weeks. Tube replaced this afternoon, awaiting confirmation of placement by radiology then okay to resume TF per MD note. TF meets 100% kcal and protein needs (protein needs re-estimated). No new labs since Wednesday. Noted no BM since Monday, pt has prn miralax available but it has not been given. Would give daily until pt stools. Nutrition Related Findings:    Meds: flagyl, prn miralax. BM 5/9 Wound Type: None    Current Nutrition Intake & Therapies:  Average Meal Intake: NPO     ADULT TUBE FEEDING PEG; Standard with Fiber; Delivery Method: Continuous; Continuous Initial Rate (mL/hr): 10; Continuous Advance Tube Feeding: Yes; Advancement Volume (mL/hr): 10; Advancement Frequency: Q 6 hours; Continuous Goal Rate (mL/hr): 40. .. Anthropometric Measures:  Height: 5' 7\" (170.2 cm)  Ideal Body Weight (IBW): 135 lbs (61 kg)     Current Body Wt:  74.8 kg (164 lb 14.5 oz), 122.2 % IBW. Stated (stated but aunt (primary caregiver requests an updated bedscale wt))  Current BMI (kg/m2): 25.8                          BMI Category: Overweight (BMI 25.0-29. 9)    Estimated Daily Nutrient Needs:  Energy Requirements Based On: Formula  Weight Used for Energy Requirements: Current  Energy (kcal/day): 1536 (BMR 1480 x 1. 2AF) - 250 kcals  Weight Used for Protein Requirements: Current  Protein (g/day): 60g (0.8gPro/kg)  Method Used for Fluid Requirements: 1 ml/kcal  Fluid (ml/day): 1500 ml/day    Nutrition Diagnosis:   · Inadequate protein-energy intake related to swallowing difficulty as evidenced by nutrition support-enteral nutrition      Nutrition Interventions:   Food and/or Nutrient Delivery: Start tube feeding  Nutrition Education/Counseling: No recommendations at this time  Coordination of Nutrition Care: Continue to monitor while inpatient       Goals:  Previous Goal Met: Goal(s) achieved  Goals:  Tolerate nutrition support at goal rate,by next RD assessment,within 2 days (with residuals <500mL and a BM in 2-4 days)       Nutrition Monitoring and Evaluation:   Behavioral-Environmental Outcomes: None identified  Food/Nutrient Intake Outcomes: Enteral nutrition intake/tolerance  Physical Signs/Symptoms Outcomes: Biochemical data,Skin,Weight,GI status    Discharge Planning:    Enteral nutrition    Yeni Diaz RD, CNSC  Contact: ext [phone]

## 2022-05-15 ENCOUNTER — APPOINTMENT (OUTPATIENT)
Dept: CT IMAGING | Age: 37
DRG: 871 | End: 2022-05-15
Attending: STUDENT IN AN ORGANIZED HEALTH CARE EDUCATION/TRAINING PROGRAM
Payer: MEDICARE

## 2022-05-15 LAB
BACTERIA SPEC CULT: NORMAL
BACTERIA SPEC CULT: NORMAL
SERVICE CMNT-IMP: NORMAL
SERVICE CMNT-IMP: NORMAL

## 2022-05-15 PROCEDURE — 74011250636 HC RX REV CODE- 250/636: Performed by: STUDENT IN AN ORGANIZED HEALTH CARE EDUCATION/TRAINING PROGRAM

## 2022-05-15 PROCEDURE — 74011000636 HC RX REV CODE- 636: Performed by: STUDENT IN AN ORGANIZED HEALTH CARE EDUCATION/TRAINING PROGRAM

## 2022-05-15 PROCEDURE — 74177 CT ABD & PELVIS W/CONTRAST: CPT

## 2022-05-15 PROCEDURE — 74011250636 HC RX REV CODE- 250/636: Performed by: SPECIALIST

## 2022-05-15 PROCEDURE — 74011250636 HC RX REV CODE- 250/636: Performed by: INTERNAL MEDICINE

## 2022-05-15 PROCEDURE — 74011250637 HC RX REV CODE- 250/637: Performed by: INTERNAL MEDICINE

## 2022-05-15 PROCEDURE — 74011250636 HC RX REV CODE- 250/636: Performed by: NURSE PRACTITIONER

## 2022-05-15 PROCEDURE — 74011000250 HC RX REV CODE- 250: Performed by: SPECIALIST

## 2022-05-15 PROCEDURE — 65270000046 HC RM TELEMETRY

## 2022-05-15 PROCEDURE — 74011000250 HC RX REV CODE- 250: Performed by: INTERNAL MEDICINE

## 2022-05-15 PROCEDURE — C9113 INJ PANTOPRAZOLE SODIUM, VIA: HCPCS | Performed by: SPECIALIST

## 2022-05-15 RX ORDER — LORAZEPAM 2 MG/ML
1 INJECTION INTRAMUSCULAR
Status: DISCONTINUED | OUTPATIENT
Start: 2022-05-15 | End: 2022-05-17 | Stop reason: HOSPADM

## 2022-05-15 RX ORDER — LORAZEPAM 2 MG/ML
1 INJECTION INTRAMUSCULAR
Status: COMPLETED | OUTPATIENT
Start: 2022-05-15 | End: 2022-05-15

## 2022-05-15 RX ORDER — FENTANYL CITRATE 50 UG/ML
25 INJECTION, SOLUTION INTRAMUSCULAR; INTRAVENOUS ONCE
Status: COMPLETED | OUTPATIENT
Start: 2022-05-15 | End: 2022-05-15

## 2022-05-15 RX ORDER — IPRATROPIUM BROMIDE AND ALBUTEROL SULFATE 2.5; .5 MG/3ML; MG/3ML
3 SOLUTION RESPIRATORY (INHALATION)
Status: DISCONTINUED | OUTPATIENT
Start: 2022-05-15 | End: 2022-05-17 | Stop reason: HOSPADM

## 2022-05-15 RX ADMIN — LORAZEPAM 1 MG: 2 INJECTION INTRAMUSCULAR; INTRAVENOUS at 18:42

## 2022-05-15 RX ADMIN — IOPAMIDOL 100 ML: 755 INJECTION, SOLUTION INTRAVENOUS at 11:00

## 2022-05-15 RX ADMIN — SODIUM CHLORIDE 40 MG: 9 INJECTION, SOLUTION INTRAMUSCULAR; INTRAVENOUS; SUBCUTANEOUS at 10:40

## 2022-05-15 RX ADMIN — METRONIDAZOLE 500 MG: 500 INJECTION, SOLUTION INTRAVENOUS at 22:38

## 2022-05-15 RX ADMIN — METRONIDAZOLE 500 MG: 500 INJECTION, SOLUTION INTRAVENOUS at 09:06

## 2022-05-15 RX ADMIN — SODIUM CHLORIDE 40 MG: 9 INJECTION, SOLUTION INTRAMUSCULAR; INTRAVENOUS; SUBCUTANEOUS at 22:39

## 2022-05-15 RX ADMIN — LORAZEPAM 1 MG: 2 INJECTION INTRAMUSCULAR; INTRAVENOUS at 17:06

## 2022-05-15 RX ADMIN — TRAZODONE HYDROCHLORIDE 50 MG: 50 TABLET ORAL at 22:00

## 2022-05-15 RX ADMIN — LORAZEPAM 1 MG: 2 INJECTION INTRAMUSCULAR; INTRAVENOUS at 09:05

## 2022-05-15 RX ADMIN — SODIUM CHLORIDE, PRESERVATIVE FREE 10 ML: 5 INJECTION INTRAVENOUS at 14:04

## 2022-05-15 RX ADMIN — FENTANYL CITRATE 25 MCG: 50 INJECTION, SOLUTION INTRAMUSCULAR; INTRAVENOUS at 01:21

## 2022-05-15 RX ADMIN — SODIUM CHLORIDE, PRESERVATIVE FREE 10 ML: 5 INJECTION INTRAVENOUS at 22:00

## 2022-05-15 RX ADMIN — DICYCLOMINE HYDROCHLORIDE 20 MG: 20 TABLET ORAL at 04:11

## 2022-05-15 NOTE — PROGRESS NOTES
Gastroenterology Daily Progress Note (Dr. Lolis Warren)   [address] [place] [address] Dr Parrish Date: 5/9/2022       Subjective:       Patient was seen with her Aunt at bedside. Her Tube feeds were started back yesterday evening at 10 ml/hr but she was crying out in pain. Not able to receive her home PO meds including clonazepam 1 mg QID. Received one dose of IV ativan today and helped her to relax. CT result d/w pt's aunt.     Current Facility-Administered Medications   Medication Dose Route Frequency    LORazepam (ATIVAN) injection 1 mg  1 mg IntraVENous Q6H PRN    albuterol-ipratropium (DUO-NEB) 2.5 MG-0.5 MG/3 ML  3 mL Nebulization Q6H PRN    pantoprazole (PROTONIX) 40 mg in 0.9% sodium chloride 10 mL injection  40 mg IntraVENous Q12H    sodium chloride (NS) flush 5-10 mL  5-10 mL IntraVENous PRN    sodium chloride (NS) flush 5-40 mL  5-40 mL IntraVENous Q8H    sodium chloride (NS) flush 5-40 mL  5-40 mL IntraVENous PRN    acetaminophen (TYLENOL) tablet 650 mg  650 mg Oral Q6H PRN    Or    acetaminophen (TYLENOL) suppository 650 mg  650 mg Rectal Q6H PRN    polyethylene glycol (MIRALAX) packet 17 g  17 g Oral DAILY PRN    ondansetron (ZOFRAN ODT) tablet 4 mg  4 mg Oral Q8H PRN    Or    ondansetron (ZOFRAN) injection 4 mg  4 mg IntraVENous Q6H PRN    [Held by provider] enoxaparin (LOVENOX) injection 40 mg  40 mg SubCUTAneous DAILY    clonazePAM (KlonoPIN) tablet 1 mg  1 mg Oral QID    dicyclomine (BENTYL) tablet 20 mg  20 mg Oral Q6H    traZODone (DESYREL) tablet 50 mg  50 mg Oral QHS    albuterol-ipratropium (DUO-NEB) 2.5 MG-0.5 MG/3 ML  3 mL Nebulization Q6H PRN    benzonatate (TESSALON) capsule 100 mg  100 mg Oral TID PRN    metroNIDAZOLE (FLAGYL) IVPB premix 500 mg  500 mg IntraVENous Q12H    baclofen (LIORESAL) tablet 20 mg  20 mg Oral Q6H PRN        Objective:     Visit Vitals  /67 (BP 1 Location: Right lower arm, BP Patient Position: At rest)   Pulse (!) 104   Temp 97.8 °F (36.6 °C)   Resp 20   Ht 5' 7\" (1.702 m)   Wt 74.8 kg (165 lb)   SpO2 97%   BMI 25.84 kg/m²   Blood pressure 116/67, pulse (!) 104, temperature 97.8 °F (36.6 °C), resp. rate 20, height 5' 7\" (1.702 m), weight 74.8 kg (165 lb), SpO2 97 %. No intake/output data recorded. 05/13 1901 - 05/15 0700  In: 120   Out: 0     Intake/Output Summary (Last 24 hours) at 5/15/2022 1214  Last data filed at 5/14/2022 1940  Gross per 24 hour   Intake 120 ml   Output 0 ml   Net 120 ml     Physical Exam:     General: BF lying in bed with spasticity, agitated at times  GI: Soft, NT, ND + bowel sounds + Mitchell tube in G tube stoma without erythema    CT abd/pelvis w contrast:    FINDINGS:  Abdomen: There is a small to moderate paraesophageal hernia. The lung bases are  clear. The heart is at the upper limits of normal in size. The right hepatic  artery is replaced from the superior mesenteric artery, a normal variant. A  percutaneous gastrostomy tube tethers the gastric body anteroinferiorly. The  distal esophagus, duodenum, liver, gallbladder, pancreas, spleen, adrenals, and  kidneys are normal.     Pelvis: The uterus is replaced and enlarged by numerous fibroids. It measures  166 x 129 x 212 mm. There is probably a small pedunculated fibroid arising from  the right uterine wall (2-61, 602-39). This is stable from 12/7/2021. Alternatively, it could represent a mildly enlarged ovary.     Though not documented, there appears to be oral contrast administered. This has  progressed rapidly through the small and large bowel, rectum, and anal canal,  with oral contrast in the gluteal cleft.     The right hip is chronically and superiorly dislocated. The acetabulum is  shallow/dysplastic. A pseudoarticulation has formed between the right iliac and  the femoral head, with remodeling of both (602-46).    IMPRESSION  1. Rapid transit of oral contrast.  2. Small to moderate paraesophageal hernia. 3. Enlarged, fibroid uterus.   4. Chronic, superior, right hip dislocation. Dysplastic right acetabulum.     Impression:  Feeding difficulties (18 FR Mitchell replaced yesterday at bedside with KUB showing contrast in stomach)    Post feeding pain/distress    Anxiety on benzodiazepines    Cerebral Palsy    Paraesophageal hernia on CT           Plan:  -ok to resume TF at low rate  -will start pt on Pepcid 20 mg IV BID given risk for esophagitis/GERD with her paraesoph hernia  -IV ativan per hospitalist  -family requests to f/u with Dr. Levi Night her regular GI after discharge         Buddy Pelaez MD    5/15/2022  [id number] [facility], [address] [place] [address]  [phone]  [address] [place] [address] [place]: [phone]

## 2022-05-15 NOTE — PROGRESS NOTES
Hospitalist Progress Note    NAME: Grabiel Galloway   :  1985   MRN:  [id number]       Assessment / Plan:    Sepsis (Tm 103.4, tachycardia , RR35)  CAP  Hx of aspiration pna  Diarrhea   UTI POA  CXR showed low lung volumes with possible left lower lobe infiltrate  Rapid covid and influenza a/b neg  Respiratory panel negative. UA suggestive of UTI, culture shows mixed olman. Blood culture negative so far. Decrease IV fluids. Continue IV rocephin/aztihromycin/flagyl  Discontinue C. difficile and enteric bacterial panel as the patient is not having any diarrhea since admission. Nebs, tessalon perls prn  On room air. Dysphagia, has a G-tube  - Yesterday discussed with GI in detail about the patient PEG tube. Patient correctly has a Mitchell 18 inch which was changed yesterday. Patient started having pain after feeding was resumed. - Discussed with GI again today. - Order CT abdomen/pelvis. - Holding tube feedings.  -Also Protonix added today.     Seizure disorder, not on seizure meds  CP, nonverbal at baseline  Pharmacy to help with med rec  Resume tube feedings today. Consulted dietitian    Updated the caregiver present at the bedside    25.0 - 29.9 Overweight / Body mass index is 25.84 kg/m². Estimated discharge date: May 12  Barriers: Clinical improvement, GI recommendations for PEG tube placement    Code status: Full  Prophylaxis: Lovenox  Recommended Disposition: Home w/Family     Subjective:     Chief Complaint / Reason for Physician Visit  Patient examined today, discussed with the  present at the bedside in detail about the patient PEG tube. Patient has no follow-up with any GI doctors for the last few months . D he iscussed with RN events overnight. Objective:     VITALS:   Last 24hrs VS reviewed since prior progress note.  Most recent are:  Patient Vitals for the past 24 hrs:   Temp Pulse Resp BP SpO2   05/15/22 0852 97.8 °F (36.6 °C) (!) 104 20 116/67 97 %   22 2311 99.6 °F (37.6 °C) 87 22 (!) 162/86 97 %   05/14/22 1940 -- -- -- -- 95 %   05/14/22 1859 98.1 °F (36.7 °C) 97 18 111/68 97 %   05/14/22 1502 97.8 °F (36.6 °C) 99 18 117/74 97 %       Intake/Output Summary (Last 24 hours) at 5/15/2022 1124  Last data filed at 5/14/2022 1940  Gross per 24 hour   Intake 120 ml   Output 0 ml   Net 120 ml        I had a face to face encounter and independently examined this patient on 5/15/2022, as outlined below:  PHYSICAL EXAM:  General: WD, WN. Awakw, cooperative, no acute distress    EENT:  EOMI. Anicteric sclerae. MMM  Resp:  Decreased breath sounds bilaterally, rhonchi lower lobes  CV:  Regular  rhythm,  No edema  GI:  Soft, Non distended, Non tender. +Bowel sounds  Neurologic:  Cerebral palsy patient, awake but not alert, normal speech,   Psych:   Good insight. Not anxious nor agitated  Skin:  No rashes. No jaundice    Reviewed most current lab test results and cultures  YES  Reviewed most current radiology test results   YES  Review and summation of old records today    NO  Reviewed patient's current orders and MAR    YES  PMH/SH reviewed - no change compared to H&P  ________________________________________________________________________  Care Plan discussed with:    Comments   Patient     Family  x  caregiver   RN x    Care Manager     Consultant  x GI                     Multidiciplinary team rounds were held today with , nursing, pharmacist and clinical coordinator. Patient's plan of care was discussed; medications were reviewed and discharge planning was addressed.      ________________________________________________________________________  Total NON critical care TIME: 36   minutes    Total CRITICAL CARE TIME Spent:   Minutes non procedure based      Comments   >50% of visit spent in counseling and coordination of care     ________________________________________________________________________  Jono Other, MD     Procedures: see electronic medical records for all procedures/Xrays and details which were not copied into this note but were reviewed prior to creation of Plan. LABS:  I reviewed today's most current labs and imaging studies. Pertinent labs include:  No results for input(s): WBC, HGB, HCT, PLT, HGBEXT, HCTEXT, PLTEXT, HGBEXT, HCTEXT, PLTEXT in the last 72 hours. No results for input(s): NA, K, CL, CO2, GLU, BUN, CREA, CA, MG, PHOS, ALB, TBIL, TBILI, ALT, INR, INREXT, INREXT in the last 72 hours.     No lab exists for component: SGOT    Signed: Yuval Parrish MD

## 2022-05-15 NOTE — PROGRESS NOTES
20:00  Received order from Dr. Omar Cardona to resume TF and Meds. CHARLA Gray restarted TF at 10ml/hr. 21:00 Pt had large loose bowel movement, pt has been frequently crying out and grimacing since TF was restarted. TF stopped scheduled Bentyl and clonazepam given via TF after pt was bathed. Aunt, April at bedside and agreeable with this RN's plan to resume TF after member is less agitated. Following message sent to ANGEL Kunz:      [facility] or Facility: [facility] From: [employer] RE: Mary Asa 1985 RM: 2136 Can pt have one time dose of 1mg IV Ativan? she is agitated and restless. Had to have a dose earlier today around 10am. Pt has missed most of her meds for the past 1.5days due to blocked peg tube. She had her bentyl and klonopin at 2122, but is still agitated. Need Callback: NO CALLBACK REQ    Read 10:22 PM  5/14/22 11:33 PM  Her aunt who is a nurse is with pt at bedside and feels that pt is having abd pain. Pt did get extremely agitated when TF was restarted, so TF stopped for now. I only restarted it at 10mL /hr.    Read 11:33 PM    Shift change report given to Nestor Santos (oncoming nurse) by Rommel Cisneros (offgoing nurse).  Report included the following information SBAR, Kardex, MAR, Recent Results and Cardiac Rhythm NSR-ST.

## 2022-05-15 NOTE — PROGRESS NOTES
Received notification from bedside RN about patient with regards to: persistent restlessness and agitation, missed most of her Klonopin doses earlier today secondary to PEG issues  VS: /68, HR 97, RR 18, O2 sat 07% on RA    Intervention given: Ativan 1 mg IV x 1 dose ordered    0115: Patient exhibiting signs of discomfort, family at bedside requesting medication to address pain  VS: /86, HR 87, RR 22, O2 sat 97% on RA    - Fentanyl 25 mcg IV x 1 dose ordered

## 2022-05-15 NOTE — PROGRESS NOTES
Pt scored 7/10 on adult non verbal pain scale and per patients family pt was showing normal signs of pain. Messaged NP salabao and ordered 25mcg of fentanyl. Pt npow resting comfortably.  Will pass along to day shift that pt is experiencing pain with tube feedings as well as at rest.

## 2022-05-15 NOTE — PROGRESS NOTES
Patient been on 10 ml feeding for a few hour but then started to scream uncontrollably and is very restless. MD aware. Will stop feeding and give 1 mg ativan.

## 2022-05-16 LAB
ANION GAP SERPL CALC-SCNC: 9 MMOL/L (ref 5–15)
BUN SERPL-MCNC: 10 MG/DL (ref 6–20)
BUN/CREAT SERPL: 22 (ref 12–20)
CALCIUM SERPL-MCNC: 9.2 MG/DL (ref 8.5–10.1)
CHLORIDE SERPL-SCNC: 112 MMOL/L (ref 97–108)
CO2 SERPL-SCNC: 18 MMOL/L (ref 21–32)
CREAT SERPL-MCNC: 0.46 MG/DL (ref 0.55–1.02)
ERYTHROCYTE [DISTWIDTH] IN BLOOD BY AUTOMATED COUNT: 17.8 % (ref 11.5–14.5)
GLUCOSE SERPL-MCNC: 78 MG/DL (ref 65–100)
HCT VFR BLD AUTO: 37.1 % (ref 35–47)
HGB BLD-MCNC: 11.7 G/DL (ref 11.5–16)
MCH RBC QN AUTO: 25.7 PG (ref 26–34)
MCHC RBC AUTO-ENTMCNC: 31.5 G/DL (ref 30–36.5)
MCV RBC AUTO: 81.5 FL (ref 80–99)
NRBC # BLD: 0 K/UL (ref 0–0.01)
NRBC BLD-RTO: 0 PER 100 WBC
PLATELET # BLD AUTO: 460 K/UL (ref 150–400)
PMV BLD AUTO: 10.1 FL (ref 8.9–12.9)
POTASSIUM SERPL-SCNC: 3.9 MMOL/L (ref 3.5–5.1)
RBC # BLD AUTO: 4.55 M/UL (ref 3.8–5.2)
SODIUM SERPL-SCNC: 139 MMOL/L (ref 136–145)
WBC # BLD AUTO: 5.8 K/UL (ref 3.6–11)

## 2022-05-16 PROCEDURE — 74011250637 HC RX REV CODE- 250/637: Performed by: INTERNAL MEDICINE

## 2022-05-16 PROCEDURE — 74011250636 HC RX REV CODE- 250/636: Performed by: SPECIALIST

## 2022-05-16 PROCEDURE — 74011000250 HC RX REV CODE- 250: Performed by: SPECIALIST

## 2022-05-16 PROCEDURE — 74011000250 HC RX REV CODE- 250: Performed by: INTERNAL MEDICINE

## 2022-05-16 PROCEDURE — C9113 INJ PANTOPRAZOLE SODIUM, VIA: HCPCS | Performed by: SPECIALIST

## 2022-05-16 PROCEDURE — 85027 COMPLETE CBC AUTOMATED: CPT

## 2022-05-16 PROCEDURE — 74011250636 HC RX REV CODE- 250/636: Performed by: INTERNAL MEDICINE

## 2022-05-16 PROCEDURE — 65270000046 HC RM TELEMETRY

## 2022-05-16 PROCEDURE — 36415 COLL VENOUS BLD VENIPUNCTURE: CPT

## 2022-05-16 PROCEDURE — 74011250636 HC RX REV CODE- 250/636: Performed by: STUDENT IN AN ORGANIZED HEALTH CARE EDUCATION/TRAINING PROGRAM

## 2022-05-16 PROCEDURE — 80048 BASIC METABOLIC PNL TOTAL CA: CPT

## 2022-05-16 RX ADMIN — DICYCLOMINE HYDROCHLORIDE 20 MG: 20 TABLET ORAL at 21:38

## 2022-05-16 RX ADMIN — DICYCLOMINE HYDROCHLORIDE 20 MG: 20 TABLET ORAL at 14:43

## 2022-05-16 RX ADMIN — CLONAZEPAM 1 MG: 1 TABLET ORAL at 12:32

## 2022-05-16 RX ADMIN — CLONAZEPAM 1 MG: 1 TABLET ORAL at 21:38

## 2022-05-16 RX ADMIN — TRAZODONE HYDROCHLORIDE 50 MG: 50 TABLET ORAL at 21:38

## 2022-05-16 RX ADMIN — SODIUM CHLORIDE, PRESERVATIVE FREE 10 ML: 5 INJECTION INTRAVENOUS at 14:55

## 2022-05-16 RX ADMIN — LORAZEPAM 1 MG: 2 INJECTION INTRAMUSCULAR; INTRAVENOUS at 01:10

## 2022-05-16 RX ADMIN — CLONAZEPAM 1 MG: 1 TABLET ORAL at 18:00

## 2022-05-16 RX ADMIN — METRONIDAZOLE 500 MG: 500 INJECTION, SOLUTION INTRAVENOUS at 09:26

## 2022-05-16 RX ADMIN — SODIUM CHLORIDE 40 MG: 9 INJECTION, SOLUTION INTRAMUSCULAR; INTRAVENOUS; SUBCUTANEOUS at 21:39

## 2022-05-16 RX ADMIN — SODIUM CHLORIDE 40 MG: 9 INJECTION, SOLUTION INTRAMUSCULAR; INTRAVENOUS; SUBCUTANEOUS at 09:24

## 2022-05-16 RX ADMIN — SODIUM CHLORIDE, PRESERVATIVE FREE 10 ML: 5 INJECTION INTRAVENOUS at 22:00

## 2022-05-16 RX ADMIN — SODIUM CHLORIDE, PRESERVATIVE FREE 10 ML: 5 INJECTION INTRAVENOUS at 05:57

## 2022-05-16 RX ADMIN — ACETAMINOPHEN 650 MG: 325 TABLET ORAL at 21:42

## 2022-05-16 RX ADMIN — ONDANSETRON 4 MG: 2 INJECTION INTRAMUSCULAR; INTRAVENOUS at 09:26

## 2022-05-16 NOTE — PROGRESS NOTES
Transition of Care Plan:     RUR: 13% - low   Disposition: Home with Aunt( Primary Caregiver)  Follow up appointments: PCP  DME needed: None identified at this time; Pt has a hospital bed, eidl lift, wheelchair, Tube feedings and supplies   Transportation at Discharge: [facility] to be arranged  Keys or means to access home:  Pt Aunt has access      IM Medicare Letter: provided   Is patient a BCPI-A Bundle:  Bundle letter will be distributed by unit CM if pt meets bundle criteria.                     If yes, was Bundle Letter given?:    Is patient a [place] and connected with the [facility]?   N/A        If yes, was [employer] transfer form completed and [facility] notified? Caregiver Contact: Aunt/Caregiver Nafisa Thapa [phone]  Discharge Caregiver contacted prior to discharge? Unit CM to follow up before discharge  Care Conference needed?: No    Initial Note: Chart reviewed. Pt discussed during IDR's. Pt still on floor pending medical stability. ELOISE is 5/17. CM to follow for dcp.        Anjel Blair MSW  Care Manager, [facility]

## 2022-05-16 NOTE — PROGRESS NOTES
Hospitalist Progress Note    NAME: Bobby Phillips   :  1985   MRN:  [id number]       Assessment / Plan:    Sepsis (Tm 103.4, tachycardia , RR35)  CAP  Hx of aspiration pna  Diarrhea   UTI POA  CXR showed low lung volumes with possible left lower lobe infiltrate  Rapid covid and influenza a/b neg  Respiratory panel negative. UA suggestive of UTI, culture shows mixed olman. Blood culture negative so far. Decrease IV fluids. Continue IV rocephin/aztihromycin/flagyl  Discontinue C. difficile and enteric bacterial panel as the patient is not having any diarrhea since admission. Nebs, tessalon perls prn  On room air. Dysphagia, has a G-tube  - Yesterday discussed with GI in detail about the patient PEG tube. Patient correctly has a Mitchell 18 inch which was changed yesterday. Patient started having pain after feeding was resumed last night. Feeding was stopped last night, resume again today if tolerated well then discharged tomorrow. - Discussed with GI again today.  - CT abdomen/pelvis with contrast- small to moderate paraesophageal hernia, enlarged fibroid uterus, chronic superior right hip dislocation  -Continue Protonix added today.     Seizure disorder, not on seizure meds  CP, nonverbal at baseline  Pharmacy to help with med rec  Resume tube feedings today. Consulted dietitian    Updated the caregiver present at the bedside    25.0 - 29.9 Overweight / Body mass index is 25.84 kg/m². Estimated discharge date: May 12  Barriers: Clinical improvement, GI recommendations for PEG tube placement    Code status: Full  Prophylaxis: Lovenox  Recommended Disposition: Home w/Family     Subjective:     Chief Complaint / Reason for Physician Visit  Patient seen today, doing fine so far not in pain at the moment. Discussed with the caregiver present at the bedside. Objective:     VITALS:   Last 24hrs VS reviewed since prior progress note.  Most recent are:  Patient Vitals for the past 24 hrs:   Temp Pulse Resp BP SpO2   05/16/22 1121 99.6 °F (37.6 °C) 96 18 -- 96 %   05/16/22 0754 98.6 °F (37 °C) 92 18 112/80 95 %   05/16/22 0455 98 °F (36.7 °C) 92 18 113/76 97 %   05/16/22 0102 98.2 °F (36.8 °C) (!) 110 20 (!) 142/83 96 %   05/15/22 2027 98.3 °F (36.8 °C) -- 19 -- 95 %   05/15/22 1500 98.5 °F (36.9 °C) (!) 102 19 115/79 99 %       Intake/Output Summary (Last 24 hours) at 5/16/2022 1346  Last data filed at 5/16/2022 0941  Gross per 24 hour   Intake 150 ml   Output --   Net 150 ml        I had a face to face encounter and independently examined this patient on 5/16/2022, as outlined below:  PHYSICAL EXAM:  General: WD, WN. Awakw, cooperative, no acute distress    EENT:  EOMI. Anicteric sclerae. MMM  Resp:  Decreased breath sounds bilaterally, rhonchi lower lobes  CV:  Regular  rhythm,  No edema  GI:  Soft, Non distended, Non tender. +Bowel sounds  Neurologic:  Cerebral palsy patient, awake but not alert, normal speech,   Psych:   Good insight. Not anxious nor agitated  Skin:  No rashes. No jaundice    Reviewed most current lab test results and cultures  YES  Reviewed most current radiology test results   YES  Review and summation of old records today    NO  Reviewed patient's current orders and MAR    YES  PMH/SH reviewed - no change compared to H&P  ________________________________________________________________________  Care Plan discussed with:    Comments   Patient     Family  x  caregiver   RN x    Care Manager     Consultant  x GI                     Multidiciplinary team rounds were held today with , nursing, pharmacist and clinical coordinator. Patient's plan of care was discussed; medications were reviewed and discharge planning was addressed.      ________________________________________________________________________  Total NON critical care TIME: 36   minutes    Total CRITICAL CARE TIME Spent:   Minutes non procedure based      Comments   >50% of visit spent in counseling and coordination of care     ________________________________________________________________________  Everton Perez MD     Procedures: see electronic medical records for all procedures/Xrays and details which were not copied into this note but were reviewed prior to creation of Plan. LABS:  I reviewed today's most current labs and imaging studies.   Pertinent labs include:  Recent Labs     05/16/22  0508   WBC 5.8   HGB 11.7   HCT 37.1   *     Recent Labs     05/16/22  0508      K 3.9   *   CO2 18*   GLU 78   BUN 10   CREA 0.46*   CA 9.2       Signed: Everton Perez MD

## 2022-05-16 NOTE — PROGRESS NOTES
Tube feed rate increased to 20ml/hr. Patient seems to be tolerating feeds well. Will notify nightshift RN to increase another 10ml/hr at midnight.

## 2022-05-17 ENCOUNTER — APPOINTMENT (OUTPATIENT)
Dept: INTERVENTIONAL RADIOLOGY/VASCULAR | Age: 37
DRG: 871 | End: 2022-05-17
Attending: STUDENT IN AN ORGANIZED HEALTH CARE EDUCATION/TRAINING PROGRAM
Payer: MEDICARE

## 2022-05-17 ENCOUNTER — HOME HEALTH ADMISSION (OUTPATIENT)
Dept: HOME HEALTH SERVICES | Facility: HOME HEALTH | Age: 37
End: 2022-05-17

## 2022-05-17 VITALS
DIASTOLIC BLOOD PRESSURE: 82 MMHG | WEIGHT: 165 LBS | RESPIRATION RATE: 20 BRPM | HEIGHT: 67 IN | HEART RATE: 104 BPM | TEMPERATURE: 99 F | SYSTOLIC BLOOD PRESSURE: 100 MMHG | OXYGEN SATURATION: 100 % | BODY MASS INDEX: 25.9 KG/M2

## 2022-05-17 PROCEDURE — 2709999900 HC NON-CHARGEABLE SUPPLY

## 2022-05-17 PROCEDURE — C1769 GUIDE WIRE: HCPCS

## 2022-05-17 PROCEDURE — 74011000636 HC RX REV CODE- 636: Performed by: RADIOLOGY

## 2022-05-17 PROCEDURE — 74011250636 HC RX REV CODE- 250/636: Performed by: RADIOLOGY

## 2022-05-17 PROCEDURE — 74011000250 HC RX REV CODE- 250: Performed by: INTERNAL MEDICINE

## 2022-05-17 PROCEDURE — 43763 RPLC GTUBE REVJ GSTRST TRC: CPT

## 2022-05-17 PROCEDURE — 74011250636 HC RX REV CODE- 250/636: Performed by: SPECIALIST

## 2022-05-17 PROCEDURE — 74011000250 HC RX REV CODE- 250: Performed by: SPECIALIST

## 2022-05-17 PROCEDURE — C9113 INJ PANTOPRAZOLE SODIUM, VIA: HCPCS | Performed by: SPECIALIST

## 2022-05-17 PROCEDURE — 77030018617 HC TU FEED GASTMY1 COOK -B

## 2022-05-17 PROCEDURE — 0D20XUZ CHANGE FEEDING DEVICE IN UPPER INTESTINAL TRACT, EXTERNAL APPROACH: ICD-10-PCS | Performed by: RADIOLOGY

## 2022-05-17 PROCEDURE — 74011250637 HC RX REV CODE- 250/637: Performed by: INTERNAL MEDICINE

## 2022-05-17 PROCEDURE — 74011250636 HC RX REV CODE- 250/636: Performed by: INTERNAL MEDICINE

## 2022-05-17 PROCEDURE — 49450 REPLACE G/C TUBE PERC: CPT

## 2022-05-17 RX ORDER — HEPARIN SODIUM 200 [USP'U]/100ML
400 INJECTION, SOLUTION INTRAVENOUS ONCE
Status: COMPLETED | OUTPATIENT
Start: 2022-05-17 | End: 2022-05-17

## 2022-05-17 RX ORDER — LIDOCAINE HYDROCHLORIDE 20 MG/ML
2 JELLY TOPICAL ONCE
Status: DISCONTINUED | OUTPATIENT
Start: 2022-05-17 | End: 2022-05-17 | Stop reason: HOSPADM

## 2022-05-17 RX ORDER — AMOXICILLIN AND CLAVULANATE POTASSIUM 875; 125 MG/1; MG/1
1 TABLET, FILM COATED ORAL EVERY 12 HOURS
Qty: 10 TABLET | Refills: 0 | Status: SHIPPED | OUTPATIENT
Start: 2022-05-17 | End: 2022-05-22

## 2022-05-17 RX ORDER — PANTOPRAZOLE SODIUM 40 MG/1
40 TABLET, DELAYED RELEASE ORAL DAILY
Qty: 30 TABLET | Refills: 0 | Status: SHIPPED | OUTPATIENT
Start: 2022-05-17 | End: 2022-06-16

## 2022-05-17 RX ADMIN — IOPAMIDOL 10 ML: 755 INJECTION, SOLUTION INTRAVENOUS at 13:00

## 2022-05-17 RX ADMIN — SODIUM CHLORIDE, PRESERVATIVE FREE 10 ML: 5 INJECTION INTRAVENOUS at 15:28

## 2022-05-17 RX ADMIN — DICYCLOMINE HYDROCHLORIDE 20 MG: 20 TABLET ORAL at 15:28

## 2022-05-17 RX ADMIN — ACETAMINOPHEN 650 MG: 325 TABLET ORAL at 17:52

## 2022-05-17 RX ADMIN — SODIUM CHLORIDE 40 MG: 9 INJECTION, SOLUTION INTRAMUSCULAR; INTRAVENOUS; SUBCUTANEOUS at 09:29

## 2022-05-17 RX ADMIN — DICYCLOMINE HYDROCHLORIDE 20 MG: 20 TABLET ORAL at 09:29

## 2022-05-17 RX ADMIN — HEPARIN SODIUM IN SODIUM CHLORIDE 50 ML: 200 INJECTION INTRAVENOUS at 15:08

## 2022-05-17 RX ADMIN — CLONAZEPAM 1 MG: 1 TABLET ORAL at 09:29

## 2022-05-17 RX ADMIN — DICYCLOMINE HYDROCHLORIDE 20 MG: 20 TABLET ORAL at 03:00

## 2022-05-17 RX ADMIN — SODIUM CHLORIDE, PRESERVATIVE FREE 10 ML: 5 INJECTION INTRAVENOUS at 06:47

## 2022-05-17 RX ADMIN — CLONAZEPAM 1 MG: 1 TABLET ORAL at 12:15

## 2022-05-17 RX ADMIN — CLONAZEPAM 1 MG: 1 TABLET ORAL at 17:43

## 2022-05-17 RX ADMIN — ENOXAPARIN SODIUM 40 MG: 100 INJECTION SUBCUTANEOUS at 09:29

## 2022-05-17 NOTE — PROGRESS NOTES
Patient brought to IR recovery at this time from [facility]. Patient is non verbal at baseline, on RA, and is in NAD at this time. Call bell is within reach, bed locked, and lowered at this time. Patient awaiting to be consented for ordered procedure at this time. Patients PIV in R hand does not appear to be working at this time. Will pause PIV continuous fluids at this time during procedure.

## 2022-05-17 NOTE — DISCHARGE SUMMARY
Hospitalist Discharge Summary     Patient ID:  Cornelia Garrett  [id number]  31 y.o.  1985  5/9/2022    PCP on record: Brooks Alvarez MD    Admit date: 5/9/2022  Discharge date and time: 5/17/2022    DISCHARGE DIAGNOSIS:    Sepsis  Community-acquired pneumonia  Diarrhea  UTI-POA  Dysphagia-PEG tube, PEG tube replaced  Seizure disorder  Cerebral palsy-nonverbal at baseline    CONSULTATIONS:  IP CONSULT TO HOSPITALIST  IP CONSULT TO GASTROENTEROLOGY  IP CONSULT TO INTERVENTIONAL RADIOLOGY    Excerpted HPI from H&P of Sadi Hammond MD:  Cornelia Garrett is a 31 y.o.  female with PMHx significant for CP, nonverbal at baseline, has a G tube, seizure disorder presents to the ER for evaluation of poor appetite and po intake. Her aunt noticed increased work of breathing but denies any fever or cough. In the ER, pt is febrile with Tm 103.2 and pna was seen on CXR. She was given empiric IV abx. Vitals/labs/imaging reviewed. ______________________________________________________________________  DISCHARGE SUMMARY/HOSPITAL COURSE:  for full details see H&P, daily progress notes, labs, consult notes. Sepsis (Tm 103.4, tachycardia , RR35)  CAP  Hx of aspiration pna  Diarrhea   UTI POA  CXR showed low lung volumes with possible left lower lobe infiltrate  Rapid covid and influenza a/b neg  Respiratory panel negative. UA suggestive of UTI, culture shows mixed olman. Blood culture negative so far. Decrease IV fluids. Continue IV rocephin/aztihromycin/flagyl  Discontinue C. difficile and enteric bacterial panel as the patient is not having any diarrhea since admission. Nebs, tessalon perls prn  On room air.     Dysphagia, has a G-tube  - Yesterday discussed with GI in detail about the patient PEG tube. Patient correctly has a Mitchell 18 inch which was changed yesterday. Patient started having pain after feeding was resumed last night. Patient tolerated feeding well.   Discussed with the IR-Dr. Cherylynn Lombard who going to replace the PEG tube. After replacement patient will be discharged. -  GI on board-appreciate input.  - CT abdomen/pelvis with contrast- small to moderate paraesophageal hernia, enlarged fibroid uterus, chronic superior right hip dislocation  -Discharged on Protonix.     Seizure disorder, not on seizure meds  CP, nonverbal at baseline  Pharmacy to help with med rec  Resume tube feedings today. Consulted dietitian   _____________________________________________________________  Patient seen and examined by me on discharge day. Pertinent Findings:  Gen:    Not in distress  Chest: Clear lungs  CVS:   Regular rhythm. No edema  Abd:  Soft, not distended, not tender  Neuro:  Alert,   _______________________________________________________________________  DISCHARGE MEDICATIONS:   Current Discharge Medication List      START taking these medications    Details   guaiFENesin ER (Mucinex) 600 mg ER tablet Take 1 Tablet by mouth two (2) times a day for 10 days. Qty: 20 Tablet, Refills: 0  Start date: 5/13/2022, End date: 5/23/2022         CONTINUE these medications which have CHANGED    Details   traZODone (DESYREL) 50 mg tablet Take 1 Tablet by mouth nightly for 30 days. At 0330  Qty: 30 Tablet, Refills: 0  Start date: 5/13/2022, End date: 6/12/2022         CONTINUE these medications which have NOT CHANGED    Details   dicyclomine (BENTYL) 20 mg tablet Take 1 Tablet by mouth every six (6) hours. Qty: 20 Tablet, Refills: 0      clonazePAM (KlonoPIN) 1 mg tablet Take 1 mg by mouth four (4) times daily. DISSOLVE ONE TABLET BY MOUTH IN THE AFTERNOON AND 3 TABLETS EVERY NIGHT AT BEDTIME FOR INSOMNIA      melatonin 1 mg tablet Take 2 mg by mouth nightly. ibuprofen (MOTRIN) 600 mg tablet Take 600 mg by mouth every six (6) hours as needed for Pain (for menstrual cramping). baclofen (LIORESAL) 20 mg tablet Take 1 mg by mouth four (4) times daily.                Patient Follow Up Instructions: Activity: Activity as tolerated  Diet: PEG feeding as directed  Wound Care: None needed      Follow-up Information     Follow up With Specialties Details Why Contact Info    Gilmer Giraldo NP Nurse Practitioner Go on 5/20/2022 at 11:30am for your PCP hospital follow up. Dr. Crystal Nguyen did not have any availabilty. Please arrive 20 minutes early, bring photo ID, insurance cards, copay, medication bottles and any completed forms.  [facility]  [facility], Heather Chiu MD Family Medicine   [address]      Jennifer Bueno MD Gastroenterology In 2 weeks  [address] [phone]          ________________________________________________________________    Risk of deterioration: Moderate    Condition at Discharge:  Stable  __________________________________________________________________    Disposition  Home with family and home health services    ____________________________________________________________________    Code Status: Full Code  ___________________________________________________________________      Total time in minutes spent coordinating this discharge (includes going over instructions, follow-up, prescriptions, and preparing report for sign off to her PCP) :  >30 minutes    Signed:  Sam Jones MD

## 2022-05-17 NOTE — PROGRESS NOTES
TRANSFER - OUT REPORT:    Verbal report given to Esperanza Fox RN  (name) on Edel Kill  being transferred to [facility](unit) for routine progression of care       Report consisted of patients Situation, Background, Assessment and   Recommendations(SBAR). Information from the following report(s) Procedure Summary was reviewed with the receiving nurse. Lines:   Peripheral IV 05/15/22 Right Hand (Active)   Site Assessment Clean, dry, & intact 05/17/22 0927   Phlebitis Assessment 0 05/17/22 0927   Infiltration Assessment 0 05/17/22 0927   Dressing Status Clean, dry, & intact 05/17/22 0927   Dressing Type Transparent 05/17/22 0927   Hub Color/Line Status Blue;Flushed 05/17/22 7281   Action Taken Open ports on tubing capped 05/17/22 0927   Alcohol Cap Used Yes 05/17/22 4441        Opportunity for questions and clarification was provided. receiving RN aware feeding tube is in good position to use. There were no medications given by IR RN at this time.  G tube is in good position and verified by MD Annie Hurley proceduralist .

## 2022-05-17 NOTE — PROGRESS NOTES
Per nightshift RN, tube feeding increased at midnight was missed. Tube feeding increased to 30ml/hr at 0700.

## 2022-05-17 NOTE — PROGRESS NOTES
Pharmacist Discharge Medication Reconciliation    Significant PMH:   Past Medical History:   Diagnosis Date    Aspiration into respiratory tract     Asthma     Cerebral palsy ([facility] [address]     Seizure ([facility] [address]     Skull fractures ([facility] [address]     @ birth     Encounter Diagnoses:   Encounter Diagnoses   Name Primary? Sepsis without acute organ dysfunction, due to unspecified organism [facility]) Yes    Community acquired pneumonia of left lower lobe of lung     Febrile illness, acute      Allergies: Patient has no known allergies. Discharge Medications:   Current Discharge Medication List        START taking these medications    Details   guaiFENesin ER (Mucinex) 600 mg ER tablet Take 1 Tablet by mouth two (2) times a day for 10 days. Qty: 20 Tablet, Refills: 0  Start date: 5/13/2022, End date: 5/23/2022           CONTINUE these medications which have CHANGED    Details   traZODone (DESYREL) 50 mg tablet Take 1 Tablet by mouth nightly for 30 days. At 0330  Qty: 30 Tablet, Refills: 0  Start date: 5/13/2022, End date: 6/12/2022           CONTINUE these medications which have NOT CHANGED    Details   dicyclomine (BENTYL) 20 mg tablet Take 1 Tablet by mouth every six (6) hours. Qty: 20 Tablet, Refills: 0      clonazePAM (KlonoPIN) 1 mg tablet Take 1 mg by mouth four (4) times daily. DISSOLVE ONE TABLET BY MOUTH IN THE AFTERNOON AND 3 TABLETS EVERY NIGHT AT BEDTIME FOR INSOMNIA      melatonin 1 mg tablet Take 2 mg by mouth nightly. ibuprofen (MOTRIN) 600 mg tablet Take 600 mg by mouth every six (6) hours as needed for Pain (for menstrual cramping). baclofen (LIORESAL) 20 mg tablet Take 1 mg by mouth four (4) times daily. The patient's chart, MAR and AVS were reviewed by Adriel Ramos [facility].     Discharging Provider: Marcia Brandon MD    Thank you,     LYNDON Medeiros [facility]

## 2022-05-25 ENCOUNTER — APPOINTMENT (OUTPATIENT)
Dept: CT IMAGING | Age: 37
End: 2022-05-25
Attending: EMERGENCY MEDICINE
Payer: MEDICARE

## 2022-05-25 ENCOUNTER — APPOINTMENT (OUTPATIENT)
Dept: GENERAL RADIOLOGY | Age: 37
End: 2022-05-25
Attending: EMERGENCY MEDICINE
Payer: MEDICARE

## 2022-05-25 ENCOUNTER — HOSPITAL ENCOUNTER (EMERGENCY)
Age: 37
Discharge: HOME | End: 2022-05-25
Attending: EMERGENCY MEDICINE
Payer: MEDICARE

## 2022-05-25 VITALS
OXYGEN SATURATION: 99 % | DIASTOLIC BLOOD PRESSURE: 96 MMHG | SYSTOLIC BLOOD PRESSURE: 112 MMHG | RESPIRATION RATE: 20 BRPM | WEIGHT: 165 LBS | TEMPERATURE: 97.9 F | HEART RATE: 80 BPM | BODY MASS INDEX: 25.84 KG/M2

## 2022-05-25 DIAGNOSIS — K02.9 PAIN DUE TO DENTAL CARIES: Primary | ICD-10-CM

## 2022-05-25 DIAGNOSIS — R14.0 ABDOMINAL DISTENTION: ICD-10-CM

## 2022-05-25 DIAGNOSIS — D25.9 UTERINE LEIOMYOMA, UNSPECIFIED LOCATION: ICD-10-CM

## 2022-05-25 LAB
ALBUMIN SERPL-MCNC: 2.9 G/DL (ref 3.5–5)
ALBUMIN/GLOB SERPL: 0.7 {RATIO} (ref 1.1–2.2)
ALP SERPL-CCNC: 56 U/L (ref 45–117)
ALT SERPL-CCNC: 39 U/L (ref 12–78)
ANION GAP SERPL CALC-SCNC: 4 MMOL/L (ref 5–15)
APPEARANCE UR: CLEAR
AST SERPL-CCNC: 42 U/L (ref 15–37)
BACTERIA URNS QL MICRO: NEGATIVE /HPF
BASOPHILS # BLD: 0 K/UL (ref 0–0.1)
BASOPHILS NFR BLD: 1 % (ref 0–1)
BILIRUB SERPL-MCNC: 0.5 MG/DL (ref 0.2–1)
BILIRUB UR QL: NEGATIVE
BUN SERPL-MCNC: 6 MG/DL (ref 6–20)
BUN/CREAT SERPL: 15 (ref 12–20)
CALCIUM SERPL-MCNC: 9.1 MG/DL (ref 8.5–10.1)
CHLORIDE SERPL-SCNC: 107 MMOL/L (ref 97–108)
CO2 SERPL-SCNC: 27 MMOL/L (ref 21–32)
COLOR UR: ABNORMAL
CREAT SERPL-MCNC: 0.39 MG/DL (ref 0.55–1.02)
DIFFERENTIAL METHOD BLD: ABNORMAL
EOSINOPHIL # BLD: 0.1 K/UL (ref 0–0.4)
EOSINOPHIL NFR BLD: 2 % (ref 0–7)
EPITH CASTS URNS QL MICRO: ABNORMAL /LPF
ERYTHROCYTE [DISTWIDTH] IN BLOOD BY AUTOMATED COUNT: 17.3 % (ref 11.5–14.5)
GLOBULIN SER CALC-MCNC: 4.4 G/DL (ref 2–4)
GLUCOSE SERPL-MCNC: 90 MG/DL (ref 65–100)
GLUCOSE UR STRIP.AUTO-MCNC: NEGATIVE MG/DL
HCG UR QL: NEGATIVE
HCT VFR BLD AUTO: 33.6 % (ref 35–47)
HGB BLD-MCNC: 10.4 G/DL (ref 11.5–16)
HGB UR QL STRIP: NEGATIVE
HYALINE CASTS URNS QL MICRO: ABNORMAL /LPF (ref 0–2)
IMM GRANULOCYTES # BLD AUTO: 0 K/UL (ref 0–0.04)
IMM GRANULOCYTES NFR BLD AUTO: 0 % (ref 0–0.5)
KETONES UR QL STRIP.AUTO: NEGATIVE MG/DL
LEUKOCYTE ESTERASE UR QL STRIP.AUTO: ABNORMAL
LYMPHOCYTES # BLD: 1.7 K/UL (ref 0.8–3.5)
LYMPHOCYTES NFR BLD: 41 % (ref 12–49)
MCH RBC QN AUTO: 25 PG (ref 26–34)
MCHC RBC AUTO-ENTMCNC: 31 G/DL (ref 30–36.5)
MCV RBC AUTO: 80.8 FL (ref 80–99)
MONOCYTES # BLD: 0.6 K/UL (ref 0–1)
MONOCYTES NFR BLD: 14 % (ref 5–13)
NEUTS SEG # BLD: 1.7 K/UL (ref 1.8–8)
NEUTS SEG NFR BLD: 42 % (ref 32–75)
NITRITE UR QL STRIP.AUTO: NEGATIVE
NRBC # BLD: 0 K/UL (ref 0–0.01)
NRBC BLD-RTO: 0 PER 100 WBC
PH UR STRIP: 8 [PH] (ref 5–8)
PLATELET # BLD AUTO: 401 K/UL (ref 150–400)
PMV BLD AUTO: 11.1 FL (ref 8.9–12.9)
POTASSIUM SERPL-SCNC: 3.6 MMOL/L (ref 3.5–5.1)
PROT SERPL-MCNC: 7.3 G/DL (ref 6.4–8.2)
PROT UR STRIP-MCNC: ABNORMAL MG/DL
RBC # BLD AUTO: 4.16 M/UL (ref 3.8–5.2)
RBC #/AREA URNS HPF: ABNORMAL /HPF (ref 0–5)
SODIUM SERPL-SCNC: 138 MMOL/L (ref 136–145)
SP GR UR REFRACTOMETRY: 1.01
UA: UC IF INDICATED,UAUC: ABNORMAL
UROBILINOGEN UR QL STRIP.AUTO: 0.2 EU/DL (ref 0.2–1)
WBC # BLD AUTO: 4.2 K/UL (ref 3.6–11)
WBC URNS QL MICRO: ABNORMAL /HPF (ref 0–4)

## 2022-05-25 PROCEDURE — 81025 URINE PREGNANCY TEST: CPT

## 2022-05-25 PROCEDURE — 96374 THER/PROPH/DIAG INJ IV PUSH: CPT

## 2022-05-25 PROCEDURE — 99285 EMERGENCY DEPT VISIT HI MDM: CPT

## 2022-05-25 PROCEDURE — 36415 COLL VENOUS BLD VENIPUNCTURE: CPT

## 2022-05-25 PROCEDURE — 80053 COMPREHEN METABOLIC PANEL: CPT

## 2022-05-25 PROCEDURE — 74022 RADEX COMPL AQT ABD SERIES: CPT

## 2022-05-25 PROCEDURE — 74011250636 HC RX REV CODE- 250/636: Performed by: EMERGENCY MEDICINE

## 2022-05-25 PROCEDURE — 96375 TX/PRO/DX INJ NEW DRUG ADDON: CPT

## 2022-05-25 PROCEDURE — 74177 CT ABD & PELVIS W/CONTRAST: CPT

## 2022-05-25 PROCEDURE — 85025 COMPLETE CBC W/AUTO DIFF WBC: CPT

## 2022-05-25 PROCEDURE — 81001 URINALYSIS AUTO W/SCOPE: CPT

## 2022-05-25 PROCEDURE — 74011000636 HC RX REV CODE- 636: Performed by: EMERGENCY MEDICINE

## 2022-05-25 RX ORDER — POLYETHYLENE GLYCOL 3350 17 G/17G
17 POWDER, FOR SOLUTION ORAL DAILY
Qty: 289 G | Refills: 0 | Status: SHIPPED | OUTPATIENT
Start: 2022-05-25

## 2022-05-25 RX ORDER — DICYCLOMINE HYDROCHLORIDE 20 MG/1
20 TABLET ORAL EVERY 6 HOURS
Qty: 20 TABLET | Refills: 0 | Status: SHIPPED | OUTPATIENT
Start: 2022-05-25

## 2022-05-25 RX ORDER — FENTANYL CITRATE 50 UG/ML
25 INJECTION, SOLUTION INTRAMUSCULAR; INTRAVENOUS
Status: COMPLETED | OUTPATIENT
Start: 2022-05-25 | End: 2022-05-25

## 2022-05-25 RX ORDER — LORAZEPAM 2 MG/ML
0.5 INJECTION INTRAMUSCULAR
Status: COMPLETED | OUTPATIENT
Start: 2022-05-25 | End: 2022-05-25

## 2022-05-25 RX ORDER — POLYETHYLENE GLYCOL 3350 17 G/17G
17 POWDER, FOR SOLUTION ORAL DAILY
Status: DISCONTINUED | OUTPATIENT
Start: 2022-05-25 | End: 2022-05-25

## 2022-05-25 RX ADMIN — FENTANYL CITRATE 25 MCG: 50 INJECTION, SOLUTION INTRAMUSCULAR; INTRAVENOUS at 06:30

## 2022-05-25 RX ADMIN — IOPAMIDOL 100 ML: 755 INJECTION, SOLUTION INTRAVENOUS at 07:00

## 2022-05-25 RX ADMIN — LORAZEPAM 0.5 MG: 2 INJECTION INTRAMUSCULAR; INTRAVENOUS at 06:28

## 2022-05-25 NOTE — ED TRIAGE NOTES
Pt arrives via ems/bls for c/o by her aunt/caregiver at bedside that there was \"blood\" on the dressing around the peg tube area that was just replaced.

## 2022-05-25 NOTE — ED PROVIDER NOTES
EMERGENCY DEPARTMENT HISTORY AND PHYSICAL EXAM      Date: 5/25/2022  Patient Name: Hali Daniel    History of Presenting Illness     Chief Complaint   Patient presents with    Feeding Tube Problem       History Provided By: Caregiver    HPI: Hali Daniel, 31 y.o. female with PMHx significant for cerebral palsy, seizure disorder, skull fractures at birth, chronic right hip dislocation who is nonverbal presents with caretaker (aunt Branden Hollis) for possible abdominal pain. And possible dental pain. Caretaker reports that patient woke up in the middle of the night and was screaming. She gave patient Tylenol but there was no change in patient's condition. Caretaker reports that within the past 2 days, she noted that there was a piece of tooth in patient's mouth. States that she thinks that patient's left upper wisdom tooth cracked. Caretaker pulled the piece of tooth out of patient's mouth. She thinks that might be hurting patient. Patient also was recently admitted for sepsis/community-acquired pneumonia and UTI. She was treated with antibiotics and discharged on May 17. Caretaker reports patient has not had any increased work of breathing or fever since discharge. She did have her PEG tube replaced while she was in the hospital and caretaker reports there has been a small amount of crusty bleeding at the insertion site. No blood from the lumen. Caretaker is concerned that this might also be causing patient pain. Patient has not had any coughing or vomiting. PCP: Shreya Renee MD    No current facility-administered medications on file prior to encounter. Current Outpatient Medications on File Prior to Encounter   Medication Sig Dispense Refill    pantoprazole (Protonix) 40 mg tablet Take 1 Tablet by mouth daily for 30 days. 30 Tablet 0    traZODone (DESYREL) 50 mg tablet Take 1 Tablet by mouth nightly for 30 days.  At 0330 30 Tablet 0    dicyclomine (BENTYL) 20 mg tablet Take 1 Tablet by mouth every six (6) hours. 20 Tablet 0    clonazePAM (KlonoPIN) 1 mg tablet Take 1 mg by mouth four (4) times daily. DISSOLVE ONE TABLET BY MOUTH IN THE AFTERNOON AND 3 TABLETS EVERY NIGHT AT BEDTIME FOR INSOMNIA      melatonin 1 mg tablet Take 2 mg by mouth nightly.  baclofen (LIORESAL) 20 mg tablet Take 1 mg by mouth four (4) times daily.          Past History     Past Medical History:  Past Medical History:   Diagnosis Date    Aspiration into respiratory tract     Asthma     Cerebral palsy ([facility] [address]     Seizure ([facility] [address]     Skull fractures ([facility] [address]     @ birth       Past Surgical History:  Past Surgical History:   Procedure Laterality Date    HX GI      feeding tube; nipson to help prevent asipration    IR REPLACE GASTRO/CECOSTOMY TUBE PERC  5/17/2022    IR REPLACE GASTRO/JEJUNO TUBE PERC  9/10/2021       Family History:  Family History   Problem Relation Age of Onset    No Known Problems Mother     No Known Problems Father        Social History:  Social History     Tobacco Use    Smoking status: Never Smoker    Smokeless tobacco: Never Used   Substance Use Topics    Alcohol use: No    Drug use: No       Allergies:  No Known Allergies      Review of Systems   Review of Systems   Unable to perform ROS: Patient nonverbal         Physical Exam    General appearance - well nourished, nonverbal, patient is intermittently wailing   eyes - pupils equal and reactive, extraocular eye movements intact  ENT - mucous membranes moist, pharynx normal without lesions, poor dentition on visible teeth patient is unable to cooperate with full dental exam, especially posterior molars,  Neck - supple, no significant adenopathy; non-tender to palpation  Chest - clear to auscultation, no wheezes, rales or rhonchi; non-tender to palpation  Heart - normal rate and regular rhythm, S1 and S2 normal, no murmurs noted  Abdomen - soft, seems to be tender as patient grimaces and alia with palpation of abdomen, lower abdomen is distended, mass palpable in lower abdomen, G-tube present  Musculoskeletal - no joint tenderness, deformity or swelling; normal ROM  Extremities - peripheral pulses normal, no pedal edema  Skin - normal coloration and turgor, no rashes  Neurological - alert, oriented x3, normal speech, no focal findings or movement disorder noted    Diagnostic Study Results     Labs -     Recent Results (from the past 12 hour(s))   CBC WITH AUTOMATED DIFF    Collection Time: 05/25/22  4:32 AM   Result Value Ref Range    WBC 4.2 3.6 - 11.0 K/uL    RBC 4.16 3.80 - 5.20 M/uL    HGB 10.4 (L) 11.5 - 16.0 g/dL    HCT 33.6 (L) 35.0 - 47.0 %    MCV 80.8 80.0 - 99.0 FL    MCH 25.0 (L) 26.0 - 34.0 PG    MCHC 31.0 30.0 - 36.5 g/dL    RDW 17.3 (H) 11.5 - 14.5 %    PLATELET 859 (H) 838 - 400 K/uL    MPV 11.1 8.9 - 12.9 FL    NRBC 0.0 0  WBC    ABSOLUTE NRBC 0.00 0.00 - 0.01 K/uL    NEUTROPHILS 42 32 - 75 %    LYMPHOCYTES 41 12 - 49 %    MONOCYTES 14 (H) 5 - 13 %    EOSINOPHILS 2 0 - 7 %    BASOPHILS 1 0 - 1 %    IMMATURE GRANULOCYTES 0 0.0 - 0.5 %    ABS. NEUTROPHILS 1.7 (L) 1.8 - 8.0 K/UL    ABS. LYMPHOCYTES 1.7 0.8 - 3.5 K/UL    ABS. MONOCYTES 0.6 0.0 - 1.0 K/UL    ABS. EOSINOPHILS 0.1 0.0 - 0.4 K/UL    ABS. BASOPHILS 0.0 0.0 - 0.1 K/UL    ABS. IMM. GRANS. 0.0 0.00 - 0.04 K/UL    DF AUTOMATED     METABOLIC PANEL, COMPREHENSIVE    Collection Time: 05/25/22  4:32 AM   Result Value Ref Range    Sodium 138 136 - 145 mmol/L    Potassium 3.6 3.5 - 5.1 mmol/L    Chloride 107 97 - 108 mmol/L    CO2 27 21 - 32 mmol/L    Anion gap 4 (L) 5 - 15 mmol/L    Glucose 90 65 - 100 mg/dL    BUN 6 6 - 20 MG/DL    Creatinine 0.39 (L) 0.55 - 1.02 MG/DL    BUN/Creatinine ratio 15 12 - 20      GFR est AA >60 >60 ml/min/1.73m2    GFR est non-AA >60 >60 ml/min/1.73m2    Calcium 9.1 8.5 - 10.1 MG/DL    Bilirubin, total 0.5 0.2 - 1.0 MG/DL    ALT (SGPT) 39 12 - 78 U/L    AST (SGOT) 42 (H) 15 - 37 U/L    Alk.  phosphatase 56 45 - 117 U/L    Protein, total 7.3 6.4 - 8.2 g/dL    Albumin 2.9 (L) 3.5 - 5.0 g/dL    Globulin 4.4 (H) 2.0 - 4.0 g/dL    A-G Ratio 0.7 (L) 1.1 - 2.2     URINALYSIS W/ REFLEX CULTURE    Collection Time: 05/25/22  5:25 AM    Specimen: Urine   Result Value Ref Range    Color YELLOW/STRAW      Appearance CLEAR CLEAR      Specific gravity 1.007      pH (UA) 8.0 5.0 - 8.0      Protein TRACE (A) NEG mg/dL    Glucose Negative NEG mg/dL    Ketone Negative NEG mg/dL    Bilirubin Negative NEG      Blood Negative NEG      Urobilinogen 0.2 0.2 - 1.0 EU/dL    Nitrites Negative NEG      Leukocyte Esterase LARGE (A) NEG      UA:UC IF INDICATED CULTURE NOT INDICATED BY UA RESULT CNI      WBC 5-10 0 - 4 /hpf    RBC 0-5 0 - 5 /hpf    Epithelial cells FEW FEW /lpf    Bacteria Negative NEG /hpf    Hyaline cast 0-2 0 - 2 /lpf   HCG URINE, QL. - POC    Collection Time: 05/25/22  6:30 AM   Result Value Ref Range    Pregnancy test,urine (POC) Negative NEG         Radiologic Studies -   CT ABD PELV W CONT   Final Result   No acute intraperitoneal process is identified. Very large fibroid uterus. Please see above for additional nonemergent incidental findings. XR ABD ACUTE W 1 V CHEST   Final Result   No acute process identified. CT Results  (Last 48 hours)               05/25/22 0700  CT ABD PELV W CONT Final result    Impression:  No acute intraperitoneal process is identified. Very large fibroid uterus. Please see above for additional nonemergent incidental findings. Narrative:      CLINICAL HISTORY: abd pain, distention, nonverbal, recent feeding tube   replacement   INDICATION: abd pain, distention, nonverbal, recent feeding tube replacement   COMPARISON: 5/15/2022. CONTRAST: 100  ml Isovue 370   TECHNIQUE:    Multislice helical CT was performed of the abdomen and pelvis following   uneventful rapid bolus intravenous contrast administration. Oral contrast was   not administered.  Contiguous 5 mm axial images were reconstructed and lung and   soft tissue windows were generated. Coronal and sagittal reformations were   generated. CT dose reduction was achieved through use of a standardized   protocol tailored for this examination and automatic exposure control for dose   modulation. FINDINGS:   LUNG BASES:Esophageal hernia. LIVER: Hepatic steatosis There is no intrahepatic duct dilatation. There is no   hepatic parenchymal mass. Hepatic enhancement pattern is within normal limits. Portal vein is patent. GALLBLADDER:  No dilatation or wall thickening. SPLEEN/PANCREAS: No mass. There is no pancreatic duct dilatation. There is no   evidence of splenomegaly. ADRENALS/KIDNEYS: No mass. There is no hydronephrosis. There is no renal mass. There is no perinephric mass. STOMACH: Gastrostomy in place. COLON AND SMALL BOWEL: No dilatation or wall thickening. Fluid-filled loops of   large and small bowel. There is no free intraperitoneal air. There is no   evidence of incarceration or obstruction. No mesenteric adenopathy. PERITONEUM: Unremarkable. APPENDIX: Unremarkable. BLADDER/REPRODUCTIVE ORGANS: Large uterine fibroids. RETROPERITONEUM: Unremarkable. The abdominal aorta is normal in caliber. No   aneurysm. No retroperitoneal adenopathy. OSSEOUS STRUCTURES: No destructive bone lesion. CXR Results  (Last 48 hours)               05/25/22 0506  XR ABD ACUTE W 1 V CHEST Final result    Impression:  No acute process identified. Narrative:  Clinical history: abd pain   INDICATION:   abd pain   COMPARISON: 5/9/2022       FINDINGS:   AP portable upright view of the chest erect and supine views of the abdomen. Demonstrates a stable  cardiopericardial silhouette. There is no pleural   effusion. .There is no focal consolidation. .Diminished lung volumes and basilar   atelectasis. . There is no free air or obstruction. There is a gastrojejunostomy   tube.                    Medical Decision Making   I am the first provider for this patient. I reviewed the vital signs, available nursing notes, past medical history, past surgical history, family history and social history. Vital Signs-Reviewed the patient's vital signs. Patient Vitals for the past 12 hrs:   Temp Pulse Resp BP SpO2   05/25/22 0335 97.8 °F (36.6 °C) 92 16 (!) 110/92 100 %           Records Reviewed: Nursing Notes and Old Medical Records    Provider Notes (Medical Decision Making):   Differential diagnosis: Dental pain, bowel obstruction, UTI, constipation, malplacement of G-tube  Will obtain CBC, CMP, UA, abdominal pelvis    ED Course:   Initial assessment performed. The patients presenting problems have been discussed, and they are in agreement with the care plan formulated and outlined with them. I have encouraged them to ask questions as they arise throughout their visit. Progress Notes:   Labs and imaging reviewed. CBC shows mild anemia with hemoglobin of 10.4.  UA does not show definite UTI. CT shows enlarged uterus with fibroids. G-tube is in place. Suspect pain may be due to reported dental fracture. Will encourage follow-up with dentist.  Return for worsening symptoms. Disposition:  Discharge home    PLAN:  1. Current Discharge Medication List        2. Follow-up Information     Follow up With Specialties Details Why Contact Shavonne Peña MD Family Medicine Schedule an appointment as soon as possible for a visit   [phone]      [facility] EMERGENCY DEPT Emergency Medicine  If symptoms worsen [address]  [address]  [phone]    Her OB/GYN  Schedule an appointment as soon as possible for a visit           Return to ED if worse     Diagnosis     Clinical Impression:   1. Pain due to dental caries    2. Abdominal distention    3.  Uterine leiomyoma, unspecified location

## 2022-05-25 NOTE — ED NOTES
Shift change report completed with Dianne Hatchet., CHARLA to include SBAR, MAR, pending CT result, peg tube dressing c/o on arrival but no blood found in tube, slight pink dc on dressing.

## 2022-05-25 NOTE — ED NOTES
Pure wick placed after adult brief changed incontinent of urine only, pericare performed, tolerated well

## 2023-02-17 ENCOUNTER — APPOINTMENT (OUTPATIENT)
Dept: CT IMAGING | Age: 38
End: 2023-02-17
Attending: EMERGENCY MEDICINE
Payer: MEDICARE

## 2023-02-17 ENCOUNTER — HOSPITAL ENCOUNTER (EMERGENCY)
Age: 38
Discharge: HOME | End: 2023-02-17
Attending: EMERGENCY MEDICINE
Payer: MEDICARE

## 2023-02-17 VITALS
HEART RATE: 108 BPM | SYSTOLIC BLOOD PRESSURE: 129 MMHG | TEMPERATURE: 97.5 F | WEIGHT: 165 LBS | RESPIRATION RATE: 19 BRPM | DIASTOLIC BLOOD PRESSURE: 103 MMHG | OXYGEN SATURATION: 97 % | HEIGHT: 67 IN | BODY MASS INDEX: 25.9 KG/M2

## 2023-02-17 DIAGNOSIS — Z90.710 S/P HYSTERECTOMY: ICD-10-CM

## 2023-02-17 DIAGNOSIS — J98.11 MILD BIBASILAR ATELECTASIS: ICD-10-CM

## 2023-02-17 DIAGNOSIS — R53.1 GENERALIZED WEAKNESS: Primary | ICD-10-CM

## 2023-02-17 DIAGNOSIS — K59.00 CONSTIPATION, UNSPECIFIED CONSTIPATION TYPE: ICD-10-CM

## 2023-02-17 DIAGNOSIS — G80.9 CEREBRAL PALSY, UNSPECIFIED TYPE (HCC): ICD-10-CM

## 2023-02-17 LAB
ALBUMIN SERPL-MCNC: 3.2 G/DL (ref 3.5–5)
ALBUMIN/GLOB SERPL: 0.6 (ref 1.1–2.2)
ALP SERPL-CCNC: 94 U/L (ref 45–117)
ALT SERPL-CCNC: 28 U/L (ref 12–78)
ANION GAP SERPL CALC-SCNC: 9 MMOL/L (ref 5–15)
AST SERPL-CCNC: 54 U/L (ref 15–37)
BASOPHILS # BLD: 0 K/UL (ref 0–0.1)
BASOPHILS NFR BLD: 1 % (ref 0–1)
BILIRUB SERPL-MCNC: 0.4 MG/DL (ref 0.2–1)
BUN SERPL-MCNC: 13 MG/DL (ref 6–20)
BUN/CREAT SERPL: 34 (ref 12–20)
CALCIUM SERPL-MCNC: 9.3 MG/DL (ref 8.5–10.1)
CHLORIDE SERPL-SCNC: 102 MMOL/L (ref 97–108)
CO2 SERPL-SCNC: 26 MMOL/L (ref 21–32)
CREAT SERPL-MCNC: 0.38 MG/DL (ref 0.55–1.02)
DIFFERENTIAL METHOD BLD: ABNORMAL
EOSINOPHIL # BLD: 0 K/UL (ref 0–0.4)
EOSINOPHIL NFR BLD: 0 % (ref 0–7)
ERYTHROCYTE [DISTWIDTH] IN BLOOD BY AUTOMATED COUNT: 15.8 % (ref 11.5–14.5)
FLUAV RNA SPEC QL NAA+PROBE: NOT DETECTED
FLUBV RNA SPEC QL NAA+PROBE: NOT DETECTED
GLOBULIN SER CALC-MCNC: 5 G/DL (ref 2–4)
GLUCOSE SERPL-MCNC: 87 MG/DL (ref 65–100)
HCT VFR BLD AUTO: 36.1 % (ref 35–47)
HGB BLD-MCNC: 11.4 G/DL (ref 11.5–16)
IMM GRANULOCYTES # BLD AUTO: 0 K/UL (ref 0–0.04)
IMM GRANULOCYTES NFR BLD AUTO: 0 % (ref 0–0.5)
LACTATE SERPL-SCNC: 0.9 MMOL/L (ref 0.4–2)
LIPASE SERPL-CCNC: 59 U/L (ref 73–393)
LYMPHOCYTES # BLD: 1.6 K/UL (ref 0.8–3.5)
LYMPHOCYTES NFR BLD: 35 % (ref 12–49)
MCH RBC QN AUTO: 25.2 PG (ref 26–34)
MCHC RBC AUTO-ENTMCNC: 31.6 G/DL (ref 30–36.5)
MCV RBC AUTO: 79.9 FL (ref 80–99)
MONOCYTES # BLD: 0.6 K/UL (ref 0–1)
MONOCYTES NFR BLD: 13 % (ref 5–13)
NEUTS SEG # BLD: 2.3 K/UL (ref 1.8–8)
NEUTS SEG NFR BLD: 51 % (ref 32–75)
NRBC # BLD: 0 K/UL (ref 0–0.01)
NRBC BLD-RTO: 0 PER 100 WBC
PLATELET # BLD AUTO: 288 K/UL (ref 150–400)
PMV BLD AUTO: 11.2 FL (ref 8.9–12.9)
POTASSIUM SERPL-SCNC: 5.1 MMOL/L (ref 3.5–5.1)
PROT SERPL-MCNC: 8.2 G/DL (ref 6.4–8.2)
RBC # BLD AUTO: 4.52 M/UL (ref 3.8–5.2)
SARS-COV-2 RNA RESP QL NAA+PROBE: NOT DETECTED
SODIUM SERPL-SCNC: 137 MMOL/L (ref 136–145)
WBC # BLD AUTO: 4.5 K/UL (ref 3.6–11)

## 2023-02-17 PROCEDURE — 96374 THER/PROPH/DIAG INJ IV PUSH: CPT

## 2023-02-17 PROCEDURE — 74177 CT ABD & PELVIS W/CONTRAST: CPT

## 2023-02-17 PROCEDURE — 80053 COMPREHEN METABOLIC PANEL: CPT

## 2023-02-17 PROCEDURE — 74011250636 HC RX REV CODE- 250/636: Performed by: EMERGENCY MEDICINE

## 2023-02-17 PROCEDURE — 99285 EMERGENCY DEPT VISIT HI MDM: CPT

## 2023-02-17 PROCEDURE — 96375 TX/PRO/DX INJ NEW DRUG ADDON: CPT

## 2023-02-17 PROCEDURE — 87040 BLOOD CULTURE FOR BACTERIA: CPT

## 2023-02-17 PROCEDURE — 36415 COLL VENOUS BLD VENIPUNCTURE: CPT

## 2023-02-17 PROCEDURE — 83690 ASSAY OF LIPASE: CPT

## 2023-02-17 PROCEDURE — 96361 HYDRATE IV INFUSION ADD-ON: CPT

## 2023-02-17 PROCEDURE — 87636 SARSCOV2 & INF A&B AMP PRB: CPT

## 2023-02-17 PROCEDURE — 74011000636 HC RX REV CODE- 636: Performed by: EMERGENCY MEDICINE

## 2023-02-17 PROCEDURE — 83605 ASSAY OF LACTIC ACID: CPT

## 2023-02-17 PROCEDURE — 85025 COMPLETE CBC W/AUTO DIFF WBC: CPT

## 2023-02-17 RX ORDER — ONDANSETRON 2 MG/ML
4 INJECTION INTRAMUSCULAR; INTRAVENOUS
Status: COMPLETED | OUTPATIENT
Start: 2023-02-17 | End: 2023-02-17

## 2023-02-17 RX ORDER — ACETAMINOPHEN 500 MG
1000 TABLET ORAL
Status: DISCONTINUED | OUTPATIENT
Start: 2023-02-17 | End: 2023-02-18 | Stop reason: HOSPADM

## 2023-02-17 RX ORDER — LORAZEPAM 2 MG/ML
1 INJECTION INTRAMUSCULAR ONCE
Status: COMPLETED | OUTPATIENT
Start: 2023-02-17 | End: 2023-02-17

## 2023-02-17 RX ORDER — LORAZEPAM 2 MG/ML
2 INJECTION INTRAMUSCULAR ONCE
Status: DISCONTINUED | OUTPATIENT
Start: 2023-02-17 | End: 2023-02-17

## 2023-02-17 RX ORDER — ACETAMINOPHEN 500 MG
1000 TABLET ORAL
Status: DISCONTINUED | OUTPATIENT
Start: 2023-02-17 | End: 2023-02-17

## 2023-02-17 RX ADMIN — IOPAMIDOL 100 ML: 755 INJECTION, SOLUTION INTRAVENOUS at 21:44

## 2023-02-17 RX ADMIN — ONDANSETRON 4 MG: 2 INJECTION INTRAMUSCULAR; INTRAVENOUS at 20:57

## 2023-02-17 RX ADMIN — SODIUM CHLORIDE 1000 ML: 9 INJECTION, SOLUTION INTRAVENOUS at 21:03

## 2023-02-17 RX ADMIN — LORAZEPAM 1 MG: 2 INJECTION INTRAMUSCULAR; INTRAVENOUS at 20:59

## 2023-02-18 NOTE — DISCHARGE INSTRUCTIONS
Thank You! It was a pleasure taking care of you in our Emergency Department today. We know that when you come to [facility], you are entrusting us with your health, comfort, and safety. Our physicians and nurses honor that trust, and truly appreciate the opportunity to care for you and your loved ones. We also value your feedback. If you receive a survey about your Emergency Department experience today, please fill it out. We care about our patients' feedback, and we listen to what you have to say. Thank you. Dr. Joanne Mcnamara M.D.      ____________________________________________________________________  I have included a copy of your lab results and/or radiologic studies from today's visit so you can have them easily available at your follow-up visit. We hope you feel better and please do not hesitate to contact the ED if you have any questions at all! Recent Results (from the past 12 hour(s))   CBC WITH AUTOMATED DIFF    Collection Time: 02/17/23  8:32 PM   Result Value Ref Range    WBC 4.5 3.6 - 11.0 K/uL    RBC 4.52 3.80 - 5.20 M/uL    HGB 11.4 (L) 11.5 - 16.0 g/dL    HCT 36.1 35.0 - 47.0 %    MCV 79.9 (L) 80.0 - 99.0 FL    MCH 25.2 (L) 26.0 - 34.0 PG    MCHC 31.6 30.0 - 36.5 g/dL    RDW 15.8 (H) 11.5 - 14.5 %    PLATELET 582 405 - 861 K/uL    MPV 11.2 8.9 - 12.9 FL    NRBC 0.0 0  WBC    ABSOLUTE NRBC 0.00 0.00 - 0.01 K/uL    NEUTROPHILS 51 32 - 75 %    LYMPHOCYTES 35 12 - 49 %    MONOCYTES 13 5 - 13 %    EOSINOPHILS 0 0 - 7 %    BASOPHILS 1 0 - 1 %    IMMATURE GRANULOCYTES 0 0.0 - 0.5 %    ABS. NEUTROPHILS 2.3 1.8 - 8.0 K/UL    ABS. LYMPHOCYTES 1.6 0.8 - 3.5 K/UL    ABS. MONOCYTES 0.6 0.0 - 1.0 K/UL    ABS. EOSINOPHILS 0.0 0.0 - 0.4 K/UL    ABS. BASOPHILS 0.0 0.0 - 0.1 K/UL    ABS. IMM.  GRANS. 0.0 0.00 - 0.04 K/UL    DF AUTOMATED     METABOLIC PANEL, COMPREHENSIVE    Collection Time: 02/17/23  8:32 PM   Result Value Ref Range    Sodium 137 136 - 145 mmol/L Potassium 5.1 3.5 - 5.1 mmol/L    Chloride 102 97 - 108 mmol/L    CO2 26 21 - 32 mmol/L    Anion gap 9 5 - 15 mmol/L    Glucose 87 65 - 100 mg/dL    BUN 13 6 - 20 MG/DL    Creatinine 0.38 (L) 0.55 - 1.02 MG/DL    BUN/Creatinine ratio 34 (H) 12 - 20      eGFR >60 >60 ml/min/1.73m2    Calcium 9.3 8.5 - 10.1 MG/DL    Bilirubin, total 0.4 0.2 - 1.0 MG/DL    ALT (SGPT) 28 12 - 78 U/L    AST (SGOT) 54 (H) 15 - 37 U/L    Alk. phosphatase 94 45 - 117 U/L    Protein, total 8.2 6.4 - 8.2 g/dL    Albumin 3.2 (L) 3.5 - 5.0 g/dL    Globulin 5.0 (H) 2.0 - 4.0 g/dL    A-G Ratio 0.6 (L) 1.1 - 2.2     LIPASE    Collection Time: 02/17/23  8:32 PM   Result Value Ref Range    Lipase 59 (L) 73 - 393 U/L   LACTIC ACID    Collection Time: 02/17/23  8:49 PM   Result Value Ref Range    Lactic acid 0.9 0.4 - 2.0 MMOL/L   COVID-19 WITH INFLUENZA A/B    Collection Time: 02/17/23  8:51 PM   Result Value Ref Range    SARS-CoV-2 by PCR Not detected NOTD      Influenza A by PCR Not detected      Influenza B by PCR Not detected         CT ABD PELV W CONT   Final Result   1. No evidence of complication status post hysterectomy. 2.  Incidental findings as above. CT Results  (Last 48 hours)                 02/17/23 2144  CT ABD PELV W CONT Final result    Impression:  1. No evidence of complication status post hysterectomy. 2.  Incidental findings as above. Narrative:  EXAM: CT ABD PELV W CONT       INDICATION: post-op complication, abd pain, hysterectomy on Jan 26       COMPARISON: 5/25/2022        CONTRAST: 100 mL of Isovue-370. TECHNIQUE:    Following the uneventful intravenous administration of contrast, thin axial   images were obtained through the abdomen and pelvis. Coronal and sagittal   reconstructions were generated. Oral contrast was not administered. CT dose   reduction was achieved through use of a standardized protocol tailored for this   examination and automatic exposure control for dose modulation. FINDINGS:    LOWER THORAX: Bibasilar atelectasis   LIVER: No mass. BILIARY TREE: Gallbladder is mildly distended. CBD is not dilated. SPLEEN: within normal limits. PANCREAS: Evaluation is limited by motion   ADRENALS: Unremarkable. KIDNEYS: No mass, calculus, or hydronephrosis. STOMACH: Gastric tube   SMALL BOWEL: No dilatation or wall thickening. COLON: Moderate stool burden in the distal colon. APPENDIX: Not well visualized   PERITONEUM: No ascites or pneumoperitoneum. RETROPERITONEUM: No lymphadenopathy or aortic aneurysm. REPRODUCTIVE ORGANS: Hysterectomy   URINARY BLADDER: No mass or calculus. BONES: Probable right hip dysplasia   ABDOMINAL WALL: Mild subcutaneous fat stranding in the lower abdominal wall. No   fluid collections   ADDITIONAL COMMENTS: N/A                 The exam and treatment you received in the Emergency Department were for an urgent problem and are not intended as complete care. It is important that you follow up with a doctor, nurse practitioner, or physician assistant for ongoing care. If your symptoms become worse or you do not improve as expected and you are unable to reach your usual health care provider, you should return to the Emergency Department. We are available 24 hours a day. Please take your discharge instructions with you when you go to your follow-up appointment. If a prescription has been provided, please have it filled as soon as possible to prevent a delay in treatment. Read the entire medication instruction sheet provided to you by the pharmacy. If you have any questions or reservations about taking the medication due to side effects or interactions with other medications, please call your primary care physician or contact the ER to speak with the charge nurse. Please make an appointment with your family doctor or the physician you were referred to for follow-up of this visit as instructed on your discharge paperwork.  Return to the ER if you are unable to be seen or if you are unable to be seen in a timely manner. If you have any problem arranging the follow-up visit, contact the Emergency Department immediately.

## 2023-02-18 NOTE — ED NOTES
Pt presents to ED complaining of fever, elevated blood pressure, and rapid heart rate. Pt is postop, had hysterectomy 2 weeks ago at [facility]. Pt has hx of cerebral palsy, wheelchair bound. . Pt is alert and oriented x 4, RR even and unlabored, skin is warm and dry. Assessment completed and pt updated on plan of care. Call bell in reach. Emergency Department Nursing Plan of Care       The Nursing Plan of Care is developed from the Nursing assessment and Emergency Department Attending provider initial evaluation. The plan of care may be reviewed in the ED Provider note.     The Plan of Care was developed with the following considerations:   Patient / Family readiness to learn indicated by:verbalized understanding  Persons(s) to be included in education: care giver  Barriers to Learning/Limitations:No    Signed

## 2023-02-18 NOTE — ED NOTES
Discharge instructions were given to the patient by Leanne Burnette RN. The patient left the Emergency Department ambulatory, alert and oriented and in no acute distress with 0 prescriptions. The patient was encouraged to call or return to the ED for worsening issues or problems and was encouraged to schedule a follow up appointment for continuing care. The patient verbalized understanding of discharge instructions and prescriptions, all questions were answered. The patient has no further concerns at this time.

## 2023-02-18 NOTE — ED PROVIDER NOTES
EMERGENCY DEPARTMENT HISTORY AND PHYSICAL EXAM            Please note that this dictation was completed with the assistance of \"Dragon\", the computer voice recognition software. Quite often unanticipated grammatical, syntax, homophones, and other interpretive errors are inadvertently transcribed by the computer software. Please disregard these errors and any errors that have escaped final proofreading. Thank you. Date of Evaluation: 02/18/23  Patient: Nara Hyatt  Patient Age and Sex: 30 y.o. female   MRN: [id number]  CSN: [id number]  PCP: Henny Crockett MD    History of Present Illness     Chief Complaint   Patient presents with    Fever    Post OP Complication     Per aunt reports fever, elevated blood pressure and tachycardia that started today, hysterectomy performed at [facility] 2 weeks ago. PMHx cerebral palsy. History Provided By: Patient/family/EMS (if available)    HPI Limitations : None    HPI: Nara Hyatt, 30 y.o. female with past medical history as documented below presents to the ED with c/o of concerns for infection with low-grade fever, elevated blood pressure readings and elevated heart rate. Patient's aunt states that patient had a hysterectomy obtain approximately 2 weeks ago. Patient has been taking Tylenol as needed for pain. Patient's OB/GYN was Dr. Cheri Wild. . Pt denies any other exacerbating or ameliorating factors. There are no other complaints, changes or physical findings pertinent to the HPI at this time. Nursing Notes were all reviewed and agreed with or any disagreements were addressed in the HPI.     Past History   Past Medical History:  Past Medical History:   Diagnosis Date    Aspiration into respiratory tract     Asthma     Cerebral palsy ([facility]     Seizure ([facility]     Skull fractures ([facility]     @ birth       Past Surgical History:  Past Surgical History:   Procedure Laterality Date    HX GI      feeding tube; nipson to help prevent asipration IR REPLACE GASTRO/CECOSTOMY TUBE PERC  5/17/2022    IR REPLACE GASTRO/JEJUNO TUBE PERC  9/10/2021       Family History:   Family history reviewed and was non-contributory, unless specified below:  Family History   Problem Relation Age of Onset    No Known Problems Mother     No Known Problems Father        Social History:  Social History     Tobacco Use    Smoking status: Never    Smokeless tobacco: Never   Substance Use Topics    Alcohol use: No    Drug use: No       Allergies:  No Known Allergies    Current Medications:  No current facility-administered medications on file prior to encounter. Current Outpatient Medications on File Prior to Encounter   Medication Sig Dispense Refill    dicyclomine (BENTYL) 20 mg tablet Take 1 Tablet by mouth every six (6) hours. 20 Tablet 0    polyethylene glycol (Miralax) 17 gram/dose powder Take 17 g by mouth daily. 1 tablespoon with 8 oz of water daily 289 g 0    clonazePAM (KlonoPIN) 1 mg tablet Take 1 mg by mouth four (4) times daily. DISSOLVE ONE TABLET BY MOUTH IN THE AFTERNOON AND 3 TABLETS EVERY NIGHT AT BEDTIME FOR INSOMNIA      melatonin 1 mg tablet Take 2 mg by mouth nightly. baclofen (LIORESAL) 20 mg tablet Take 1 mg by mouth four (4) times daily. Review of Systems   A complete ROS was reviewed by me today and all other systems negative, unless otherwise specified below:  Review of Systems   Unable to perform ROS: Patient nonverbal   Physical Exam   Patient Vitals for the past 24 hrs:   Temp Pulse Resp BP SpO2   02/17/23 1821 97.5 °F (36.4 °C) (!) 108 19 (!) 129/103 97 %       Physical Exam  Vitals and nursing note reviewed. Constitutional:       General: She is not in acute distress. Appearance: She is well-developed. She is not diaphoretic. HENT:      Head: Normocephalic and atraumatic. Mouth/Throat:      Pharynx: No oropharyngeal exudate.    Eyes:      Conjunctiva/sclera: Conjunctivae normal.      Pupils: Pupils are equal, round, and reactive to light. Cardiovascular:      Rate and Rhythm: Normal rate and regular rhythm. Heart sounds: Normal heart sounds. No murmur heard. No friction rub. No gallop. Pulmonary:      Effort: Pulmonary effort is normal. No respiratory distress. Breath sounds: Normal breath sounds. No wheezing or rales. Chest:      Chest wall: No tenderness. Abdominal:      General: Bowel sounds are normal. There is no distension. Palpations: Abdomen is soft. There is no mass. Tenderness: There is no abdominal tenderness. There is no guarding or rebound. Comments: Tube in place, no erythema, no drainage noted. Hysterectomy incision well-healed. There is an area of Dermabond and dressing in place without saturation. Musculoskeletal:         General: No tenderness or deformity. Normal range of motion. Cervical back: Normal range of motion. Skin:     General: Skin is warm. Findings: No rash. Neurological:      Mental Status: She is alert and oriented to person, place, and time. Cranial Nerves: No cranial nerve deficit. Motor: No abnormal muscle tone. Coordination: Coordination normal.      Deep Tendon Reflexes: Reflexes normal.     Diagnostic Studies     LABORATORY RESULTS:  I have personally reviewed and interpreted all available laboratory results.    Recent Results (from the past 24 hour(s))   CBC WITH AUTOMATED DIFF    Collection Time: 02/17/23  8:32 PM   Result Value Ref Range    WBC 4.5 3.6 - 11.0 K/uL    RBC 4.52 3.80 - 5.20 M/uL    HGB 11.4 (L) 11.5 - 16.0 g/dL    HCT 36.1 35.0 - 47.0 %    MCV 79.9 (L) 80.0 - 99.0 FL    MCH 25.2 (L) 26.0 - 34.0 PG    MCHC 31.6 30.0 - 36.5 g/dL    RDW 15.8 (H) 11.5 - 14.5 %    PLATELET 287 638 - 915 K/uL    MPV 11.2 8.9 - 12.9 FL    NRBC 0.0 0  WBC    ABSOLUTE NRBC 0.00 0.00 - 0.01 K/uL    NEUTROPHILS 51 32 - 75 %    LYMPHOCYTES 35 12 - 49 %    MONOCYTES 13 5 - 13 %    EOSINOPHILS 0 0 - 7 %    BASOPHILS 1 0 - 1 % IMMATURE GRANULOCYTES 0 0.0 - 0.5 %    ABS. NEUTROPHILS 2.3 1.8 - 8.0 K/UL    ABS. LYMPHOCYTES 1.6 0.8 - 3.5 K/UL    ABS. MONOCYTES 0.6 0.0 - 1.0 K/UL    ABS. EOSINOPHILS 0.0 0.0 - 0.4 K/UL    ABS. BASOPHILS 0.0 0.0 - 0.1 K/UL    ABS. IMM. GRANS. 0.0 0.00 - 0.04 K/UL    DF AUTOMATED     METABOLIC PANEL, COMPREHENSIVE    Collection Time: 02/17/23  8:32 PM   Result Value Ref Range    Sodium 137 136 - 145 mmol/L    Potassium 5.1 3.5 - 5.1 mmol/L    Chloride 102 97 - 108 mmol/L    CO2 26 21 - 32 mmol/L    Anion gap 9 5 - 15 mmol/L    Glucose 87 65 - 100 mg/dL    BUN 13 6 - 20 MG/DL    Creatinine 0.38 (L) 0.55 - 1.02 MG/DL    BUN/Creatinine ratio 34 (H) 12 - 20      eGFR >60 >60 ml/min/1.73m2    Calcium 9.3 8.5 - 10.1 MG/DL    Bilirubin, total 0.4 0.2 - 1.0 MG/DL    ALT (SGPT) 28 12 - 78 U/L    AST (SGOT) 54 (H) 15 - 37 U/L    Alk. phosphatase 94 45 - 117 U/L    Protein, total 8.2 6.4 - 8.2 g/dL    Albumin 3.2 (L) 3.5 - 5.0 g/dL    Globulin 5.0 (H) 2.0 - 4.0 g/dL    A-G Ratio 0.6 (L) 1.1 - 2.2     LIPASE    Collection Time: 02/17/23  8:32 PM   Result Value Ref Range    Lipase 59 (L) 73 - 393 U/L   LACTIC ACID    Collection Time: 02/17/23  8:49 PM   Result Value Ref Range    Lactic acid 0.9 0.4 - 2.0 MMOL/L   COVID-19 WITH INFLUENZA A/B    Collection Time: 02/17/23  8:51 PM   Result Value Ref Range    SARS-CoV-2 by PCR Not detected NOTD      Influenza A by PCR Not detected      Influenza B by PCR Not detected         RADIOLOGY RESULTS:  I have personally reviewed and interpreted all available imaging studies and agree with radiology interpretation. CT ABD PELV W CONT   Final Result   1. No evidence of complication status post hysterectomy. 2.  Incidental findings as above. CT Results  (Last 48 hours)                 02/17/23 2144  CT ABD PELV W CONT Final result    Impression:  1. No evidence of complication status post hysterectomy. 2.  Incidental findings as above.        Narrative:  EXAM: CT ABD PELV W CONT       INDICATION: post-op complication, abd pain, hysterectomy on Jan 26       COMPARISON: 5/25/2022        CONTRAST: 100 mL of Isovue-370. TECHNIQUE:    Following the uneventful intravenous administration of contrast, thin axial   images were obtained through the abdomen and pelvis. Coronal and sagittal   reconstructions were generated. Oral contrast was not administered. CT dose   reduction was achieved through use of a standardized protocol tailored for this   examination and automatic exposure control for dose modulation. FINDINGS:    LOWER THORAX: Bibasilar atelectasis   LIVER: No mass. BILIARY TREE: Gallbladder is mildly distended. CBD is not dilated. SPLEEN: within normal limits. PANCREAS: Evaluation is limited by motion   ADRENALS: Unremarkable. KIDNEYS: No mass, calculus, or hydronephrosis. STOMACH: Gastric tube   SMALL BOWEL: No dilatation or wall thickening. COLON: Moderate stool burden in the distal colon. APPENDIX: Not well visualized   PERITONEUM: No ascites or pneumoperitoneum. RETROPERITONEUM: No lymphadenopathy or aortic aneurysm. REPRODUCTIVE ORGANS: Hysterectomy   URINARY BLADDER: No mass or calculus. BONES: Probable right hip dysplasia   ABDOMINAL WALL: Mild subcutaneous fat stranding in the lower abdominal wall. No   fluid collections   ADDITIONAL COMMENTS: N/A                 CXR Results  (Last 48 hours)      None          CT ABD PELV W CONT   Final Result   1. No evidence of complication status post hysterectomy. 2.  Incidental findings as above. MEDICAL DECISION MAKING   I am the first and primary ED physician for this patient's ED visit today. I reviewed our EMR for any past records that may contribute to the patient's current condition, including their past medical, surgical, social and family history. This also includes their most recent ED visits, previous hospitalizations and prior diagnostic data.  I have reviewed and summarized the most pertinent findings in my HPI and MDM. Vital Signs Reviewed:  Patient Vitals for the past 24 hrs:   Temp Pulse Resp BP SpO2   02/17/23 1821 97.5 °F (36.4 °C) (!) 108 19 (!) 129/103 97 %     Pulse Oximetry Analysis: 97% on RA with good pleth    Cardiac Monitor:   Rate: 99 bpm  The cardiac monitor revealed the following rhythm as interpreted by me: Normal Sinus Rhythm  Cardiac monitoring was ordered to monitor patient for signs of cardiac dysrhythmia, which they are at risk for based on their history and/or risk for cardiovascular disease and/or metabolic abnormalities. Records Reviewed: Nursing Notes, Old Medical Records, Previous electrocardiograms, Previous Radiology Studies and Previous Laboratory Studies, EMS reports    DIFFERENTIAL DIAGNOSIS AND MDM:  Patient presents with fever, tachycardia and concerns for infection. DDx: sepsis 2/2 UTI, PNA, intraabdominal infection (colitis, appendicitis, cholecystitis), infectious diarrhea, meningitis, soft tissue infection, septic arthritis, flu/viral prodrome. Will follow sepsis protocol and order set by providing IVF resuscitation, obtaining blood and urine cultures, antibiotics, labs, serial lactates, EKG and frequently reassessing hemodynamic status on the patient. Will hold off on aggressive fluid hydration unless patient shows signs of severe sepsis or shock. Review of Prior Records and External Documents:   reviewed: YES - I have reviewed the patient's controlled substance prescription history thru the Prescription Monitoring Program so that the prescription(s) for a controlled substance can be given. Prior hospital discharge summaries and clinic notes reviewed: Reviewed OB/GYN note from February 10, 2023. Patient was seen by her OB/GYN. Dr. Annita Tabares, on February 10 for postop evaluation. She was noted to have a superficial dehiscence of the wound. Independent History:    An independent clinically history was obtained from family. Patient is on who is the primary caregiver states that patient had elevated blood pressure and elevated heart rate. Patient also had a low-grade temp prior to arrival.    Social Determinants of Health:  Patients evaluation and management were significantly impacted by social determinants of health. ED Course: Progress Notes, Reevaluation, and Consults:  Initial assessment performed. I discussed presenting problems and concerns, and my formulated plan for today's visit with the patient and any available family members. I have encouraged them to ask questions as they arise throughout the visit. ED Physician Orders:   Orders Placed This Encounter    CULTURE, BLOOD, PAIRED    COVID-19 WITH INFLUENZA A/B    CT ABD PELV W CONT    CBC WITH AUTOMATED DIFF    METABOLIC PANEL, COMPREHENSIVE    LIPASE    URINALYSIS W/ REFLEX CULTURE    LACTIC ACID, PLASMA    LACTIC ACID, PLASMA    MEASURE RECTAL TEMPERATURE    INSERT PERIPHERAL IV ONE TIME STAT    DISCONTD: acetaminophen (TYLENOL) tablet 1,000 mg    sodium chloride 0.9 % bolus infusion 1,000 mL    ondansetron (ZOFRAN) injection 4 mg    DISCONTD: (ATIVAN) injection    (ATIVAN) injection    acetaminophen (TYLENOL) tablet 1,000 mg    iopamidoL (ISOVUE-370) 370 mg iodine /mL (76 %) injection 100 mL     ED Medications Given:   Medications   acetaminophen (TYLENOL) tablet 1,000 mg (has no administration in time range)   sodium chloride 0.9 % bolus infusion 1,000 mL (0 mL IntraVENous IV Completed 2/17/23 2247)   ondansetron (ZOFRAN) injection 4 mg (4 mg IntraVENous Given 2/17/23 2057)   (ATIVAN) injection (1 mg IntraVENous Given 2/17/23 2059)   iopamidoL (ISOVUE-370) 370 mg iodine /mL (76 %) injection 100 mL (100 mL IntraVENous Given 2/17/23 2144)     ED Physician Interpretation of Test Results:   All results were independently reviewed and interpreted by myself, notably showing:     RADIOLOGY:  Non-plain film images such as CT, ultrasound and MRI are read by the radiologistOg Shields radiographic images are visualized and preliminarily interpreted by the ED Provider with the below findings:     Imaging interpreted by me:     Interpretation per the Radiologist below, if available at the time of this note:     CT ABD PELV W CONT   Final Result   1. No evidence of complication status post hysterectomy. 2.  Incidental findings as above. CT Results  (Last 48 hours)                 02/17/23 2144  CT ABD PELV W CONT Final result    Impression:  1. No evidence of complication status post hysterectomy. 2.  Incidental findings as above. Narrative:  EXAM: CT ABD PELV W CONT       INDICATION: post-op complication, abd pain, hysterectomy on Jan 26       COMPARISON: 5/25/2022        CONTRAST: 100 mL of Isovue-370. TECHNIQUE:    Following the uneventful intravenous administration of contrast, thin axial   images were obtained through the abdomen and pelvis. Coronal and sagittal   reconstructions were generated. Oral contrast was not administered. CT dose   reduction was achieved through use of a standardized protocol tailored for this   examination and automatic exposure control for dose modulation. FINDINGS:    LOWER THORAX: Bibasilar atelectasis   LIVER: No mass. BILIARY TREE: Gallbladder is mildly distended. CBD is not dilated. SPLEEN: within normal limits. PANCREAS: Evaluation is limited by motion   ADRENALS: Unremarkable. KIDNEYS: No mass, calculus, or hydronephrosis. STOMACH: Gastric tube   SMALL BOWEL: No dilatation or wall thickening. COLON: Moderate stool burden in the distal colon. APPENDIX: Not well visualized   PERITONEUM: No ascites or pneumoperitoneum. RETROPERITONEUM: No lymphadenopathy or aortic aneurysm. REPRODUCTIVE ORGANS: Hysterectomy   URINARY BLADDER: No mass or calculus. BONES: Probable right hip dysplasia   ABDOMINAL WALL: Mild subcutaneous fat stranding in the lower abdominal wall.  No   fluid collections ADDITIONAL COMMENTS: N/A                 CXR Results  (Last 48 hours)      None          CT ABD PELV W CONT   Final Result   1. No evidence of complication status post hysterectomy. 2.  Incidental findings as above. My interpretation of EKG: No STEMI. See my EKG interpretation in ED course above. My interpretation of laboratory results: Labs showing CBC without leukocytosis, hemoglobin 11.4 which is better than baseline, CMP unremarkable, COVID and influenza screens negative. Blood cultures have been sent. Progress Note:  I have just re-evaluated the patient. Pt reports improvement of her symptoms. I have reviewed her vital signs and determined there is currently no worsening in their condition or physical exam. Results have been reviewed with them and their questions have been answered. I will continue to review further results as they come available. Amount and/or Complexity of Data Reviewed  Medical Complexity: HIGH  Presentation: ACUTE and SEVERE  Clinical lab tests: ordered as appropriate & reviewed  Tests in the radiology section of CPT®: ordered as appropriate & reviewed  Tests in the medicine section of CPT®: ordered as appropriate & reviewed  Obtain history from someone other than the patient: yes  Review and summarize past medical records: yes  Independent visualization of images, tracings, or specimens: yes    Risks  OTC drugs. Prescription drug management. Parenteral controlled substances. Drug therapy requiring intensive monitoring for toxicity. Progress Note:  Tachycardia improved with fluid resuscitation. Patient did require IV anxiolytics for CT scan. IV Ativan given with good effect. Progress Note:  I have re-examined the patient. Pt states she feels much better and symptoms improved. Tolerating oral intake. Abdomen is soft and without guarding, rebound or other peritoneal signs.  I have discussed with patient the importance of close f/u and to return to the ED if symptoms don't improve or worsen. CRITICAL CARE NOTE :  IMPENDING DETERIORATION -Cardiovascular, Metabolic, and Renal  ASSOCIATED RISK FACTORS - Hypotension, Shock, Hypoxia, Metabolic changes, Dehydration, and Vascular Compromise  MANAGEMENT- Bedside Assessment and Supervision of Care  INTERPRETATION -  Xrays, CT Scan, ECG, Blood Pressure, and Cardiac Output Measures   INTERVENTIONS - hemodynamic mngmt, vascular control, and Metobolic interventions  CASE REVIEW - Nursing and Family  TREATMENT RESPONSE -Improved  PERFORMED BY - Self    NOTES   :  I personally spent 35 minutes of critical care time with this patient. This is time spent at this critically ill patient's bedside actively involved in patient care as well as the coordination of care and discussions with the patient's family. This includes time involved in ordering and reviewing of laboratory studies, pulse oximetry, re-evaluation of patient's condition, examination of patient, evaluation of patient's response to treatment, ordering and performing treatments and interventions, review of old charts, consultations with specialist, discussions with family regarding pertinent collateral history and plan of care, bedside attention and documentation. During this entire length of time I was immediately available to the patient. This does not include time spent on separately reported billable procedures. Critical Care: The reason for providing this level of medical care for this critically-ill patient was due to a critical illness that impaired one or more vital organ systems, such that there was a high probability of imminent or life-threatening deterioration in the patient's condition. This care involved the highest level of preparedness to intervene urgently.  This care involved high complexity decision making to assess, manipulate, and support vital system functions, to treat this degree of vital organ system failure, and to prevent further life threatening deterioration of the patients condition requiring frequent assessments and interventions. True Bosworth, MD    Consideration for admission/observation for: Post-op pain. I engaged patient in shared decision making and she feels significant improvement after ED treatment and is comfortable with discharge with outpatient treatment and PCP follow-up. DISCHARGE  Pt reassessed and symptoms noted to have improved significantly after ED treatment. Fredrick Veliz's labs and imaging have been reviewed with her and available family. She verbally conveys understanding and agreement of the signs, symptoms, diagnosis, treatment and prognosis and additionally agrees to follow up as recommended with Dr. Gurmeet Noble, Rosalia Soriano MD and/or specialist as instructed. She agrees with the care plan we have created and conveys that all of her questions have been answered. Additionally, I have put together a packet of discharge instructions for her that include: 1) Educational information regarding their diagnosis, 2) How to care for their diagnosis at home, as well a 3) List of reasons why they would want to return to the ED prior to their follow-up appointment should their condition change or symptoms worsen. I have answered all questions to the patient's satisfaction. Strict return precautions given. She and/or family conveyed understanding and agreement with care plan. Vital signs stable for discharge. PLAN  1. Return precautions as discussed with patient and available family/caregiver. 2.   Discharge Medication List as of 2/17/2023 10:27 PM        Current Facility-Administered Medications:     acetaminophen (TYLENOL) tablet 1,000 mg, 1,000 mg, Per Corrine Hernandez, NOW, Brant Bonilla MD    Current Outpatient Medications:     dicyclomine (BENTYL) 20 mg tablet, Take 1 Tablet by mouth every six (6) hours. , Disp: 20 Tablet, Rfl: 0    polyethylene glycol (Miralax) 17 gram/dose powder, Take 17 g by mouth daily.  1 tablespoon with 8 oz of water daily, Disp: 289 g, Rfl: 0    clonazePAM (KlonoPIN) 1 mg tablet, Take 1 mg by mouth four (4) times daily. DISSOLVE ONE TABLET BY MOUTH IN THE AFTERNOON AND 3 TABLETS EVERY NIGHT AT BEDTIME FOR INSOMNIA, Disp: , Rfl:     melatonin 1 mg tablet, Take 2 mg by mouth nightly., Disp: , Rfl:     baclofen (LIORESAL) 20 mg tablet, Take 1 mg by mouth four (4) times daily. , Disp: , Rfl:     3. Follow-up Information       Follow up With Specialties Details Why Contact Info    [facility] EMERGENCY DEPT Emergency Medicine   [facility]  [phone]    Angelina Denton, [address]   [address] [phone]            Instructed to return to ED if worse  FINAL DIAGNOSIS   Clinical Impression:  1. Generalized weakness    2. Constipation, unspecified constipation type    3. Cerebral palsy, unspecified type ([address]    4. S/P hysterectomy    5. Mild bibasilar atelectasis      Attestation:  I am the attending of record for this patient. I personally performed the services described in this documentation on this date, 2/17/2023 for patient, Velia Rojas. I have reviewed the chart and verified that the record is accurate and complete.       Samuel Crews MD (Electronic Signature)

## 2023-02-19 LAB
BACTERIA SPEC CULT: NORMAL
SERVICE CMNT-IMP: NORMAL

## 2023-06-07 ENCOUNTER — HOSPITAL ENCOUNTER (OUTPATIENT)
Facility: HOSPITAL | Age: 38
Discharge: HOME | End: 2023-06-07
Attending: STUDENT IN AN ORGANIZED HEALTH CARE EDUCATION/TRAINING PROGRAM | Admitting: STUDENT IN AN ORGANIZED HEALTH CARE EDUCATION/TRAINING PROGRAM
Payer: COMMERCIAL

## 2023-06-07 ENCOUNTER — APPOINTMENT (OUTPATIENT)
Facility: HOSPITAL | Age: 38
End: 2023-06-07
Attending: STUDENT IN AN ORGANIZED HEALTH CARE EDUCATION/TRAINING PROGRAM
Payer: COMMERCIAL

## 2023-06-07 DIAGNOSIS — R63.30 FEEDING DIFFICULTIES: ICD-10-CM

## 2023-06-07 PROCEDURE — 6360000004 HC RX CONTRAST MEDICATION: Performed by: STUDENT IN AN ORGANIZED HEALTH CARE EDUCATION/TRAINING PROGRAM

## 2023-06-07 PROCEDURE — 6370000000 HC RX 637 (ALT 250 FOR IP): Performed by: STUDENT IN AN ORGANIZED HEALTH CARE EDUCATION/TRAINING PROGRAM

## 2023-06-07 PROCEDURE — 49450 REPLACE G/C TUBE PERC: CPT

## 2023-06-07 RX ORDER — LIDOCAINE HYDROCHLORIDE 20 MG/ML
JELLY TOPICAL PRN
Status: COMPLETED | OUTPATIENT
Start: 2023-06-07 | End: 2023-06-07

## 2023-06-07 RX ADMIN — IOPAMIDOL 20 ML: 755 INJECTION, SOLUTION INTRAVENOUS at 10:11

## 2023-06-07 RX ADMIN — LIDOCAINE HYDROCHLORIDE 5 ML: 20 JELLY TOPICAL at 10:06

## 2023-06-07 NOTE — PROGRESS NOTES
PT came in to have g tube evaluated, PT's aunt replaced gtube with temporary tube. PT's aunt wanted the temporary tube to be replaced with a new tube that was sized the same as the one that fell out. PT tolerated procedure well and wheeled to doors for transport.   No concerns

## 2023-07-26 ENCOUNTER — HOSPITAL ENCOUNTER (INPATIENT)
Facility: HOSPITAL | Age: 38
LOS: 7 days | Discharge: HOME | DRG: 463 | End: 2023-08-03
Attending: EMERGENCY MEDICINE | Admitting: INTERNAL MEDICINE
Payer: COMMERCIAL

## 2023-07-26 DIAGNOSIS — G93.40 ENCEPHALOPATHY ACUTE: ICD-10-CM

## 2023-07-26 DIAGNOSIS — R60.1 GENERALIZED EDEMA: ICD-10-CM

## 2023-07-26 DIAGNOSIS — N30.00 ACUTE CYSTITIS WITHOUT HEMATURIA: Primary | ICD-10-CM

## 2023-07-26 DIAGNOSIS — G89.4 PAIN SYNDROME, CHRONIC: ICD-10-CM

## 2023-07-26 DIAGNOSIS — R45.1 AGITATION: ICD-10-CM

## 2023-07-26 LAB
ANION GAP BLD CALC-SCNC: 9 (ref 10–20)
BASE EXCESS BLD CALC-SCNC: 0.8 MMOL/L
BASOPHILS # BLD: 0.1 K/UL (ref 0–0.1)
BASOPHILS NFR BLD: 1 % (ref 0–1)
CA-I BLD-MCNC: 1.28 MMOL/L (ref 1.12–1.32)
CHLORIDE BLD-SCNC: 109 MMOL/L (ref 100–108)
CO2 BLD-SCNC: 25 MMOL/L (ref 19–24)
CREAT UR-MCNC: 0.4 MG/DL (ref 0.6–1.3)
DIFFERENTIAL METHOD BLD: ABNORMAL
EOSINOPHIL # BLD: 0.1 K/UL (ref 0–0.4)
EOSINOPHIL NFR BLD: 1 % (ref 0–7)
ERYTHROCYTE [DISTWIDTH] IN BLOOD BY AUTOMATED COUNT: 12.4 % (ref 11.5–14.5)
GLUCOSE BLD STRIP.AUTO-MCNC: 91 MG/DL (ref 74–106)
HCO3 BLDA-SCNC: 25 MMOL/L
HCT VFR BLD AUTO: 40.8 % (ref 35–47)
HGB BLD-MCNC: 13.8 G/DL (ref 11.5–16)
IMM GRANULOCYTES # BLD AUTO: 0 K/UL (ref 0–0.04)
IMM GRANULOCYTES NFR BLD AUTO: 0 % (ref 0–0.5)
LACTATE BLD-SCNC: 2.39 MMOL/L (ref 0.4–2)
LYMPHOCYTES # BLD: 1.3 K/UL (ref 0.8–3.5)
LYMPHOCYTES NFR BLD: 15 % (ref 12–49)
MCH RBC QN AUTO: 28.6 PG (ref 26–34)
MCHC RBC AUTO-ENTMCNC: 33.8 G/DL (ref 30–36.5)
MCV RBC AUTO: 84.5 FL (ref 80–99)
MONOCYTES # BLD: 1.2 K/UL (ref 0–1)
MONOCYTES NFR BLD: 14 % (ref 5–13)
NEUTS SEG # BLD: 6.3 K/UL (ref 1.8–8)
NEUTS SEG NFR BLD: 69 % (ref 32–75)
NRBC # BLD: 0 K/UL (ref 0–0.01)
NRBC BLD-RTO: 0 PER 100 WBC
PCO2 BLDV: 38.7 MMHG (ref 41–51)
PH BLDV: 7.42 (ref 7.32–7.42)
PLATELET # BLD AUTO: 319 K/UL (ref 150–400)
PMV BLD AUTO: 11 FL (ref 8.9–12.9)
PO2 BLDV: 48 MMHG (ref 25–40)
POTASSIUM BLD-SCNC: 4.5 MMOL/L (ref 3.5–5.5)
RBC # BLD AUTO: 4.83 M/UL (ref 3.8–5.2)
SAO2 % BLD: 84 %
SERVICE CMNT-IMP: ABNORMAL
SODIUM BLD-SCNC: 143 MMOL/L (ref 136–145)
SPECIMEN SITE: ABNORMAL
WBC # BLD AUTO: 9 K/UL (ref 3.6–11)

## 2023-07-26 PROCEDURE — 96374 THER/PROPH/DIAG INJ IV PUSH: CPT

## 2023-07-26 PROCEDURE — 82803 BLOOD GASES ANY COMBINATION: CPT

## 2023-07-26 PROCEDURE — 99285 EMERGENCY DEPT VISIT HI MDM: CPT

## 2023-07-26 PROCEDURE — 36415 COLL VENOUS BLD VENIPUNCTURE: CPT

## 2023-07-26 PROCEDURE — 82947 ASSAY GLUCOSE BLOOD QUANT: CPT

## 2023-07-26 PROCEDURE — 6360000002 HC RX W HCPCS: Performed by: EMERGENCY MEDICINE

## 2023-07-26 PROCEDURE — 82330 ASSAY OF CALCIUM: CPT

## 2023-07-26 PROCEDURE — 84132 ASSAY OF SERUM POTASSIUM: CPT

## 2023-07-26 PROCEDURE — 85025 COMPLETE CBC W/AUTO DIFF WBC: CPT

## 2023-07-26 PROCEDURE — 84295 ASSAY OF SERUM SODIUM: CPT

## 2023-07-26 RX ORDER — CLONAZEPAM 0.5 MG/1
2 TABLET ORAL
Status: COMPLETED | OUTPATIENT
Start: 2023-07-26 | End: 2023-07-27

## 2023-07-26 RX ORDER — LORAZEPAM 2 MG/ML
1 INJECTION INTRAMUSCULAR ONCE
Status: COMPLETED | OUTPATIENT
Start: 2023-07-26 | End: 2023-07-26

## 2023-07-26 RX ADMIN — LORAZEPAM 1 MG: 2 INJECTION INTRAMUSCULAR; INTRAVENOUS at 23:25

## 2023-07-27 ENCOUNTER — APPOINTMENT (OUTPATIENT)
Facility: HOSPITAL | Age: 38
DRG: 463 | End: 2023-07-27
Payer: COMMERCIAL

## 2023-07-27 PROBLEM — N39.0 UTI (URINARY TRACT INFECTION): Status: ACTIVE | Noted: 2023-07-27

## 2023-07-27 LAB
ALBUMIN SERPL-MCNC: 3.7 G/DL (ref 3.5–5)
ALBUMIN/GLOB SERPL: 0.8 (ref 1.1–2.2)
ALP SERPL-CCNC: 95 U/L (ref 45–117)
ALT SERPL-CCNC: 24 U/L (ref 12–78)
ANION GAP SERPL CALC-SCNC: 5 MMOL/L (ref 5–15)
APPEARANCE UR: ABNORMAL
AST SERPL-CCNC: 24 U/L (ref 15–37)
BACTERIA URNS QL MICRO: ABNORMAL /HPF
BILIRUB SERPL-MCNC: 0.9 MG/DL (ref 0.2–1)
BILIRUB UR QL: NEGATIVE
BUN SERPL-MCNC: 15 MG/DL (ref 6–20)
BUN/CREAT SERPL: 22 (ref 12–20)
CALCIUM SERPL-MCNC: 10.2 MG/DL (ref 8.5–10.1)
CHLORIDE SERPL-SCNC: 110 MMOL/L (ref 97–108)
CO2 SERPL-SCNC: 27 MMOL/L (ref 21–32)
COLOR UR: ABNORMAL
COMMENT:: NORMAL
CREAT SERPL-MCNC: 0.68 MG/DL (ref 0.55–1.02)
EPITH CASTS URNS QL MICRO: ABNORMAL /LPF
GLOBULIN SER CALC-MCNC: 4.9 G/DL (ref 2–4)
GLUCOSE SERPL-MCNC: 93 MG/DL (ref 65–100)
GLUCOSE UR STRIP.AUTO-MCNC: NEGATIVE MG/DL
HGB UR QL STRIP: ABNORMAL
KETONES UR QL STRIP.AUTO: 40 MG/DL
LACTATE SERPL-SCNC: 1.8 MMOL/L (ref 0.4–2)
LACTATE SERPL-SCNC: 3.2 MMOL/L (ref 0.4–2)
LEUKOCYTE ESTERASE UR QL STRIP.AUTO: ABNORMAL
NITRITE UR QL STRIP.AUTO: NEGATIVE
PH UR STRIP: 7.5 (ref 5–8)
POTASSIUM SERPL-SCNC: 4.5 MMOL/L (ref 3.5–5.1)
PROT SERPL-MCNC: 8.6 G/DL (ref 6.4–8.2)
PROT UR STRIP-MCNC: 100 MG/DL
RBC #/AREA URNS HPF: ABNORMAL /HPF (ref 0–5)
SODIUM SERPL-SCNC: 142 MMOL/L (ref 136–145)
SP GR UR REFRACTOMETRY: 1.02
SPECIMEN HOLD: NORMAL
TRI-PHOS CRY URNS QL MICRO: ABNORMAL
URINE CULTURE IF INDICATED: ABNORMAL
UROBILINOGEN UR QL STRIP.AUTO: 1 EU/DL (ref 0.2–1)
WBC URNS QL MICRO: >100 /HPF (ref 0–4)

## 2023-07-27 PROCEDURE — 6360000002 HC RX W HCPCS: Performed by: EMERGENCY MEDICINE

## 2023-07-27 PROCEDURE — 6370000000 HC RX 637 (ALT 250 FOR IP): Performed by: INTERNAL MEDICINE

## 2023-07-27 PROCEDURE — 2580000003 HC RX 258: Performed by: INTERNAL MEDICINE

## 2023-07-27 PROCEDURE — 87040 BLOOD CULTURE FOR BACTERIA: CPT

## 2023-07-27 PROCEDURE — 6370000000 HC RX 637 (ALT 250 FOR IP): Performed by: NURSE PRACTITIONER

## 2023-07-27 PROCEDURE — 6360000002 HC RX W HCPCS: Performed by: INTERNAL MEDICINE

## 2023-07-27 PROCEDURE — 87186 SC STD MICRODIL/AGAR DIL: CPT

## 2023-07-27 PROCEDURE — 96372 THER/PROPH/DIAG INJ SC/IM: CPT

## 2023-07-27 PROCEDURE — 71045 X-RAY EXAM CHEST 1 VIEW: CPT

## 2023-07-27 PROCEDURE — 81001 URINALYSIS AUTO W/SCOPE: CPT

## 2023-07-27 PROCEDURE — 80053 COMPREHEN METABOLIC PANEL: CPT

## 2023-07-27 PROCEDURE — 6370000000 HC RX 637 (ALT 250 FOR IP): Performed by: EMERGENCY MEDICINE

## 2023-07-27 PROCEDURE — 87077 CULTURE AEROBIC IDENTIFY: CPT

## 2023-07-27 PROCEDURE — 36415 COLL VENOUS BLD VENIPUNCTURE: CPT

## 2023-07-27 PROCEDURE — 87086 URINE CULTURE/COLONY COUNT: CPT

## 2023-07-27 PROCEDURE — 96375 TX/PRO/DX INJ NEW DRUG ADDON: CPT

## 2023-07-27 PROCEDURE — 83605 ASSAY OF LACTIC ACID: CPT

## 2023-07-27 RX ORDER — 0.9 % SODIUM CHLORIDE 0.9 %
500 INTRAVENOUS SOLUTION INTRAVENOUS ONCE
Status: COMPLETED | OUTPATIENT
Start: 2023-07-27 | End: 2023-07-27

## 2023-07-27 RX ORDER — ACETAMINOPHEN 650 MG/1
650 SUPPOSITORY RECTAL EVERY 6 HOURS PRN
Status: DISCONTINUED | OUTPATIENT
Start: 2023-07-27 | End: 2023-08-03 | Stop reason: HOSPADM

## 2023-07-27 RX ORDER — ACETAMINOPHEN 325 MG/1
650 TABLET ORAL EVERY 6 HOURS PRN
Status: DISCONTINUED | OUTPATIENT
Start: 2023-07-27 | End: 2023-08-03 | Stop reason: HOSPADM

## 2023-07-27 RX ORDER — CEPHALEXIN 125 MG/5ML
500 POWDER, FOR SUSPENSION ORAL
Status: COMPLETED | OUTPATIENT
Start: 2023-07-27 | End: 2023-07-27

## 2023-07-27 RX ORDER — ACETAMINOPHEN 160 MG/5ML
1000 SUSPENSION ORAL
Status: COMPLETED | OUTPATIENT
Start: 2023-07-27 | End: 2023-07-27

## 2023-07-27 RX ORDER — POLYETHYLENE GLYCOL 3350 17 G/17G
17 POWDER, FOR SOLUTION ORAL DAILY PRN
Status: DISCONTINUED | OUTPATIENT
Start: 2023-07-27 | End: 2023-08-03 | Stop reason: HOSPADM

## 2023-07-27 RX ORDER — HYDROXYZINE HYDROCHLORIDE 10 MG/1
10 TABLET, FILM COATED ORAL
Status: COMPLETED | OUTPATIENT
Start: 2023-07-27 | End: 2023-07-27

## 2023-07-27 RX ORDER — SODIUM CHLORIDE 0.9 % (FLUSH) 0.9 %
5-40 SYRINGE (ML) INJECTION EVERY 12 HOURS SCHEDULED
Status: DISCONTINUED | OUTPATIENT
Start: 2023-07-27 | End: 2023-08-03 | Stop reason: HOSPADM

## 2023-07-27 RX ORDER — DIAZEPAM 5 MG/ML
5 INJECTION, SOLUTION INTRAMUSCULAR; INTRAVENOUS ONCE
Status: COMPLETED | OUTPATIENT
Start: 2023-07-27 | End: 2023-07-27

## 2023-07-27 RX ORDER — ONDANSETRON 2 MG/ML
4 INJECTION INTRAMUSCULAR; INTRAVENOUS EVERY 4 HOURS PRN
Status: DISCONTINUED | OUTPATIENT
Start: 2023-07-27 | End: 2023-08-03 | Stop reason: HOSPADM

## 2023-07-27 RX ORDER — LORAZEPAM 2 MG/ML
1 INJECTION INTRAMUSCULAR ONCE
Status: COMPLETED | OUTPATIENT
Start: 2023-07-27 | End: 2023-07-27

## 2023-07-27 RX ORDER — HALOPERIDOL 5 MG/ML
5 INJECTION INTRAMUSCULAR ONCE
Status: COMPLETED | OUTPATIENT
Start: 2023-07-27 | End: 2023-07-27

## 2023-07-27 RX ORDER — CLONAZEPAM 0.5 MG/1
1 TABLET ORAL 4 TIMES DAILY
Status: DISCONTINUED | OUTPATIENT
Start: 2023-07-27 | End: 2023-08-03 | Stop reason: HOSPADM

## 2023-07-27 RX ORDER — SODIUM CHLORIDE 0.9 % (FLUSH) 0.9 %
5-40 SYRINGE (ML) INJECTION PRN
Status: DISCONTINUED | OUTPATIENT
Start: 2023-07-27 | End: 2023-08-03 | Stop reason: HOSPADM

## 2023-07-27 RX ORDER — DIAZEPAM 2.5 MG/.5ML
5 GEL RECTAL
Status: DISCONTINUED | OUTPATIENT
Start: 2023-07-27 | End: 2023-07-27

## 2023-07-27 RX ORDER — SODIUM CHLORIDE 9 MG/ML
INJECTION, SOLUTION INTRAVENOUS PRN
Status: DISCONTINUED | OUTPATIENT
Start: 2023-07-27 | End: 2023-08-03 | Stop reason: HOSPADM

## 2023-07-27 RX ORDER — SODIUM CHLORIDE 9 MG/ML
INJECTION, SOLUTION INTRAVENOUS CONTINUOUS
Status: ACTIVE | OUTPATIENT
Start: 2023-07-27 | End: 2023-07-27

## 2023-07-27 RX ORDER — ENOXAPARIN SODIUM 100 MG/ML
40 INJECTION SUBCUTANEOUS DAILY
Status: DISCONTINUED | OUTPATIENT
Start: 2023-07-27 | End: 2023-08-03 | Stop reason: HOSPADM

## 2023-07-27 RX ADMIN — SODIUM CHLORIDE: 9 INJECTION, SOLUTION INTRAVENOUS at 07:06

## 2023-07-27 RX ADMIN — ACETAMINOPHEN 1000 MG: 160 SOLUTION ORAL at 05:41

## 2023-07-27 RX ADMIN — CLONAZEPAM 1 MG: 0.5 TABLET ORAL at 17:50

## 2023-07-27 RX ADMIN — SODIUM CHLORIDE 1000 MG: 900 INJECTION INTRAVENOUS at 05:41

## 2023-07-27 RX ADMIN — SODIUM CHLORIDE, PRESERVATIVE FREE 10 ML: 5 INJECTION INTRAVENOUS at 12:05

## 2023-07-27 RX ADMIN — HYDROXYZINE HYDROCHLORIDE 10 MG: 10 TABLET ORAL at 23:21

## 2023-07-27 RX ADMIN — IBUPROFEN 800 MG: 100 SUSPENSION ORAL at 02:05

## 2023-07-27 RX ADMIN — LORAZEPAM 1 MG: 2 INJECTION INTRAMUSCULAR; INTRAVENOUS at 17:51

## 2023-07-27 RX ADMIN — HALOPERIDOL LACTATE 5 MG: 5 INJECTION, SOLUTION INTRAMUSCULAR at 04:16

## 2023-07-27 RX ADMIN — CLONAZEPAM 2 MG: 0.5 TABLET ORAL at 00:39

## 2023-07-27 RX ADMIN — SODIUM CHLORIDE, PRESERVATIVE FREE 10 ML: 5 INJECTION INTRAVENOUS at 23:14

## 2023-07-27 RX ADMIN — ACETAMINOPHEN 650 MG: 325 TABLET ORAL at 23:25

## 2023-07-27 RX ADMIN — CLONAZEPAM 1 MG: 0.5 TABLET ORAL at 20:08

## 2023-07-27 RX ADMIN — SODIUM CHLORIDE 500 ML: 9 INJECTION, SOLUTION INTRAVENOUS at 17:12

## 2023-07-27 RX ADMIN — CEPHALEXIN 500 MG: 125 FOR SUSPENSION ORAL at 02:59

## 2023-07-27 RX ADMIN — DIAZEPAM 5 MG: 5 INJECTION, SOLUTION INTRAMUSCULAR; INTRAVENOUS at 02:05

## 2023-07-27 ASSESSMENT — ENCOUNTER SYMPTOMS
NAUSEA: 0
VOMITING: 0
ABDOMINAL PAIN: 0
SHORTNESS OF BREATH: 0
DIARRHEA: 0

## 2023-07-27 ASSESSMENT — PAIN SCALES - GENERAL: PAINLEVEL_OUTOF10: 3

## 2023-07-27 NOTE — ED NOTES
Assumed care of pt at this time. Pt's primary caretaker at bedside with pt and explains pt has been w/o her medications since July 8th D/T pt's pharmacy not refilling prescription. Clonazepam trazodone are the two different medications pt is w/o. Ramiro Glez RN  07/26/23 [address], RN  07/26/23 [id number]      [id number]: This RN attempted to straight cath unsuccessfully. Md notified. Pt placed on [address] [facility], [place]  07/26/23 [id number]: Delay in cultures d/t pt status. MD notified     Ramiro lGez RN  07/27/23 [phone]: Us IV placed at this time     Ramiro Glez RN  07/27/23 [id number]: This RN unable to obtain vital signs d/t pt condition of agitation.  MD notified      Ramiro Glez RN  07/27/23 0907

## 2023-07-27 NOTE — CARE COORDINATION
Care Management Initial Assessment       RUR: 6%   Readmission? No  1st IM letter given? No  1st  letter given: No     07/27/23 3696   Service Assessment   Patient Orientation Unable to Assess   Cognition Severely Impaired   History Provided By Other (see comment)  (caregiver: Denny Irene)   Support Systems Family Members; Other (Comment)   [address] is: Named in [address] [facility]   PCP Verified by CM Yes   Last Visit to PCP Within last 3 months   Prior Functional Level Assistance with the following:;Bathing;Dressing; Toileting;Feeding;Cooking;Housework; Shopping;Mobility   Current Functional Level Assistance with the following:;Mobility; Shopping; Bathing;Dressing; Toileting;Feeding;Cooking;Housework   Can patient return to prior living arrangement Yes   Ability to make needs known: Unable   Family able to assist with home care needs: Yes   Would you like for me to discuss the discharge plan with any other family members/significant others, and if so, who? Yes   Financial Resources Medicaid   Social/Functional History   Lives With Other (comment)  (caregiver support)   Type of [address] [place] [address] bed;Lift chair   Receives Help From Family   Discharge Planning   Type of Residence House   Patient expects to be discharged to: House     CM completed room visit with pts caregiver at bedside: [facility]. Pt is known to live with caregiver. Pt is open to PCP. Pt requires total assistance with Adls, and will require transport at the time of d/c. CM will place transport on will call. Pt is known to have home TPN (continues). Pt had DME at home: ramp wheelchair, savanna lift, and hospital beds. Pt will require VAUGHN of C at the time of d/c.: [facility]. CM will enter referral at the time of d/c. CM will continue to follow.     August Marquis, MSW CHAIM  [phone]

## 2023-07-27 NOTE — ED PROVIDER NOTES
(TYLENOL) tablet 650 mg  650 mg Oral Q6H PRN Tova Conrad MD        ondansetron [facility]) injection 4 mg  4 mg IntraVENous Q4H PRN Tova Conrad MD        sodium chloride flush 0.9 % injection 5-40 mL  5-40 mL IntraVENous 2 times per day Ana Navarro MD        sodium chloride flush 0.9 % injection 5-40 mL  5-40 mL IntraVENous PRN Ana Navarro MD        0.9 % sodium chloride infusion   IntraVENous PRN Ana Navarro MD        enoxaparin (LOVENOX) injection 40 mg  40 mg SubCUTAneous Daily Ana Navarro MD        polyethylene glycol [facility]) packet 17 g  17 g Oral Daily PRN Ana Navarro MD        acetaminophen (TYLENOL) tablet 650 mg  650 mg Oral Q6H PRN Ana Nvaarro MD        Or    acetaminophen (TYLENOL) suppository 650 mg  650 mg Rectal Q6H PRN Ana Navarro MD        0.9 % sodium chloride infusion   IntraVENous Continuous Ana Navarro MD        cefTRIAXone (ROCEPHIN) 1,000 mg in sodium chloride 0.9 % 50 mL IVPB (mini-bag)  1,000 mg IntraVENous Q24H Ana Navarro MD   Stopped at 07/27/23 0608     Current Outpatient Medications   Medication Sig Dispense Refill    baclofen (LIORESAL) 20 MG tablet Take 1 mg by mouth 4 times daily      clonazePAM (KLONOPIN) 1 MG tablet Take 1 mg by mouth 4 times daily. dicyclomine (BENTYL) 20 MG tablet Take 20 mg by mouth in the morning and 20 mg at noon and 20 mg in the evening and 20 mg before bedtime.       melatonin 1 MG tablet Take 2 mg by mouth      polyethylene glycol (GLYCOLAX) 17 GM/SCOOP powder Take 17 g by mouth daily           PAST HISTORY       Past Medical History:  Past Medical History:   Diagnosis Date    Aspiration into respiratory tract     Asthma     Cerebral palsy (HCC)     Seizure ([address])     Skull fractures ([address])     @ birth       Past Surgical History:  Past Surgical History:   Procedure Laterality Date    GI      feeding tube; nipson to help prevent asipration    IR PERC REPLACE GASTROJEJUNO TUBE  9/10/2021    IR

## 2023-07-27 NOTE — H&P
Neutrophils % 69 32 - 75 %    Lymphocytes % 15 12 - 49 %    Monocytes % 14 (H) 5 - 13 %    Eosinophils % 1 0 - 7 %    Basophils % 1 0 - 1 %    Immature Granulocytes 0 0.0 - 0.5 %    Neutrophils Absolute 6.3 1.8 - 8.0 K/UL    Lymphocytes Absolute 1.3 0.8 - 3.5 K/UL    Monocytes Absolute 1.2 (H) 0.0 - 1.0 K/UL    Eosinophils Absolute 0.1 0.0 - 0.4 K/UL    Basophils Absolute 0.1 0.0 - 0.1 K/UL    Absolute Immature Granulocyte 0.0 0.00 - 0.04 K/UL    Differential Type AUTOMATED     Urinalysis with Reflex to Culture    Collection Time: 07/27/23 12:50 AM    Specimen: Urine   Result Value Ref Range    Color, UA DARK YELLOW      Appearance TURBID (A) CLEAR      Specific Gravity, UA 1.024      pH, Urine 7.5 5.0 - 8.0      Protein,  (A) NEG mg/dL    Glucose, UA Negative NEG mg/dL    Ketones, Urine 40 (A) NEG mg/dL    Bilirubin Urine Negative NEG      Blood, Urine TRACE (A) NEG      Urobilinogen, Urine 1.0 0.2 - 1.0 EU/dL    Nitrite, Urine Negative NEG      Leukocyte Esterase, Urine LARGE (A) NEG      WBC, UA >100 (H) 0 - 4 /hpf    RBC, UA 5-10 0 - 5 /hpf    Epithelial Cells UA FEW FEW /lpf    BACTERIA, URINE 4+ (A) NEG /hpf    Urine Culture if Indicated URINE CULTURE ORDERED (A) CNI      TRIPLE PHOSPHATE CRYSTALS 4+ (A) NEG   Comprehensive Metabolic Panel    Collection Time: 07/27/23  2:49 AM   Result Value Ref Range    Sodium 142 136 - 145 mmol/L    Potassium 4.5 3.5 - 5.1 mmol/L    Chloride 110 (H) 97 - 108 mmol/L    CO2 27 21 - 32 mmol/L    Anion Gap 5 5 - 15 mmol/L    Glucose 93 65 - 100 mg/dL    BUN 15 6 - 20 MG/DL    Creatinine 0.68 0.55 - 1.02 MG/DL    Bun/Cre Ratio 22 (H) 12 - 20      Est, Glom Filt Rate >60 >60 ml/min/1.73m2    Calcium 10.2 (H) 8.5 - 10.1 MG/DL    Total Bilirubin 0.9 0.2 - 1.0 MG/DL    ALT 24 12 - 78 U/L    AST 24 15 - 37 U/L    Alk Phosphatase 95 45 - 117 U/L    Total Protein 8.6 (H) 6.4 - 8.2 g/dL    Albumin 3.7 3.5 - 5.0 g/dL    Globulin 4.9 (H) 2.0 - 4.0 g/dL    Albumin/Globulin Ratio 0.8

## 2023-07-27 NOTE — ED NOTES
TRANSFER - OUT REPORT:    Verbal report given to Aleyda Soto on Grant Cody  being transferred to Oncology for routine progression of patient care       Report consisted of patient's Situation, Background, Assessment and   Recommendations(SBAR). Information from the following report(s) Nurse Handoff Report, Index, ED Encounter Summary, ED SBAR, Adult Overview, Intake/Output, MAR, Recent Results, and Cardiac Rhythm sinus tach  was reviewed with the receiving nurse. Poneto Fall Assessment:    Presents to emergency department  because of falls (Syncope, seizure, or loss of consciousness): No  Age > 70: No  Altered Mental Status, Intoxication with alcohol or substance confusion (Disorientation, impaired judgment, poor safety awaremess, or inability to follow instructions): Yes  Impaired Mobility: Ambulates or transfers with assistive devices or assistance; Unable to ambulate or transer.: Yes  Nursing Judgement: Yes          Lines:   Peripheral IV Left Hand (Active)       Peripheral IV Left Cephalic (Active)        Opportunity for questions and clarification was provided.       Patient transported with:  Alessandra Person RN  07/27/23 7817

## 2023-07-28 ENCOUNTER — APPOINTMENT (OUTPATIENT)
Facility: HOSPITAL | Age: 38
DRG: 463 | End: 2023-07-28
Payer: COMMERCIAL

## 2023-07-28 PROCEDURE — 6360000002 HC RX W HCPCS: Performed by: INTERNAL MEDICINE

## 2023-07-28 PROCEDURE — 6360000004 HC RX CONTRAST MEDICATION: Performed by: INTERNAL MEDICINE

## 2023-07-28 PROCEDURE — 6370000000 HC RX 637 (ALT 250 FOR IP): Performed by: INTERNAL MEDICINE

## 2023-07-28 PROCEDURE — 2580000003 HC RX 258: Performed by: INTERNAL MEDICINE

## 2023-07-28 PROCEDURE — 74177 CT ABD & PELVIS W/CONTRAST: CPT

## 2023-07-28 PROCEDURE — 70450 CT HEAD/BRAIN W/O DYE: CPT

## 2023-07-28 RX ORDER — PANTOPRAZOLE SODIUM 40 MG/1
40 TABLET, DELAYED RELEASE ORAL
Status: DISCONTINUED | OUTPATIENT
Start: 2023-07-29 | End: 2023-08-03 | Stop reason: HOSPADM

## 2023-07-28 RX ORDER — LANOLIN ALCOHOL/MO/W.PET/CERES
3 CREAM (GRAM) TOPICAL NIGHTLY
Status: DISCONTINUED | OUTPATIENT
Start: 2023-07-28 | End: 2023-08-01

## 2023-07-28 RX ORDER — POLYETHYLENE GLYCOL 3350 17 G/17G
17 POWDER, FOR SOLUTION ORAL DAILY
Status: DISCONTINUED | OUTPATIENT
Start: 2023-07-28 | End: 2023-08-03 | Stop reason: HOSPADM

## 2023-07-28 RX ORDER — BACLOFEN 10 MG/1
20 TABLET ORAL 3 TIMES DAILY
Status: DISCONTINUED | OUTPATIENT
Start: 2023-07-28 | End: 2023-08-03 | Stop reason: HOSPADM

## 2023-07-28 RX ORDER — LORAZEPAM 2 MG/ML
1 INJECTION INTRAMUSCULAR
Status: COMPLETED | OUTPATIENT
Start: 2023-07-28 | End: 2023-07-28

## 2023-07-28 RX ORDER — TRAZODONE HYDROCHLORIDE 100 MG/1
100 TABLET ORAL NIGHTLY
Status: DISCONTINUED | OUTPATIENT
Start: 2023-07-28 | End: 2023-08-03 | Stop reason: HOSPADM

## 2023-07-28 RX ADMIN — LORAZEPAM 1 MG: 2 INJECTION INTRAMUSCULAR; INTRAVENOUS at 16:19

## 2023-07-28 RX ADMIN — TRAZODONE HYDROCHLORIDE 100 MG: 100 TABLET ORAL at 20:40

## 2023-07-28 RX ADMIN — CLONAZEPAM 1 MG: 0.5 TABLET ORAL at 20:40

## 2023-07-28 RX ADMIN — PIPERACILLIN AND TAZOBACTAM 3375 MG: 3; .375 INJECTION, POWDER, LYOPHILIZED, FOR SOLUTION INTRAVENOUS at 22:54

## 2023-07-28 RX ADMIN — CLONAZEPAM 1 MG: 0.5 TABLET ORAL at 17:35

## 2023-07-28 RX ADMIN — IOPAMIDOL 100 ML: 755 INJECTION, SOLUTION INTRAVENOUS at 17:03

## 2023-07-28 RX ADMIN — BACLOFEN 20 MG: 10 TABLET ORAL at 20:41

## 2023-07-28 RX ADMIN — BACLOFEN 20 MG: 10 TABLET ORAL at 10:00

## 2023-07-28 RX ADMIN — ENOXAPARIN SODIUM 40 MG: 100 INJECTION SUBCUTANEOUS at 09:44

## 2023-07-28 RX ADMIN — ACETAMINOPHEN 650 MG: 325 TABLET ORAL at 20:40

## 2023-07-28 RX ADMIN — MELATONIN 3 MG: at 20:41

## 2023-07-28 RX ADMIN — ACETAMINOPHEN 650 MG: 325 TABLET ORAL at 12:36

## 2023-07-28 RX ADMIN — PIPERACILLIN AND TAZOBACTAM 3375 MG: 3; .375 INJECTION, POWDER, LYOPHILIZED, FOR SOLUTION INTRAVENOUS at 14:28

## 2023-07-28 RX ADMIN — CLONAZEPAM 1 MG: 0.5 TABLET ORAL at 12:33

## 2023-07-28 RX ADMIN — SODIUM CHLORIDE 1000 MG: 900 INJECTION INTRAVENOUS at 05:56

## 2023-07-28 RX ADMIN — CLONAZEPAM 1 MG: 0.5 TABLET ORAL at 09:44

## 2023-07-28 RX ADMIN — BACLOFEN 20 MG: 10 TABLET ORAL at 14:28

## 2023-07-28 RX ADMIN — SODIUM CHLORIDE, PRESERVATIVE FREE 10 ML: 5 INJECTION INTRAVENOUS at 22:30

## 2023-07-28 RX ADMIN — PIPERACILLIN AND TAZOBACTAM 4500 MG: 4; .5 INJECTION, POWDER, LYOPHILIZED, FOR SOLUTION INTRAVENOUS at 09:45

## 2023-07-28 RX ADMIN — SODIUM CHLORIDE, PRESERVATIVE FREE 10 ML: 5 INJECTION INTRAVENOUS at 09:59

## 2023-07-28 RX ADMIN — POLYETHYLENE GLYCOL 3350 17 G: 17 POWDER, FOR SOLUTION ORAL at 10:01

## 2023-07-28 ASSESSMENT — PAIN SCALES - GENERAL: PAINLEVEL_OUTOF10: 3

## 2023-07-28 NOTE — CARE COORDINATION
Transition of Care Plan:    RUR: 10%  Prior Level of Functioning:  Needs assistance with ADLs   Disposition: Home with family support  Follow up appointments: Follow up with PCP and/or Specialist   DME needed: Pt has access to DME   Trans[facility] at discharge: [facility]-will call   /IMM Medicare/ letter given: N/A  Is patient a  and connected with VA? N/A   If yes, was [employer] transfer form completed and VA notified? N/A  Caregiver Contact: Divya Siegel (aunt) [phone]  Discharge Caregiver contacted prior to discharge? Family to be contacted  Care Conference needed? Not at this time  Barriers to discharge: Medical Stable      CM staffed case with clinical team.  Pt will remain inpatient for 24hrs.   CM spoke with pt caregiver at bedside, and pt will require medical transport at the time of d/c.     CM placed transport as will call with [facility]-staff will enter transport time at the time of d/c    RYANN Richmond   [phone]

## 2023-07-29 LAB
ANION GAP SERPL CALC-SCNC: 5 MMOL/L (ref 5–15)
BASOPHILS # BLD: 0.1 K/UL (ref 0–0.1)
BASOPHILS NFR BLD: 1 % (ref 0–1)
BUN SERPL-MCNC: 20 MG/DL (ref 6–20)
BUN/CREAT SERPL: 40 (ref 12–20)
CALCIUM SERPL-MCNC: 9.2 MG/DL (ref 8.5–10.1)
CHLORIDE SERPL-SCNC: 119 MMOL/L (ref 97–108)
CO2 SERPL-SCNC: 26 MMOL/L (ref 21–32)
CREAT SERPL-MCNC: 0.5 MG/DL (ref 0.55–1.02)
DIFFERENTIAL METHOD BLD: NORMAL
EOSINOPHIL # BLD: 0.2 K/UL (ref 0–0.4)
EOSINOPHIL NFR BLD: 3 % (ref 0–7)
ERYTHROCYTE [DISTWIDTH] IN BLOOD BY AUTOMATED COUNT: 13.5 % (ref 11.5–14.5)
GLUCOSE SERPL-MCNC: 107 MG/DL (ref 65–100)
HCT VFR BLD AUTO: 39.8 % (ref 35–47)
HGB BLD-MCNC: 12.7 G/DL (ref 11.5–16)
IMM GRANULOCYTES # BLD AUTO: 0 K/UL (ref 0–0.04)
IMM GRANULOCYTES NFR BLD AUTO: 0 % (ref 0–0.5)
LYMPHOCYTES # BLD: 1.4 K/UL (ref 0.8–3.5)
LYMPHOCYTES NFR BLD: 19 % (ref 12–49)
MCH RBC QN AUTO: 27.7 PG (ref 26–34)
MCHC RBC AUTO-ENTMCNC: 31.9 G/DL (ref 30–36.5)
MCV RBC AUTO: 86.9 FL (ref 80–99)
MONOCYTES # BLD: 0.8 K/UL (ref 0–1)
MONOCYTES NFR BLD: 11 % (ref 5–13)
NEUTS SEG # BLD: 4.9 K/UL (ref 1.8–8)
NEUTS SEG NFR BLD: 66 % (ref 32–75)
NRBC # BLD: 0 K/UL (ref 0–0.01)
NRBC BLD-RTO: 0 PER 100 WBC
PLATELET # BLD AUTO: 276 K/UL (ref 150–400)
PMV BLD AUTO: 11.2 FL (ref 8.9–12.9)
POTASSIUM SERPL-SCNC: 4 MMOL/L (ref 3.5–5.1)
RBC # BLD AUTO: 4.58 M/UL (ref 3.8–5.2)
SODIUM SERPL-SCNC: 150 MMOL/L (ref 136–145)
WBC # BLD AUTO: 7.4 K/UL (ref 3.6–11)

## 2023-07-29 PROCEDURE — 6360000002 HC RX W HCPCS: Performed by: INTERNAL MEDICINE

## 2023-07-29 PROCEDURE — 80048 BASIC METABOLIC PNL TOTAL CA: CPT

## 2023-07-29 PROCEDURE — 6370000000 HC RX 637 (ALT 250 FOR IP): Performed by: INTERNAL MEDICINE

## 2023-07-29 PROCEDURE — 6360000002 HC RX W HCPCS

## 2023-07-29 PROCEDURE — 36415 COLL VENOUS BLD VENIPUNCTURE: CPT

## 2023-07-29 PROCEDURE — 2580000003 HC RX 258: Performed by: INTERNAL MEDICINE

## 2023-07-29 PROCEDURE — 85025 COMPLETE CBC W/AUTO DIFF WBC: CPT

## 2023-07-29 PROCEDURE — 2580000003 HC RX 258

## 2023-07-29 PROCEDURE — 6370000000 HC RX 637 (ALT 250 FOR IP)

## 2023-07-29 PROCEDURE — 83970 ASSAY OF PARATHORMONE: CPT

## 2023-07-29 RX ORDER — KETOROLAC TROMETHAMINE 30 MG/ML
30 INJECTION, SOLUTION INTRAMUSCULAR; INTRAVENOUS ONCE
Status: COMPLETED | OUTPATIENT
Start: 2023-07-29 | End: 2023-07-29

## 2023-07-29 RX ORDER — OXYCODONE HYDROCHLORIDE AND ACETAMINOPHEN 5; 325 MG/1; MG/1
1 TABLET ORAL EVERY 4 HOURS PRN
Status: DISCONTINUED | OUTPATIENT
Start: 2023-07-29 | End: 2023-08-03 | Stop reason: HOSPADM

## 2023-07-29 RX ORDER — SODIUM CHLORIDE 9 MG/ML
INJECTION, SOLUTION INTRAVENOUS CONTINUOUS
Status: ACTIVE | OUTPATIENT
Start: 2023-07-29 | End: 2023-07-30

## 2023-07-29 RX ADMIN — PIPERACILLIN AND TAZOBACTAM 3375 MG: 3; .375 INJECTION, POWDER, LYOPHILIZED, FOR SOLUTION INTRAVENOUS at 22:52

## 2023-07-29 RX ADMIN — OXYCODONE HYDROCHLORIDE AND ACETAMINOPHEN 1 TABLET: 5; 325 TABLET ORAL at 18:05

## 2023-07-29 RX ADMIN — CLONAZEPAM 1 MG: 0.5 TABLET ORAL at 10:48

## 2023-07-29 RX ADMIN — SODIUM CHLORIDE, PRESERVATIVE FREE 10 ML: 5 INJECTION INTRAVENOUS at 10:50

## 2023-07-29 RX ADMIN — SODIUM CHLORIDE: 9 INJECTION, SOLUTION INTRAVENOUS at 18:05

## 2023-07-29 RX ADMIN — TRAZODONE HYDROCHLORIDE 100 MG: 100 TABLET ORAL at 20:48

## 2023-07-29 RX ADMIN — PANTOPRAZOLE SODIUM 40 MG: 40 TABLET, DELAYED RELEASE ORAL at 10:48

## 2023-07-29 RX ADMIN — MELATONIN 3 MG: at 20:48

## 2023-07-29 RX ADMIN — CLONAZEPAM 1 MG: 0.5 TABLET ORAL at 18:05

## 2023-07-29 RX ADMIN — CLONAZEPAM 1 MG: 0.5 TABLET ORAL at 14:08

## 2023-07-29 RX ADMIN — BACLOFEN 20 MG: 10 TABLET ORAL at 20:48

## 2023-07-29 RX ADMIN — BACLOFEN 20 MG: 10 TABLET ORAL at 10:48

## 2023-07-29 RX ADMIN — ACETAMINOPHEN 650 MG: 325 TABLET ORAL at 14:07

## 2023-07-29 RX ADMIN — KETOROLAC TROMETHAMINE 30 MG: 30 INJECTION, SOLUTION INTRAMUSCULAR; INTRAVENOUS at 14:56

## 2023-07-29 RX ADMIN — PIPERACILLIN AND TAZOBACTAM 3375 MG: 3; .375 INJECTION, POWDER, LYOPHILIZED, FOR SOLUTION INTRAVENOUS at 05:56

## 2023-07-29 RX ADMIN — CLONAZEPAM 1 MG: 0.5 TABLET ORAL at 20:48

## 2023-07-29 RX ADMIN — SODIUM CHLORIDE, PRESERVATIVE FREE 10 ML: 5 INJECTION INTRAVENOUS at 20:59

## 2023-07-29 RX ADMIN — BACLOFEN 20 MG: 10 TABLET ORAL at 14:08

## 2023-07-29 RX ADMIN — PIPERACILLIN AND TAZOBACTAM 3375 MG: 3; .375 INJECTION, POWDER, LYOPHILIZED, FOR SOLUTION INTRAVENOUS at 14:08

## 2023-07-29 ASSESSMENT — PAIN SCALES - GENERAL
PAINLEVEL_OUTOF10: 8
PAINLEVEL_OUTOF10: 5
PAINLEVEL_OUTOF10: 8
PAINLEVEL_OUTOF10: 6
PAINLEVEL_OUTOF10: 7
PAINLEVEL_OUTOF10: 5

## 2023-07-30 LAB
ANION GAP SERPL CALC-SCNC: 2 MMOL/L (ref 5–15)
APPEARANCE UR: CLEAR
BACTERIA SPEC CULT: ABNORMAL
BACTERIA SPEC CULT: ABNORMAL
BACTERIA URNS QL MICRO: ABNORMAL /HPF
BASOPHILS # BLD: 0 K/UL (ref 0–0.1)
BASOPHILS NFR BLD: 1 % (ref 0–1)
BILIRUB UR QL: NEGATIVE
BUN SERPL-MCNC: 23 MG/DL (ref 6–20)
BUN/CREAT SERPL: 53 (ref 12–20)
CALCIUM SERPL-MCNC: 9.1 MG/DL (ref 8.5–10.1)
CALCIUM SERPL-MCNC: 9.3 MG/DL (ref 8.5–10.1)
CC UR VC: ABNORMAL
CHLORIDE SERPL-SCNC: 118 MMOL/L (ref 97–108)
CK SERPL-CCNC: 191 U/L (ref 26–192)
CO2 SERPL-SCNC: 29 MMOL/L (ref 21–32)
COLOR UR: ABNORMAL
CREAT SERPL-MCNC: 0.43 MG/DL (ref 0.55–1.02)
DIFFERENTIAL METHOD BLD: NORMAL
EOSINOPHIL # BLD: 0.3 K/UL (ref 0–0.4)
EOSINOPHIL NFR BLD: 7 % (ref 0–7)
EPITH CASTS URNS QL MICRO: ABNORMAL /LPF
ERYTHROCYTE [DISTWIDTH] IN BLOOD BY AUTOMATED COUNT: 13.6 % (ref 11.5–14.5)
GLUCOSE SERPL-MCNC: 100 MG/DL (ref 65–100)
GLUCOSE UR STRIP.AUTO-MCNC: NEGATIVE MG/DL
HCT VFR BLD AUTO: 37.4 % (ref 35–47)
HGB BLD-MCNC: 12.1 G/DL (ref 11.5–16)
HGB UR QL STRIP: NEGATIVE
IMM GRANULOCYTES # BLD AUTO: 0 K/UL (ref 0–0.04)
IMM GRANULOCYTES NFR BLD AUTO: 0 % (ref 0–0.5)
KETONES UR QL STRIP.AUTO: ABNORMAL MG/DL
LEUKOCYTE ESTERASE UR QL STRIP.AUTO: ABNORMAL
LYMPHOCYTES # BLD: 1.1 K/UL (ref 0.8–3.5)
LYMPHOCYTES NFR BLD: 25 % (ref 12–49)
MCH RBC QN AUTO: 28.3 PG (ref 26–34)
MCHC RBC AUTO-ENTMCNC: 32.4 G/DL (ref 30–36.5)
MCV RBC AUTO: 87.4 FL (ref 80–99)
MONOCYTES # BLD: 0.5 K/UL (ref 0–1)
MONOCYTES NFR BLD: 12 % (ref 5–13)
NEUTS SEG # BLD: 2.3 K/UL (ref 1.8–8)
NEUTS SEG NFR BLD: 55 % (ref 32–75)
NITRITE UR QL STRIP.AUTO: NEGATIVE
NRBC # BLD: 0 K/UL (ref 0–0.01)
NRBC BLD-RTO: 0 PER 100 WBC
NT PRO BNP: 26 PG/ML
PH UR STRIP: 6.5 (ref 5–8)
PLATELET # BLD AUTO: 292 K/UL (ref 150–400)
PMV BLD AUTO: 11.3 FL (ref 8.9–12.9)
POTASSIUM SERPL-SCNC: 3.9 MMOL/L (ref 3.5–5.1)
PROT UR STRIP-MCNC: 100 MG/DL
PTH-INTACT SERPL-MCNC: 57.3 PG/ML (ref 18.4–88)
RBC # BLD AUTO: 4.28 M/UL (ref 3.8–5.2)
RBC #/AREA URNS HPF: ABNORMAL /HPF (ref 0–5)
SERVICE CMNT-IMP: ABNORMAL
SODIUM SERPL-SCNC: 149 MMOL/L (ref 136–145)
SP GR UR REFRACTOMETRY: 1.02 (ref 1–1.03)
URINE CULTURE IF INDICATED: ABNORMAL
UROBILINOGEN UR QL STRIP.AUTO: 1 EU/DL (ref 0.2–1)
WBC # BLD AUTO: 4.2 K/UL (ref 3.6–11)
WBC URNS QL MICRO: ABNORMAL /HPF (ref 0–4)

## 2023-07-30 PROCEDURE — 6370000000 HC RX 637 (ALT 250 FOR IP)

## 2023-07-30 PROCEDURE — 83880 ASSAY OF NATRIURETIC PEPTIDE: CPT

## 2023-07-30 PROCEDURE — 85025 COMPLETE CBC W/AUTO DIFF WBC: CPT

## 2023-07-30 PROCEDURE — 2580000003 HC RX 258

## 2023-07-30 PROCEDURE — 80048 BASIC METABOLIC PNL TOTAL CA: CPT

## 2023-07-30 PROCEDURE — 36415 COLL VENOUS BLD VENIPUNCTURE: CPT

## 2023-07-30 PROCEDURE — 82550 ASSAY OF CK (CPK): CPT

## 2023-07-30 PROCEDURE — 81001 URINALYSIS AUTO W/SCOPE: CPT

## 2023-07-30 PROCEDURE — 87086 URINE CULTURE/COLONY COUNT: CPT

## 2023-07-30 PROCEDURE — 6360000002 HC RX W HCPCS: Performed by: INTERNAL MEDICINE

## 2023-07-30 PROCEDURE — 2580000003 HC RX 258: Performed by: INTERNAL MEDICINE

## 2023-07-30 PROCEDURE — 6370000000 HC RX 637 (ALT 250 FOR IP): Performed by: INTERNAL MEDICINE

## 2023-07-30 RX ORDER — SODIUM CHLORIDE 9 MG/ML
INJECTION, SOLUTION INTRAVENOUS CONTINUOUS
Status: ACTIVE | OUTPATIENT
Start: 2023-07-30 | End: 2023-07-31

## 2023-07-30 RX ORDER — LIDOCAINE 4 G/G
1 PATCH TOPICAL DAILY
Status: DISCONTINUED | OUTPATIENT
Start: 2023-07-30 | End: 2023-08-03 | Stop reason: HOSPADM

## 2023-07-30 RX ORDER — OXYCODONE HCL 5 MG/5 ML
5 SOLUTION, ORAL ORAL EVERY 6 HOURS PRN
Status: DISCONTINUED | OUTPATIENT
Start: 2023-07-30 | End: 2023-08-03 | Stop reason: HOSPADM

## 2023-07-30 RX ADMIN — BACLOFEN 20 MG: 10 TABLET ORAL at 21:28

## 2023-07-30 RX ADMIN — PANTOPRAZOLE SODIUM 40 MG: 40 TABLET, DELAYED RELEASE ORAL at 09:52

## 2023-07-30 RX ADMIN — CLONAZEPAM 1 MG: 0.5 TABLET ORAL at 09:52

## 2023-07-30 RX ADMIN — OXYCODONE HYDROCHLORIDE AND ACETAMINOPHEN 1 TABLET: 5; 325 TABLET ORAL at 13:33

## 2023-07-30 RX ADMIN — BACLOFEN 20 MG: 10 TABLET ORAL at 13:10

## 2023-07-30 RX ADMIN — OXYCODONE HYDROCHLORIDE AND ACETAMINOPHEN 1 TABLET: 5; 325 TABLET ORAL at 09:52

## 2023-07-30 RX ADMIN — SODIUM CHLORIDE: 9 INJECTION, SOLUTION INTRAVENOUS at 15:59

## 2023-07-30 RX ADMIN — CLONAZEPAM 1 MG: 0.5 TABLET ORAL at 13:10

## 2023-07-30 RX ADMIN — BACLOFEN 20 MG: 10 TABLET ORAL at 09:52

## 2023-07-30 RX ADMIN — TRAZODONE HYDROCHLORIDE 100 MG: 100 TABLET ORAL at 21:27

## 2023-07-30 RX ADMIN — MELATONIN 3 MG: at 21:28

## 2023-07-30 RX ADMIN — SODIUM CHLORIDE, PRESERVATIVE FREE 10 ML: 5 INJECTION INTRAVENOUS at 09:53

## 2023-07-30 RX ADMIN — OXYCODONE HYDROCHLORIDE 5 MG: 5 SOLUTION ORAL at 18:27

## 2023-07-30 RX ADMIN — PIPERACILLIN AND TAZOBACTAM 3375 MG: 3; .375 INJECTION, POWDER, LYOPHILIZED, FOR SOLUTION INTRAVENOUS at 23:09

## 2023-07-30 RX ADMIN — CLONAZEPAM 1 MG: 0.5 TABLET ORAL at 21:27

## 2023-07-30 RX ADMIN — ENOXAPARIN SODIUM 40 MG: 100 INJECTION SUBCUTANEOUS at 09:53

## 2023-07-30 RX ADMIN — PIPERACILLIN AND TAZOBACTAM 3375 MG: 3; .375 INJECTION, POWDER, LYOPHILIZED, FOR SOLUTION INTRAVENOUS at 05:53

## 2023-07-30 RX ADMIN — CLONAZEPAM 1 MG: 0.5 TABLET ORAL at 18:27

## 2023-07-30 RX ADMIN — OXYCODONE HYDROCHLORIDE AND ACETAMINOPHEN 1 TABLET: 5; 325 TABLET ORAL at 21:27

## 2023-07-30 RX ADMIN — PIPERACILLIN AND TAZOBACTAM 3375 MG: 3; .375 INJECTION, POWDER, LYOPHILIZED, FOR SOLUTION INTRAVENOUS at 14:56

## 2023-07-30 ASSESSMENT — PAIN SCALES - GENERAL
PAINLEVEL_OUTOF10: 5
PAINLEVEL_OUTOF10: 3
PAINLEVEL_OUTOF10: 0
PAINLEVEL_OUTOF10: 0
PAINLEVEL_OUTOF10: 5
PAINLEVEL_OUTOF10: 5

## 2023-07-31 ENCOUNTER — APPOINTMENT (OUTPATIENT)
Facility: HOSPITAL | Age: 38
DRG: 463 | End: 2023-07-31
Payer: COMMERCIAL

## 2023-07-31 LAB
ANION GAP SERPL CALC-SCNC: 3 MMOL/L (ref 5–15)
BACTERIA SPEC CULT: NORMAL
BASOPHILS # BLD: 0 K/UL (ref 0–0.1)
BASOPHILS NFR BLD: 1 % (ref 0–1)
BUN SERPL-MCNC: 19 MG/DL (ref 6–20)
BUN/CREAT SERPL: 41 (ref 12–20)
CALCIUM SERPL-MCNC: 8.9 MG/DL (ref 8.5–10.1)
CHLORIDE SERPL-SCNC: 116 MMOL/L (ref 97–108)
CO2 SERPL-SCNC: 27 MMOL/L (ref 21–32)
CREAT SERPL-MCNC: 0.46 MG/DL (ref 0.55–1.02)
DIFFERENTIAL METHOD BLD: NORMAL
EOSINOPHIL # BLD: 0.2 K/UL (ref 0–0.4)
EOSINOPHIL NFR BLD: 4 % (ref 0–7)
ERYTHROCYTE [DISTWIDTH] IN BLOOD BY AUTOMATED COUNT: 13.4 % (ref 11.5–14.5)
GLUCOSE SERPL-MCNC: 97 MG/DL (ref 65–100)
HCT VFR BLD AUTO: 38.7 % (ref 35–47)
HGB BLD-MCNC: 12.2 G/DL (ref 11.5–16)
IMM GRANULOCYTES # BLD AUTO: 0 K/UL (ref 0–0.04)
IMM GRANULOCYTES NFR BLD AUTO: 0 % (ref 0–0.5)
LYMPHOCYTES # BLD: 1.2 K/UL (ref 0.8–3.5)
LYMPHOCYTES NFR BLD: 26 % (ref 12–49)
MCH RBC QN AUTO: 28 PG (ref 26–34)
MCHC RBC AUTO-ENTMCNC: 31.5 G/DL (ref 30–36.5)
MCV RBC AUTO: 88.8 FL (ref 80–99)
MONOCYTES # BLD: 0.5 K/UL (ref 0–1)
MONOCYTES NFR BLD: 10 % (ref 5–13)
NEUTS SEG # BLD: 2.7 K/UL (ref 1.8–8)
NEUTS SEG NFR BLD: 59 % (ref 32–75)
NRBC # BLD: 0 K/UL (ref 0–0.01)
NRBC BLD-RTO: 0 PER 100 WBC
PLATELET # BLD AUTO: 280 K/UL (ref 150–400)
PMV BLD AUTO: 11.2 FL (ref 8.9–12.9)
POTASSIUM SERPL-SCNC: 4.4 MMOL/L (ref 3.5–5.1)
RBC # BLD AUTO: 4.36 M/UL (ref 3.8–5.2)
SERVICE CMNT-IMP: NORMAL
SODIUM SERPL-SCNC: 146 MMOL/L (ref 136–145)
WBC # BLD AUTO: 4.5 K/UL (ref 3.6–11)

## 2023-07-31 PROCEDURE — 6360000002 HC RX W HCPCS: Performed by: INTERNAL MEDICINE

## 2023-07-31 PROCEDURE — 6370000000 HC RX 637 (ALT 250 FOR IP)

## 2023-07-31 PROCEDURE — 2580000003 HC RX 258: Performed by: INTERNAL MEDICINE

## 2023-07-31 PROCEDURE — 6370000000 HC RX 637 (ALT 250 FOR IP): Performed by: INTERNAL MEDICINE

## 2023-07-31 PROCEDURE — 80048 BASIC METABOLIC PNL TOTAL CA: CPT

## 2023-07-31 PROCEDURE — 36415 COLL VENOUS BLD VENIPUNCTURE: CPT

## 2023-07-31 PROCEDURE — 85025 COMPLETE CBC W/AUTO DIFF WBC: CPT

## 2023-07-31 RX ADMIN — CLONAZEPAM 1 MG: 0.5 TABLET ORAL at 21:44

## 2023-07-31 RX ADMIN — BACLOFEN 20 MG: 10 TABLET ORAL at 13:37

## 2023-07-31 RX ADMIN — PIPERACILLIN AND TAZOBACTAM 3375 MG: 3; .375 INJECTION, POWDER, LYOPHILIZED, FOR SOLUTION INTRAVENOUS at 15:17

## 2023-07-31 RX ADMIN — OXYCODONE HYDROCHLORIDE AND ACETAMINOPHEN 1 TABLET: 5; 325 TABLET ORAL at 11:36

## 2023-07-31 RX ADMIN — PIPERACILLIN AND TAZOBACTAM 3375 MG: 3; .375 INJECTION, POWDER, LYOPHILIZED, FOR SOLUTION INTRAVENOUS at 21:47

## 2023-07-31 RX ADMIN — BACLOFEN 20 MG: 10 TABLET ORAL at 21:45

## 2023-07-31 RX ADMIN — CLONAZEPAM 1 MG: 0.5 TABLET ORAL at 13:38

## 2023-07-31 RX ADMIN — OXYCODONE HYDROCHLORIDE AND ACETAMINOPHEN 1 TABLET: 5; 325 TABLET ORAL at 18:54

## 2023-07-31 RX ADMIN — BACLOFEN 20 MG: 10 TABLET ORAL at 10:28

## 2023-07-31 RX ADMIN — PIPERACILLIN AND TAZOBACTAM 3375 MG: 3; .375 INJECTION, POWDER, LYOPHILIZED, FOR SOLUTION INTRAVENOUS at 06:01

## 2023-07-31 RX ADMIN — PANTOPRAZOLE SODIUM 40 MG: 40 TABLET, DELAYED RELEASE ORAL at 10:29

## 2023-07-31 RX ADMIN — MELATONIN 3 MG: at 21:43

## 2023-07-31 RX ADMIN — ENOXAPARIN SODIUM 40 MG: 100 INJECTION SUBCUTANEOUS at 10:28

## 2023-07-31 RX ADMIN — CLONAZEPAM 1 MG: 0.5 TABLET ORAL at 10:28

## 2023-07-31 RX ADMIN — SODIUM CHLORIDE, PRESERVATIVE FREE 10 ML: 5 INJECTION INTRAVENOUS at 10:27

## 2023-07-31 RX ADMIN — CLONAZEPAM 1 MG: 0.5 TABLET ORAL at 17:28

## 2023-07-31 RX ADMIN — TRAZODONE HYDROCHLORIDE 100 MG: 100 TABLET ORAL at 21:44

## 2023-07-31 ASSESSMENT — PAIN SCALES - GENERAL: PAINLEVEL_OUTOF10: 8

## 2023-07-31 NOTE — CARE COORDINATION
Transition of Care Plan:    RUR: 10%  Prior Level of Functioning:  Needs assistance with ADLs   Disposition: Home with family support  Follow up appointments: Follow up with PCP and/or Specialist   DME needed: Pt has access to DME   Transportation at discharge: [facility]-will call   /IMM Medicare/ letter given: N/A  Is patient a  and connected with VA? N/A   If yes, was [employer] transfer form completed and VA notified? N/A  Caregiver Contact: Akiko Martinez (aunt) [phone]  Discharge Caregiver contacted prior to discharge? Family to be contacted  Care Conference needed? Not at this time  Barriers to discharge: Medical Stable      CM staffed case with clinical team.  CM made aware that echo is pending for pt. Pt will be ready to d/c within 24hrs. Pt transport on will call. CM will continue to follow.     RYANN Crowell CM  [phone]

## 2023-08-01 ENCOUNTER — APPOINTMENT (OUTPATIENT)
Facility: HOSPITAL | Age: 38
DRG: 463 | End: 2023-08-01
Payer: COMMERCIAL

## 2023-08-01 LAB
ANION GAP SERPL CALC-SCNC: 4 MMOL/L (ref 5–15)
APPEARANCE UR: CLEAR
BACTERIA URNS QL MICRO: ABNORMAL /HPF
BASOPHILS # BLD: 0 K/UL (ref 0–0.1)
BASOPHILS NFR BLD: 1 % (ref 0–1)
BILIRUB UR QL: NEGATIVE
BUN SERPL-MCNC: 20 MG/DL (ref 6–20)
BUN/CREAT SERPL: 44 (ref 12–20)
CALCIUM SERPL-MCNC: 9 MG/DL (ref 8.5–10.1)
CAOX CRY URNS QL MICRO: ABNORMAL
CHLORIDE SERPL-SCNC: 113 MMOL/L (ref 97–108)
CO2 SERPL-SCNC: 26 MMOL/L (ref 21–32)
COLOR UR: ABNORMAL
CREAT SERPL-MCNC: 0.45 MG/DL (ref 0.55–1.02)
DIFFERENTIAL METHOD BLD: ABNORMAL
ECHO AV MEAN GRADIENT: 3 MMHG
ECHO AV MEAN VELOCITY: 0.8 M/S
ECHO AV PEAK GRADIENT: 0 MMHG
ECHO AV PEAK GRADIENT: 6 MMHG
ECHO AV PEAK VELOCITY: 0.1 M/S
ECHO AV PEAK VELOCITY: 1.3 M/S
ECHO AV VTI: 20.5 CM
ECHO BSA: 1.86 M2
ECHO LA DIAMETER INDEX: 1.73 CM/M2
ECHO LA DIAMETER: 3.2 CM
ECHO LV EDV A4C: 67 ML
ECHO LV EDV INDEX A4C: 36 ML/M2
ECHO LV EJECTION FRACTION A4C: 78 %
ECHO LV ESV A4C: 15 ML
ECHO LV ESV INDEX A4C: 8 ML/M2
ECHO LV FRACTIONAL SHORTENING: 36 % (ref 28–44)
ECHO LV INTERNAL DIMENSION DIASTOLE INDEX: 2.11 CM/M2
ECHO LV INTERNAL DIMENSION DIASTOLIC: 3.9 CM (ref 3.9–5.3)
ECHO LV INTERNAL DIMENSION SYSTOLIC INDEX: 1.35 CM/M2
ECHO LV INTERNAL DIMENSION SYSTOLIC: 2.5 CM
ECHO LV IVSD: 0.8 CM (ref 0.6–0.9)
ECHO LV MASS 2D: 82.3 G (ref 67–162)
ECHO LV MASS INDEX 2D: 44.5 G/M2 (ref 43–95)
ECHO LV POSTERIOR WALL DIASTOLIC: 0.7 CM (ref 0.6–0.9)
ECHO LV RELATIVE WALL THICKNESS RATIO: 0.36
ECHO LVOT AREA: 2.8 CM2
ECHO LVOT DIAM: 1.9 CM
ECHO MV MAX VELOCITY: 0.7 M/S
ECHO MV MEAN GRADIENT: 1 MMHG
ECHO MV MEAN VELOCITY: 0.5 M/S
ECHO MV PEAK GRADIENT: 2 MMHG
ECHO MV VTI: 20.6 CM
EOSINOPHIL # BLD: 0.1 K/UL (ref 0–0.4)
EOSINOPHIL NFR BLD: 5 % (ref 0–7)
EPITH CASTS URNS QL MICRO: ABNORMAL /LPF
ERYTHROCYTE [DISTWIDTH] IN BLOOD BY AUTOMATED COUNT: 13.2 % (ref 11.5–14.5)
GLUCOSE SERPL-MCNC: 96 MG/DL (ref 65–100)
GLUCOSE UR STRIP.AUTO-MCNC: NEGATIVE MG/DL
HCT VFR BLD AUTO: 39.4 % (ref 35–47)
HGB BLD-MCNC: 12.4 G/DL (ref 11.5–16)
HGB UR QL STRIP: NEGATIVE
IMM GRANULOCYTES # BLD AUTO: 0 K/UL (ref 0–0.04)
IMM GRANULOCYTES NFR BLD AUTO: 0 % (ref 0–0.5)
KETONES UR QL STRIP.AUTO: NEGATIVE MG/DL
LEUKOCYTE ESTERASE UR QL STRIP.AUTO: ABNORMAL
LYMPHOCYTES # BLD: 1.4 K/UL (ref 0.8–3.5)
LYMPHOCYTES NFR BLD: 43 % (ref 12–49)
MCH RBC QN AUTO: 28.1 PG (ref 26–34)
MCHC RBC AUTO-ENTMCNC: 31.5 G/DL (ref 30–36.5)
MCV RBC AUTO: 89.1 FL (ref 80–99)
MONOCYTES # BLD: 0.5 K/UL (ref 0–1)
MONOCYTES NFR BLD: 16 % (ref 5–13)
NEUTS SEG # BLD: 1.1 K/UL (ref 1.8–8)
NEUTS SEG NFR BLD: 35 % (ref 32–75)
NITRITE UR QL STRIP.AUTO: NEGATIVE
NRBC # BLD: 0 K/UL (ref 0–0.01)
NRBC BLD-RTO: 0 PER 100 WBC
PH UR STRIP: 6.5 (ref 5–8)
PLATELET # BLD AUTO: 270 K/UL (ref 150–400)
PMV BLD AUTO: 11.6 FL (ref 8.9–12.9)
POTASSIUM SERPL-SCNC: 3.9 MMOL/L (ref 3.5–5.1)
PROT UR STRIP-MCNC: ABNORMAL MG/DL
RBC # BLD AUTO: 4.42 M/UL (ref 3.8–5.2)
RBC #/AREA URNS HPF: ABNORMAL /HPF (ref 0–5)
SODIUM SERPL-SCNC: 143 MMOL/L (ref 136–145)
SP GR UR REFRACTOMETRY: 1.03
UROBILINOGEN UR QL STRIP.AUTO: 0.2 EU/DL (ref 0.2–1)
WBC # BLD AUTO: 3.1 K/UL (ref 3.6–11)
WBC URNS QL MICRO: ABNORMAL /HPF (ref 0–4)
YEAST BUDDING URNS QL: PRESENT

## 2023-08-01 PROCEDURE — 6370000000 HC RX 637 (ALT 250 FOR IP): Performed by: NURSE PRACTITIONER

## 2023-08-01 PROCEDURE — 6370000000 HC RX 637 (ALT 250 FOR IP)

## 2023-08-01 PROCEDURE — 36415 COLL VENOUS BLD VENIPUNCTURE: CPT

## 2023-08-01 PROCEDURE — 2580000003 HC RX 258: Performed by: INTERNAL MEDICINE

## 2023-08-01 PROCEDURE — 6370000000 HC RX 637 (ALT 250 FOR IP): Performed by: INTERNAL MEDICINE

## 2023-08-01 PROCEDURE — 6360000002 HC RX W HCPCS: Performed by: INTERNAL MEDICINE

## 2023-08-01 PROCEDURE — 6360000002 HC RX W HCPCS: Performed by: EMERGENCY MEDICINE

## 2023-08-01 PROCEDURE — 93308 TTE F-UP OR LMTD: CPT

## 2023-08-01 PROCEDURE — 81001 URINALYSIS AUTO W/SCOPE: CPT

## 2023-08-01 PROCEDURE — 80048 BASIC METABOLIC PNL TOTAL CA: CPT

## 2023-08-01 PROCEDURE — 85025 COMPLETE CBC W/AUTO DIFF WBC: CPT

## 2023-08-01 RX ORDER — LANOLIN ALCOHOL/MO/W.PET/CERES
6 CREAM (GRAM) TOPICAL NIGHTLY
Status: DISCONTINUED | OUTPATIENT
Start: 2023-08-01 | End: 2023-08-03 | Stop reason: HOSPADM

## 2023-08-01 RX ADMIN — OXYCODONE HYDROCHLORIDE AND ACETAMINOPHEN 1 TABLET: 5; 325 TABLET ORAL at 12:47

## 2023-08-01 RX ADMIN — CLONAZEPAM 1 MG: 0.5 TABLET ORAL at 20:44

## 2023-08-01 RX ADMIN — CLONAZEPAM 1 MG: 0.5 TABLET ORAL at 17:37

## 2023-08-01 RX ADMIN — SODIUM CHLORIDE, PRESERVATIVE FREE 10 ML: 5 INJECTION INTRAVENOUS at 01:20

## 2023-08-01 RX ADMIN — OXYCODONE HYDROCHLORIDE AND ACETAMINOPHEN 1 TABLET: 5; 325 TABLET ORAL at 20:45

## 2023-08-01 RX ADMIN — TRAZODONE HYDROCHLORIDE 100 MG: 100 TABLET ORAL at 20:45

## 2023-08-01 RX ADMIN — PIPERACILLIN AND TAZOBACTAM 3375 MG: 3; .375 INJECTION, POWDER, LYOPHILIZED, FOR SOLUTION INTRAVENOUS at 05:40

## 2023-08-01 RX ADMIN — SODIUM CHLORIDE, PRESERVATIVE FREE 10 ML: 5 INJECTION INTRAVENOUS at 09:01

## 2023-08-01 RX ADMIN — ENOXAPARIN SODIUM 40 MG: 100 INJECTION SUBCUTANEOUS at 09:00

## 2023-08-01 RX ADMIN — PIPERACILLIN AND TAZOBACTAM 3375 MG: 3; .375 INJECTION, POWDER, LYOPHILIZED, FOR SOLUTION INTRAVENOUS at 20:46

## 2023-08-01 RX ADMIN — PANTOPRAZOLE SODIUM 40 MG: 40 TABLET, DELAYED RELEASE ORAL at 05:40

## 2023-08-01 RX ADMIN — BACLOFEN 20 MG: 10 TABLET ORAL at 09:00

## 2023-08-01 RX ADMIN — SODIUM CHLORIDE, PRESERVATIVE FREE 10 ML: 5 INJECTION INTRAVENOUS at 22:17

## 2023-08-01 RX ADMIN — ONDANSETRON 4 MG: 2 INJECTION INTRAMUSCULAR; INTRAVENOUS at 21:13

## 2023-08-01 RX ADMIN — BACLOFEN 20 MG: 10 TABLET ORAL at 14:05

## 2023-08-01 RX ADMIN — BACLOFEN 20 MG: 10 TABLET ORAL at 20:45

## 2023-08-01 RX ADMIN — PIPERACILLIN AND TAZOBACTAM 3375 MG: 3; .375 INJECTION, POWDER, LYOPHILIZED, FOR SOLUTION INTRAVENOUS at 14:04

## 2023-08-01 RX ADMIN — CLONAZEPAM 1 MG: 0.5 TABLET ORAL at 14:05

## 2023-08-01 RX ADMIN — MELATONIN 6 MG: at 20:45

## 2023-08-01 RX ADMIN — OXYCODONE HYDROCHLORIDE AND ACETAMINOPHEN 1 TABLET: 5; 325 TABLET ORAL at 05:11

## 2023-08-01 RX ADMIN — CLONAZEPAM 1 MG: 0.5 TABLET ORAL at 09:00

## 2023-08-01 ASSESSMENT — PAIN SCALES - WONG BAKER: WONGBAKER_NUMERICALRESPONSE: 4

## 2023-08-01 ASSESSMENT — PAIN SCALES - GENERAL
PAINLEVEL_OUTOF10: 0
PAINLEVEL_OUTOF10: 5

## 2023-08-02 LAB
ANION GAP SERPL CALC-SCNC: 2 MMOL/L (ref 5–15)
BACTERIA SPEC CULT: NORMAL
BACTERIA SPEC CULT: NORMAL
BASOPHILS # BLD: 0 K/UL (ref 0–0.1)
BASOPHILS NFR BLD: 1 % (ref 0–1)
BUN SERPL-MCNC: 17 MG/DL (ref 6–20)
BUN/CREAT SERPL: 33 (ref 12–20)
CALCIUM SERPL-MCNC: 9.5 MG/DL (ref 8.5–10.1)
CHLORIDE SERPL-SCNC: 108 MMOL/L (ref 97–108)
CO2 SERPL-SCNC: 29 MMOL/L (ref 21–32)
CREAT SERPL-MCNC: 0.51 MG/DL (ref 0.55–1.02)
CREAT UR-MCNC: 80.8 MG/DL
DIFFERENTIAL METHOD BLD: ABNORMAL
EOSINOPHIL # BLD: 0.1 K/UL (ref 0–0.4)
EOSINOPHIL NFR BLD: 3 % (ref 0–7)
ERYTHROCYTE [DISTWIDTH] IN BLOOD BY AUTOMATED COUNT: 13 % (ref 11.5–14.5)
ERYTHROCYTE [SEDIMENTATION RATE] IN BLOOD: 28 MM/HR (ref 0–20)
GLUCOSE SERPL-MCNC: 96 MG/DL (ref 65–100)
HBV SURFACE AB SER QL: REACTIVE
HBV SURFACE AB SER-ACNC: 25.82 MIU/ML
HCT VFR BLD AUTO: 40.4 % (ref 35–47)
HCV AB SERPL QL IA: NONREACTIVE
HGB BLD-MCNC: 13 G/DL (ref 11.5–16)
IMM GRANULOCYTES # BLD AUTO: 0 K/UL (ref 0–0.04)
IMM GRANULOCYTES NFR BLD AUTO: 0 % (ref 0–0.5)
LYMPHOCYTES # BLD: 1.4 K/UL (ref 0.8–3.5)
LYMPHOCYTES NFR BLD: 45 % (ref 12–49)
MCH RBC QN AUTO: 28.4 PG (ref 26–34)
MCHC RBC AUTO-ENTMCNC: 32.2 G/DL (ref 30–36.5)
MCV RBC AUTO: 88.4 FL (ref 80–99)
MONOCYTES # BLD: 0.3 K/UL (ref 0–1)
MONOCYTES NFR BLD: 10 % (ref 5–13)
NEUTS SEG # BLD: 1.3 K/UL (ref 1.8–8)
NEUTS SEG NFR BLD: 41 % (ref 32–75)
NRBC # BLD: 0 K/UL (ref 0–0.01)
NRBC BLD-RTO: 0 PER 100 WBC
PLATELET # BLD AUTO: 290 K/UL (ref 150–400)
PMV BLD AUTO: 11.4 FL (ref 8.9–12.9)
POTASSIUM SERPL-SCNC: 4.3 MMOL/L (ref 3.5–5.1)
PROT UR-MCNC: 22 MG/DL (ref 0–11.9)
PROT/CREAT UR-RTO: 0.3
RBC # BLD AUTO: 4.57 M/UL (ref 3.8–5.2)
SERVICE CMNT-IMP: NORMAL
SERVICE CMNT-IMP: NORMAL
SODIUM SERPL-SCNC: 139 MMOL/L (ref 136–145)
WBC # BLD AUTO: 3.2 K/UL (ref 3.6–11)

## 2023-08-02 PROCEDURE — 84156 ASSAY OF PROTEIN URINE: CPT

## 2023-08-02 PROCEDURE — 6370000000 HC RX 637 (ALT 250 FOR IP): Performed by: INTERNAL MEDICINE

## 2023-08-02 PROCEDURE — 84155 ASSAY OF PROTEIN SERUM: CPT

## 2023-08-02 PROCEDURE — 86706 HEP B SURFACE ANTIBODY: CPT

## 2023-08-02 PROCEDURE — 36415 COLL VENOUS BLD VENIPUNCTURE: CPT

## 2023-08-02 PROCEDURE — 85025 COMPLETE CBC W/AUTO DIFF WBC: CPT

## 2023-08-02 PROCEDURE — 86038 ANTINUCLEAR ANTIBODIES: CPT

## 2023-08-02 PROCEDURE — 6360000002 HC RX W HCPCS: Performed by: INTERNAL MEDICINE

## 2023-08-02 PROCEDURE — 1100000000 HC RM PRIVATE

## 2023-08-02 PROCEDURE — 2580000003 HC RX 258: Performed by: INTERNAL MEDICINE

## 2023-08-02 PROCEDURE — 84165 PROTEIN E-PHORESIS SERUM: CPT

## 2023-08-02 PROCEDURE — 86803 HEPATITIS C AB TEST: CPT

## 2023-08-02 PROCEDURE — 82570 ASSAY OF URINE CREATININE: CPT

## 2023-08-02 PROCEDURE — 85652 RBC SED RATE AUTOMATED: CPT

## 2023-08-02 PROCEDURE — 80048 BASIC METABOLIC PNL TOTAL CA: CPT

## 2023-08-02 PROCEDURE — 6370000000 HC RX 637 (ALT 250 FOR IP)

## 2023-08-02 PROCEDURE — 86160 COMPLEMENT ANTIGEN: CPT

## 2023-08-02 RX ADMIN — OXYCODONE HYDROCHLORIDE AND ACETAMINOPHEN 1 TABLET: 5; 325 TABLET ORAL at 09:39

## 2023-08-02 RX ADMIN — POLYETHYLENE GLYCOL 3350 17 G: 17 POWDER, FOR SOLUTION ORAL at 22:23

## 2023-08-02 RX ADMIN — POLYETHYLENE GLYCOL 3350 17 G: 17 POWDER, FOR SOLUTION ORAL at 09:38

## 2023-08-02 RX ADMIN — SODIUM CHLORIDE, PRESERVATIVE FREE 10 ML: 5 INJECTION INTRAVENOUS at 22:30

## 2023-08-02 RX ADMIN — PIPERACILLIN AND TAZOBACTAM 3375 MG: 3; .375 INJECTION, POWDER, LYOPHILIZED, FOR SOLUTION INTRAVENOUS at 14:31

## 2023-08-02 RX ADMIN — PANTOPRAZOLE SODIUM 40 MG: 40 TABLET, DELAYED RELEASE ORAL at 06:40

## 2023-08-02 RX ADMIN — CLONAZEPAM 1 MG: 0.5 TABLET ORAL at 22:22

## 2023-08-02 RX ADMIN — ENOXAPARIN SODIUM 40 MG: 100 INJECTION SUBCUTANEOUS at 09:37

## 2023-08-02 RX ADMIN — CLONAZEPAM 1 MG: 0.5 TABLET ORAL at 17:45

## 2023-08-02 RX ADMIN — PIPERACILLIN AND TAZOBACTAM 3375 MG: 3; .375 INJECTION, POWDER, LYOPHILIZED, FOR SOLUTION INTRAVENOUS at 06:13

## 2023-08-02 RX ADMIN — CLONAZEPAM 1 MG: 0.5 TABLET ORAL at 14:31

## 2023-08-02 RX ADMIN — CLONAZEPAM 1 MG: 0.5 TABLET ORAL at 09:38

## 2023-08-02 RX ADMIN — OXYCODONE HYDROCHLORIDE AND ACETAMINOPHEN 1 TABLET: 5; 325 TABLET ORAL at 22:23

## 2023-08-02 RX ADMIN — PIPERACILLIN AND TAZOBACTAM 3375 MG: 3; .375 INJECTION, POWDER, LYOPHILIZED, FOR SOLUTION INTRAVENOUS at 23:37

## 2023-08-02 RX ADMIN — OXYCODONE HYDROCHLORIDE AND ACETAMINOPHEN 1 TABLET: 5; 325 TABLET ORAL at 17:45

## 2023-08-02 RX ADMIN — BACLOFEN 20 MG: 10 TABLET ORAL at 22:22

## 2023-08-02 RX ADMIN — TRAZODONE HYDROCHLORIDE 100 MG: 100 TABLET ORAL at 22:23

## 2023-08-02 RX ADMIN — SODIUM CHLORIDE, PRESERVATIVE FREE 10 ML: 5 INJECTION INTRAVENOUS at 09:37

## 2023-08-02 RX ADMIN — BACLOFEN 20 MG: 10 TABLET ORAL at 14:31

## 2023-08-02 RX ADMIN — BACLOFEN 20 MG: 10 TABLET ORAL at 09:39

## 2023-08-02 ASSESSMENT — PAIN SCALES - GENERAL: PAINLEVEL_OUTOF10: 6

## 2023-08-02 ASSESSMENT — PAIN SCALES - WONG BAKER: WONGBAKER_NUMERICALRESPONSE: 6

## 2023-08-02 NOTE — CONSULTS
NEPHROLOGY CONSULT NOTE     Patient: Bonita Bains MRN: [id number]  PCP: Shani Stein MD   :     1985  Age:   30 y.o. Sex:  female      Referring physician: Su Walter MD  Reason for consultation: 30 y.o. female with Agitation [R45.1]  UTI (urinary tract infection) [N39.0]  Encephalopathy acute [G93.40]  Acute cystitis without hematuria [B95.38] complicated by proteinuria  Chief Complaint: AMS  Admission Date: 2023 10:27 PM  LOS: 5 days      ASSESSMENT and PLAN :   Proteinuria  - Creatinine 0.45 today, no prior renal history. - Trace urine protein on UA. Similar results on prior UA during admissions for UTI. No RBC or casts. - Will send urine protein/creatinine, micro albumin to quantify  - Check Hep B/C, SPEP, CHRIS, C3/C4    UTI  Cerebral palsy  Seizure disorder  Asthma    Care Plan discussed with:  Nurse        Thank you for consulting [facility] in the care of your patient. Subjective:   HPI: Bonita Bains is a 30 y.o.  female with past medical history of CP, seizure disorder, asthma who has been admitted to the hospital for UTI, AMS. Patient presented to the ED 23 with complaint of worsening agitation after running out of her benzodiazepines two weeks prior. Similar presentation in the past when she runs out of her medications. Also noted tactile fever. Patient nonverbal and unable to provide HPI or ROS. Evaluation in the ED reveals urinary tract infection and mildly elevated lactic acid. Started on IVF, IV antibiotics, admitted to the hospitalist service. Nephrology is consulted for persistent proteinuria  - Serum creatinine 0.45, at baseline. No prior renal history or documented nephrology evaluations  - Trace urine protein on today's UA. During prior admissions with UTI, Trace-300 mg/dl noted. No prior evaluation or creatinine/protein ratio sent. - No overt edema/anasarca, albumin 3.7.     Past Medical History:   Diagnosis Date

## 2023-08-03 VITALS
TEMPERATURE: 97.5 F | SYSTOLIC BLOOD PRESSURE: 114 MMHG | BODY MASS INDEX: 25.43 KG/M2 | OXYGEN SATURATION: 100 % | HEIGHT: 67 IN | HEART RATE: 51 BPM | DIASTOLIC BLOOD PRESSURE: 77 MMHG | RESPIRATION RATE: 16 BRPM | WEIGHT: 162.04 LBS

## 2023-08-03 PROCEDURE — 6360000002 HC RX W HCPCS: Performed by: INTERNAL MEDICINE

## 2023-08-03 PROCEDURE — 6370000000 HC RX 637 (ALT 250 FOR IP): Performed by: INTERNAL MEDICINE

## 2023-08-03 PROCEDURE — 2580000003 HC RX 258: Performed by: INTERNAL MEDICINE

## 2023-08-03 PROCEDURE — 6370000000 HC RX 637 (ALT 250 FOR IP)

## 2023-08-03 RX ORDER — AMOXICILLIN AND CLAVULANATE POTASSIUM 400; 57 MG/5ML; MG/5ML
400 POWDER, FOR SUSPENSION ORAL EVERY 12 HOURS SCHEDULED
Status: DISCONTINUED | OUTPATIENT
Start: 2023-08-03 | End: 2023-08-03 | Stop reason: HOSPADM

## 2023-08-03 RX ORDER — OXYCODONE HYDROCHLORIDE AND ACETAMINOPHEN 5; 325 MG/1; MG/1
1 TABLET ORAL EVERY 6 HOURS PRN
Qty: 20 TABLET | Refills: 0 | Status: SHIPPED | OUTPATIENT
Start: 2023-08-03 | End: 2023-08-08

## 2023-08-03 RX ADMIN — BACLOFEN 20 MG: 10 TABLET ORAL at 09:39

## 2023-08-03 RX ADMIN — CLONAZEPAM 1 MG: 0.5 TABLET ORAL at 09:39

## 2023-08-03 RX ADMIN — BACLOFEN 20 MG: 10 TABLET ORAL at 14:23

## 2023-08-03 RX ADMIN — PIPERACILLIN AND TAZOBACTAM 3375 MG: 3; .375 INJECTION, POWDER, LYOPHILIZED, FOR SOLUTION INTRAVENOUS at 05:48

## 2023-08-03 RX ADMIN — PANTOPRAZOLE SODIUM 40 MG: 40 TABLET, DELAYED RELEASE ORAL at 09:39

## 2023-08-03 RX ADMIN — CLONAZEPAM 1 MG: 0.5 TABLET ORAL at 12:32

## 2023-08-03 RX ADMIN — SODIUM CHLORIDE, PRESERVATIVE FREE 10 ML: 5 INJECTION INTRAVENOUS at 09:40

## 2023-08-03 RX ADMIN — OXYCODONE HYDROCHLORIDE AND ACETAMINOPHEN 1 TABLET: 5; 325 TABLET ORAL at 12:32

## 2023-08-03 RX ADMIN — POLYETHYLENE GLYCOL 3350 17 G: 17 POWDER, FOR SOLUTION ORAL at 09:39

## 2023-08-03 RX ADMIN — ENOXAPARIN SODIUM 40 MG: 100 INJECTION SUBCUTANEOUS at 09:39

## 2023-08-03 ASSESSMENT — PAIN SCALES - GENERAL
PAINLEVEL_OUTOF10: 4
PAINLEVEL_OUTOF10: 0
PAINLEVEL_OUTOF10: 0

## 2023-08-03 NOTE — CARE COORDINATION
08/03/23 1306   Discharge Planning   Patient expects to be discharged to: [facility] Discharge   Transition of Care Consult (CM Consult) [facility] Discharge None   [facility] Information Provided? No   Mode of Transport at Discharge BLS  ([facility] at 1415)   Confirm Follow Up Transport Family   Condition of Participation: Discharge Planning   The Patient and/or Patient Representative was provided with a Choice of Provider? Patient Representative           CM aware that pt will d/c on today and will transition home today. CM completed room visit to inform pts family of the following (at bedside). [facility] arranged 2:15P. CM will notify nurse.     CHAIM completed the need of the pt at this time    RYANN Patel CM  [phone]

## 2023-08-03 NOTE — PLAN OF CARE
Problem: Pain  Goal: Verbalizes/displays adequate comfort level or baseline comfort level  Outcome: Adequate for Discharge     Problem: Safety - Adult  Goal: Free from fall injury  Outcome: Adequate for Discharge     Problem: Skin/Tissue Integrity  Goal: Absence of new skin breakdown  Description: 1. Monitor for areas of redness and/or skin breakdown  2. Assess vascular access sites hourly  3. Every 4-6 hours minimum:  Change oxygen saturation probe site  4. Every 4-6 hours:  If on nasal continuous positive airway pressure, respiratory therapy assess nares and determine need for appliance change or resting period.   Outcome: Adequate for Discharge

## 2023-08-04 LAB
ANA SER QL: NEGATIVE
C3 SERPL-MCNC: 167 MG/DL (ref 82–167)
C4 SERPL-MCNC: 45 MG/DL (ref 12–38)

## 2023-08-07 LAB
ALBUMIN SERPL ELPH-MCNC: 3 G/DL (ref 2.9–4.4)
ALBUMIN/GLOB SERPL: 0.8 (ref 0.7–1.7)
ALPHA1 GLOB SERPL ELPH-MCNC: 0.2 G/DL (ref 0–0.4)
ALPHA2 GLOB SERPL ELPH-MCNC: 0.9 G/DL (ref 0.4–1)
B-GLOBULIN SERPL ELPH-MCNC: 1.2 G/DL (ref 0.7–1.3)
GAMMA GLOB SERPL ELPH-MCNC: 1.5 G/DL (ref 0.4–1.8)
GLOBULIN SER CALC-MCNC: 3.8 G/DL (ref 2.2–3.9)
M PROTEIN SERPL ELPH-MCNC: NORMAL G/DL
PROT SERPL-MCNC: 6.8 G/DL (ref 6–8.5)

## 2023-08-26 PROBLEM — N39.0 UTI (URINARY TRACT INFECTION): Status: RESOLVED | Noted: 2023-07-27 | Resolved: 2023-08-26

## 2023-10-29 ENCOUNTER — HOSPITAL ENCOUNTER (EMERGENCY)
Facility: HOSPITAL | Age: 38
Discharge: HOME | End: 2023-10-29
Attending: EMERGENCY MEDICINE
Payer: MEDICARE

## 2023-10-29 ENCOUNTER — APPOINTMENT (OUTPATIENT)
Facility: HOSPITAL | Age: 38
End: 2023-10-29
Payer: MEDICARE

## 2023-10-29 VITALS
SYSTOLIC BLOOD PRESSURE: 133 MMHG | HEART RATE: 105 BPM | RESPIRATION RATE: 18 BRPM | OXYGEN SATURATION: 96 % | BODY MASS INDEX: 26.68 KG/M2 | HEIGHT: 67 IN | TEMPERATURE: 98.2 F | DIASTOLIC BLOOD PRESSURE: 86 MMHG | WEIGHT: 170 LBS

## 2023-10-29 DIAGNOSIS — N30.00 ACUTE CYSTITIS WITHOUT HEMATURIA: Primary | ICD-10-CM

## 2023-10-29 DIAGNOSIS — G93.40 ENCEPHALOPATHY ACUTE: ICD-10-CM

## 2023-10-29 LAB
ALBUMIN SERPL-MCNC: 3.2 G/DL (ref 3.5–5)
ALBUMIN/GLOB SERPL: 0.7 (ref 1.1–2.2)
ALP SERPL-CCNC: 119 U/L (ref 45–117)
ALT SERPL-CCNC: 28 U/L (ref 12–78)
ANION GAP BLD CALC-SCNC: 9 (ref 10–20)
ANION GAP SERPL CALC-SCNC: 8 MMOL/L (ref 5–15)
APPEARANCE UR: CLEAR
AST SERPL-CCNC: 26 U/L (ref 15–37)
BACTERIA URNS QL MICRO: ABNORMAL /HPF
BASOPHILS # BLD: 0 K/UL (ref 0–0.1)
BASOPHILS NFR BLD: 0 % (ref 0–1)
BILIRUB SERPL-MCNC: 0.4 MG/DL (ref 0.2–1)
BILIRUB UR QL: NEGATIVE
BUN SERPL-MCNC: 9 MG/DL (ref 6–20)
BUN/CREAT SERPL: 20 (ref 12–20)
CA-I BLD-MCNC: 1.21 MMOL/L (ref 1.12–1.32)
CALCIUM SERPL-MCNC: 9.2 MG/DL (ref 8.5–10.1)
CHLORIDE BLD-SCNC: 108 MMOL/L (ref 100–108)
CHLORIDE SERPL-SCNC: 102 MMOL/L (ref 97–108)
CO2 BLD-SCNC: 26 MMOL/L (ref 19–24)
CO2 SERPL-SCNC: 28 MMOL/L (ref 21–32)
COLOR UR: ABNORMAL
CREAT SERPL-MCNC: 0.46 MG/DL (ref 0.55–1.02)
CREAT UR-MCNC: 0.4 MG/DL (ref 0.6–1.3)
DIFFERENTIAL METHOD BLD: NORMAL
EOSINOPHIL # BLD: 0 K/UL (ref 0–0.4)
EOSINOPHIL NFR BLD: 1 % (ref 0–7)
EPITH CASTS URNS QL MICRO: ABNORMAL /LPF
ERYTHROCYTE [DISTWIDTH] IN BLOOD BY AUTOMATED COUNT: 12.6 % (ref 11.5–14.5)
GLOBULIN SER CALC-MCNC: 4.7 G/DL (ref 2–4)
GLUCOSE BLD STRIP.AUTO-MCNC: 90 MG/DL (ref 74–106)
GLUCOSE SERPL-MCNC: 97 MG/DL (ref 65–100)
GLUCOSE UR STRIP.AUTO-MCNC: NEGATIVE MG/DL
HCG UR QL: NEGATIVE
HCT VFR BLD AUTO: 39.6 % (ref 35–47)
HGB BLD-MCNC: 13.1 G/DL (ref 11.5–16)
HGB UR QL STRIP: NEGATIVE
IMM GRANULOCYTES # BLD AUTO: 0 K/UL (ref 0–0.04)
IMM GRANULOCYTES NFR BLD AUTO: 0 % (ref 0–0.5)
KETONES UR QL STRIP.AUTO: NEGATIVE MG/DL
LACTATE BLD-SCNC: 0.8 MMOL/L (ref 0.4–2)
LEUKOCYTE ESTERASE UR QL STRIP.AUTO: ABNORMAL
LIPASE SERPL-CCNC: 26 U/L (ref 13–75)
LYMPHOCYTES # BLD: 1.4 K/UL (ref 0.8–3.5)
LYMPHOCYTES NFR BLD: 30 % (ref 12–49)
MCH RBC QN AUTO: 27.5 PG (ref 26–34)
MCHC RBC AUTO-ENTMCNC: 33.1 G/DL (ref 30–36.5)
MCV RBC AUTO: 83.2 FL (ref 80–99)
MONOCYTES # BLD: 0.5 K/UL (ref 0–1)
MONOCYTES NFR BLD: 10 % (ref 5–13)
NEUTS SEG # BLD: 2.8 K/UL (ref 1.8–8)
NEUTS SEG NFR BLD: 59 % (ref 32–75)
NITRITE UR QL STRIP.AUTO: NEGATIVE
NRBC # BLD: 0 K/UL (ref 0–0.01)
NRBC BLD-RTO: 0 PER 100 WBC
PH UR STRIP: 8 (ref 5–8)
PLATELET # BLD AUTO: 227 K/UL (ref 150–400)
PMV BLD AUTO: 12.6 FL (ref 8.9–12.9)
POTASSIUM BLD-SCNC: 4.2 MMOL/L (ref 3.5–5.5)
POTASSIUM SERPL-SCNC: 4 MMOL/L (ref 3.5–5.1)
PROT SERPL-MCNC: 7.9 G/DL (ref 6.4–8.2)
PROT UR STRIP-MCNC: NEGATIVE MG/DL
RBC # BLD AUTO: 4.76 M/UL (ref 3.8–5.2)
RBC #/AREA URNS HPF: ABNORMAL /HPF (ref 0–5)
SERVICE CMNT-IMP: ABNORMAL
SODIUM BLD-SCNC: 143 MMOL/L (ref 136–145)
SODIUM SERPL-SCNC: 138 MMOL/L (ref 136–145)
SP GR UR REFRACTOMETRY: <1.005
SPECIMEN SITE: ABNORMAL
URINE CULTURE IF INDICATED: ABNORMAL
UROBILINOGEN UR QL STRIP.AUTO: 1 EU/DL (ref 0.2–1)
WBC # BLD AUTO: 4.8 K/UL (ref 3.6–11)
WBC URNS QL MICRO: ABNORMAL /HPF (ref 0–4)

## 2023-10-29 PROCEDURE — 85025 COMPLETE CBC W/AUTO DIFF WBC: CPT

## 2023-10-29 PROCEDURE — 83690 ASSAY OF LIPASE: CPT

## 2023-10-29 PROCEDURE — 81025 URINE PREGNANCY TEST: CPT

## 2023-10-29 PROCEDURE — 87086 URINE CULTURE/COLONY COUNT: CPT

## 2023-10-29 PROCEDURE — 36415 COLL VENOUS BLD VENIPUNCTURE: CPT

## 2023-10-29 PROCEDURE — 96375 TX/PRO/DX INJ NEW DRUG ADDON: CPT

## 2023-10-29 PROCEDURE — 83605 ASSAY OF LACTIC ACID: CPT

## 2023-10-29 PROCEDURE — 80053 COMPREHEN METABOLIC PANEL: CPT

## 2023-10-29 PROCEDURE — 96365 THER/PROPH/DIAG IV INF INIT: CPT

## 2023-10-29 PROCEDURE — 87186 SC STD MICRODIL/AGAR DIL: CPT

## 2023-10-29 PROCEDURE — 99285 EMERGENCY DEPT VISIT HI MDM: CPT

## 2023-10-29 PROCEDURE — 74176 CT ABD & PELVIS W/O CONTRAST: CPT

## 2023-10-29 PROCEDURE — 80047 BASIC METABLC PNL IONIZED CA: CPT

## 2023-10-29 PROCEDURE — 6360000004 HC RX CONTRAST MEDICATION: Performed by: EMERGENCY MEDICINE

## 2023-10-29 PROCEDURE — 87077 CULTURE AEROBIC IDENTIFY: CPT

## 2023-10-29 PROCEDURE — 6360000002 HC RX W HCPCS: Performed by: EMERGENCY MEDICINE

## 2023-10-29 PROCEDURE — 2580000003 HC RX 258: Performed by: EMERGENCY MEDICINE

## 2023-10-29 PROCEDURE — 74018 RADEX ABDOMEN 1 VIEW: CPT

## 2023-10-29 PROCEDURE — 81001 URINALYSIS AUTO W/SCOPE: CPT

## 2023-10-29 RX ORDER — KETOROLAC TROMETHAMINE 30 MG/ML
15 INJECTION, SOLUTION INTRAMUSCULAR; INTRAVENOUS
Status: COMPLETED | OUTPATIENT
Start: 2023-10-29 | End: 2023-10-29

## 2023-10-29 RX ORDER — CEFDINIR 250 MG/5ML
300 POWDER, FOR SUSPENSION ORAL 2 TIMES DAILY
Qty: 84 ML | Refills: 0 | Status: SHIPPED | OUTPATIENT
Start: 2023-10-29 | End: 2023-11-05

## 2023-10-29 RX ORDER — ONDANSETRON 2 MG/ML
4 INJECTION INTRAMUSCULAR; INTRAVENOUS ONCE
Status: COMPLETED | OUTPATIENT
Start: 2023-10-29 | End: 2023-10-29

## 2023-10-29 RX ORDER — LORAZEPAM 2 MG/ML
0.5 INJECTION INTRAMUSCULAR ONCE
Status: COMPLETED | OUTPATIENT
Start: 2023-10-29 | End: 2023-10-29

## 2023-10-29 RX ADMIN — LORAZEPAM 0.5 MG: 2 INJECTION INTRAMUSCULAR; INTRAVENOUS at 12:09

## 2023-10-29 RX ADMIN — DIATRIZOATE MEGLUMINE AND DIATRIZOATE SODIUM 30 ML: 660; 100 LIQUID ORAL; RECTAL at 20:08

## 2023-10-29 RX ADMIN — KETOROLAC TROMETHAMINE 15 MG: 30 INJECTION, SOLUTION INTRAMUSCULAR; INTRAVENOUS at 12:10

## 2023-10-29 RX ADMIN — ONDANSETRON 4 MG: 2 INJECTION INTRAMUSCULAR; INTRAVENOUS at 12:10

## 2023-10-29 RX ADMIN — CEFTRIAXONE SODIUM 1000 MG: 1 INJECTION, POWDER, FOR SOLUTION INTRAMUSCULAR; INTRAVENOUS at 14:33

## 2023-10-29 RX ADMIN — DIATRIZOATE MEGLUMINE AND DIATRIZOATE SODIUM 30 ML: 660; 100 LIQUID ORAL; RECTAL at 20:09

## 2023-10-29 ASSESSMENT — PAIN SCALES - WONG BAKER: WONGBAKER_NUMERICALRESPONSE: 8

## 2023-10-29 ASSESSMENT — PAIN DESCRIPTION - PAIN TYPE: TYPE: ACUTE PAIN

## 2023-10-29 ASSESSMENT — PAIN - FUNCTIONAL ASSESSMENT
PAIN_FUNCTIONAL_ASSESSMENT: WONG-BAKER FACES
PAIN_FUNCTIONAL_ASSESSMENT: ADULT NONVERBAL PAIN SCALE (NPVS)

## 2023-10-29 ASSESSMENT — PAIN DESCRIPTION - LOCATION: LOCATION: FLANK

## 2023-10-29 NOTE — ED NOTES
Contacted pharmacy for omnipaque not showing in ER pyxis, radiology tech stated they do not have it in their area.       Scout Chi RN  10/29/23 9572

## 2023-10-29 NOTE — ED PROVIDER NOTES
ordered. Radiology: ordered. Risk  Prescription drug management. 22-year-old female baseline nonverbal, history of cerebral palsy with spastic quadriplegia presenting to the emergency department with agitation and pain, concern for kidney stone. Patient had incidental finding of right-sided kidney stone on x-ray of the hip performed at another facility and they were referred to urology. Patient has had poor p.o. intake with increased agitation over the past 12 to 24 hours making family concerned that she is passing a kidney stone. States she has history of UTI which causes her to become agitated as well and they are concerned for UTI. Further history unable to be obtained from patient given her baseline nonverbal status. Exam is fairly unremarkable. I will check CT abdomen pelvis without contrast to look for signs of stone or other acute intra-abdominal process, evaluate with urinalysis and lab work. I will treat symptomatically with IV Toradol, IV Zofran, IV Ativan and reassess. Work-up largely unremarkable. Urinary tract infection present on urinalysis, I will treat with IV Rocephin and treat as an outpatient with p.o. cefdinir. CT demonstrating some concern for retracted G-tube, we do not have materials to test G-tube function at this time or to administer Gastroview. Patient's family is bringing the materials to the facility so that we can administer Gastroview and obtain upright plain film to ensure G-tube is in correct place and not causing patient's agitation and pain given that patient is unable to communicate with us. Anticipate discharge home if G-tube is functioning and in correct place.     ED Course as of 10/29/23 3318   Socorro Cue Oct 29, 2023   1032 Personally reviewed PM+R office visit records from 9/28/2023 which details patient's history of spastic tetraplegia secondary to cerebral palsy, nonverbal at baseline, history of spasticity currently taking baclofen and clonazepam, follows

## 2023-10-29 NOTE — DISCHARGE INSTRUCTIONS
Juani Young was seen in the emergency department with agitation and concern for pain, concern for UTI and kidney stone. Her urinalysis does appear consistent with urinary tract infection, please administer at the antibiotics as prescribed until completion. Her CT scan does not show any sign of a kidney stone and the remainder of her work-up is unremarkable. Please follow-up with her primary care doctors and specialist at [address] [place] [address]. If she develops any fevers, vomiting, or further agitation please return to the emergency department immediately.

## 2023-10-30 NOTE — ED NOTES
Contrast fklush with xray confirmation with MD at bedside completed. Pt tolerated well.       Vineet Martin RN  10/29/23 2011

## 2023-10-30 NOTE — ED NOTES
Patient (s)  given copy of dc instructions and 1 script(s). Patient (s)  verbalized understanding of instructions and script (s). Patient given a current medication reconciliation form and verbalized understanding of their medications. Patient (s) verbalized understanding of the importance of discussing medications with his or her physician or clinic they will be following up with. Patient alert and oriented and in no acute distress. Patient discharged home via hospital to home.        Komal Andrew RN  10/29/23 2716

## 2023-11-01 LAB
BACTERIA SPEC CULT: ABNORMAL
BACTERIA SPEC CULT: ABNORMAL
CC UR VC: ABNORMAL
SERVICE CMNT-IMP: ABNORMAL

## 2023-11-10 ENCOUNTER — HOSPITAL ENCOUNTER (EMERGENCY)
Facility: HOSPITAL | Age: 38
Discharge: HOME | End: 2023-11-10
Attending: STUDENT IN AN ORGANIZED HEALTH CARE EDUCATION/TRAINING PROGRAM
Payer: MEDICARE

## 2023-11-10 VITALS
DIASTOLIC BLOOD PRESSURE: 108 MMHG | RESPIRATION RATE: 18 BRPM | HEART RATE: 92 BPM | OXYGEN SATURATION: 95 % | SYSTOLIC BLOOD PRESSURE: 138 MMHG

## 2023-11-10 DIAGNOSIS — K94.20 COMPLICATION OF FEEDING TUBE (HCC): Primary | ICD-10-CM

## 2023-11-10 PROCEDURE — 43762 RPLC GTUBE NO REVJ TRC: CPT

## 2023-11-10 PROCEDURE — 99283 EMERGENCY DEPT VISIT LOW MDM: CPT

## 2023-11-10 PROCEDURE — 51702 INSERT TEMP BLADDER CATH: CPT

## 2023-11-10 ASSESSMENT — PAIN - FUNCTIONAL ASSESSMENT: PAIN_FUNCTIONAL_ASSESSMENT: ADULT NONVERBAL PAIN SCALE (NPVS)

## 2023-11-10 NOTE — DISCHARGE INSTRUCTIONS
Please make a followup with Clarissa's primary care physician. If you have any new or worsening concerning medical symptoms please return to the emergency department.

## 2023-11-10 NOTE — ED NOTES
Pt presents to ER from home via EMS w/ complaints of G Tube complications. Pt is bed bound and non-verbal, tube came out this afternoon.  MD at bedside to replace      Milton Vincent RN  11/10/23 0625

## 2023-11-11 NOTE — ED PROVIDER NOTES
[facility] EMERGENCY DEPT  EMERGENCY DEPARTMENT ENCOUNTER       Pt Name: Virginia Anaya  MRN: [id number]  [address] [facility] 1985  Date of evaluation: 11/10/2023  Provider: Jason Lynne MD   PCP: Karla Silver MD  Note Started: 12:42 AM EST 11/11/23     CHIEF COMPLAINT       Chief Complaint   Patient presents with    G Tube Complications     Pt presents to ER from home via EMS w/ complaints of G Tube complications. Pt is bed bound and non-verbal, tube came out this afternoon. MD at bedside to replace      HISTORY OF PRESENT ILLNESS: 1 or more elements      History From: patient's mother, History limited by: patient nonverbal     Virginia Anaya is a 41 y.o. female w hx of CP, nonverbal, reliant on G tube for all feeds/medications presents after G tube removed. Patient has had some discomfort over the past two weeks and family concerned that G tube not working properly. Was evaluated previously at [address] and G tube felt to be in correct location per family. G tube came out this morning. Please See MDM for Additional Details of the HPI/PMH  Nursing Notes were all reviewed and agreed with or any disagreements were addressed in the HPI. REVIEW OF SYSTEMS        Positives and Pertinent negatives as per HPI. PAST HISTORY     Past Medical History:  Past Medical History:   Diagnosis Date    Aspiration into respiratory tract     Asthma     Cerebral palsy ([address])     Seizure ([address])     Skull fractures ([address])     @ birth       Past Surgical History:  Past Surgical History:   Procedure Laterality Date    GI      feeding tube; nipson to help prevent asipration    IR PERC REPLACE GASTROJEJUNO TUBE  9/10/2021    IR REPLACE G TUBE PERC  5/17/2022       Family History:  Family History   Problem Relation Age of Onset    No Known Problems Mother     No Known Problems Father        Social History:  Social History     Tobacco Use    Smoking status: Never    Smokeless tobacco: Never   Substance Use Topics    Alcohol use:  No

## 2023-12-08 ENCOUNTER — HOSPITAL ENCOUNTER (EMERGENCY)
Facility: HOSPITAL | Age: 38
Discharge: HOME | End: 2023-12-08
Attending: EMERGENCY MEDICINE
Payer: MEDICARE

## 2023-12-08 VITALS
BODY MASS INDEX: 27.94 KG/M2 | RESPIRATION RATE: 17 BRPM | SYSTOLIC BLOOD PRESSURE: 141 MMHG | HEIGHT: 67 IN | OXYGEN SATURATION: 97 % | DIASTOLIC BLOOD PRESSURE: 105 MMHG | WEIGHT: 178 LBS | TEMPERATURE: 97.9 F | HEART RATE: 76 BPM

## 2023-12-08 DIAGNOSIS — R45.1 AGITATION: Primary | ICD-10-CM

## 2023-12-08 DIAGNOSIS — N30.00 ACUTE CYSTITIS WITHOUT HEMATURIA: ICD-10-CM

## 2023-12-08 LAB
ALBUMIN SERPL-MCNC: 3.3 G/DL (ref 3.5–5)
ALBUMIN/GLOB SERPL: 0.7 (ref 1.1–2.2)
ALP SERPL-CCNC: 131 U/L (ref 45–117)
ALT SERPL-CCNC: 25 U/L (ref 12–78)
ANION GAP SERPL CALC-SCNC: 9 MMOL/L (ref 5–15)
APPEARANCE UR: CLEAR
AST SERPL-CCNC: 25 U/L (ref 15–37)
BACTERIA URNS QL MICRO: ABNORMAL /HPF
BASOPHILS # BLD: 0 K/UL (ref 0–0.1)
BASOPHILS NFR BLD: 1 % (ref 0–1)
BILIRUB SERPL-MCNC: 0.3 MG/DL (ref 0.2–1)
BILIRUB UR QL: NEGATIVE
BUN SERPL-MCNC: 13 MG/DL (ref 6–20)
BUN/CREAT SERPL: 25 (ref 12–20)
CALCIUM SERPL-MCNC: 9.6 MG/DL (ref 8.5–10.1)
CHLORIDE SERPL-SCNC: 103 MMOL/L (ref 97–108)
CO2 SERPL-SCNC: 26 MMOL/L (ref 21–32)
COLOR UR: ABNORMAL
CREAT SERPL-MCNC: 0.51 MG/DL (ref 0.55–1.02)
DIFFERENTIAL METHOD BLD: ABNORMAL
EOSINOPHIL # BLD: 0.1 K/UL (ref 0–0.4)
EOSINOPHIL NFR BLD: 1 % (ref 0–7)
EPITH CASTS URNS QL MICRO: ABNORMAL /LPF
ERYTHROCYTE [DISTWIDTH] IN BLOOD BY AUTOMATED COUNT: 12.9 % (ref 11.5–14.5)
GLOBULIN SER CALC-MCNC: 4.9 G/DL (ref 2–4)
GLUCOSE SERPL-MCNC: 90 MG/DL (ref 65–100)
GLUCOSE UR STRIP.AUTO-MCNC: NEGATIVE MG/DL
HCT VFR BLD AUTO: 38.6 % (ref 35–47)
HGB BLD-MCNC: 13.1 G/DL (ref 11.5–16)
HGB UR QL STRIP: NEGATIVE
IMM GRANULOCYTES # BLD AUTO: 0 K/UL (ref 0–0.04)
IMM GRANULOCYTES NFR BLD AUTO: 0 % (ref 0–0.5)
KETONES UR QL STRIP.AUTO: NEGATIVE MG/DL
LEUKOCYTE ESTERASE UR QL STRIP.AUTO: ABNORMAL
LIPASE SERPL-CCNC: 27 U/L (ref 13–75)
LYMPHOCYTES # BLD: 1.5 K/UL (ref 0.8–3.5)
LYMPHOCYTES NFR BLD: 28 % (ref 12–49)
MCH RBC QN AUTO: 27.6 PG (ref 26–34)
MCHC RBC AUTO-ENTMCNC: 33.9 G/DL (ref 30–36.5)
MCV RBC AUTO: 81.4 FL (ref 80–99)
MONOCYTES # BLD: 0.8 K/UL (ref 0–1)
MONOCYTES NFR BLD: 14 % (ref 5–13)
NEUTS SEG # BLD: 2.9 K/UL (ref 1.8–8)
NEUTS SEG NFR BLD: 56 % (ref 32–75)
NITRITE UR QL STRIP.AUTO: POSITIVE
NRBC # BLD: 0 K/UL (ref 0–0.01)
NRBC BLD-RTO: 0 PER 100 WBC
PH UR STRIP: 7 (ref 5–8)
PLATELET # BLD AUTO: 286 K/UL (ref 150–400)
PMV BLD AUTO: 11.7 FL (ref 8.9–12.9)
POTASSIUM SERPL-SCNC: 4.5 MMOL/L (ref 3.5–5.1)
PROT SERPL-MCNC: 8.2 G/DL (ref 6.4–8.2)
PROT UR STRIP-MCNC: NEGATIVE MG/DL
RBC # BLD AUTO: 4.74 M/UL (ref 3.8–5.2)
RBC #/AREA URNS HPF: ABNORMAL /HPF (ref 0–5)
SODIUM SERPL-SCNC: 138 MMOL/L (ref 136–145)
SP GR UR REFRACTOMETRY: <1.005
URINE CULTURE IF INDICATED: ABNORMAL
UROBILINOGEN UR QL STRIP.AUTO: 0.2 EU/DL (ref 0.2–1)
WBC # BLD AUTO: 5.2 K/UL (ref 3.6–11)
WBC URNS QL MICRO: ABNORMAL /HPF (ref 0–4)

## 2023-12-08 PROCEDURE — 6360000002 HC RX W HCPCS: Performed by: EMERGENCY MEDICINE

## 2023-12-08 PROCEDURE — 87077 CULTURE AEROBIC IDENTIFY: CPT

## 2023-12-08 PROCEDURE — 2580000003 HC RX 258: Performed by: EMERGENCY MEDICINE

## 2023-12-08 PROCEDURE — 81001 URINALYSIS AUTO W/SCOPE: CPT

## 2023-12-08 PROCEDURE — 99284 EMERGENCY DEPT VISIT MOD MDM: CPT

## 2023-12-08 PROCEDURE — 96365 THER/PROPH/DIAG IV INF INIT: CPT

## 2023-12-08 PROCEDURE — 83690 ASSAY OF LIPASE: CPT

## 2023-12-08 PROCEDURE — 87186 SC STD MICRODIL/AGAR DIL: CPT

## 2023-12-08 PROCEDURE — 87086 URINE CULTURE/COLONY COUNT: CPT

## 2023-12-08 PROCEDURE — 85025 COMPLETE CBC W/AUTO DIFF WBC: CPT

## 2023-12-08 PROCEDURE — 36415 COLL VENOUS BLD VENIPUNCTURE: CPT

## 2023-12-08 PROCEDURE — 96375 TX/PRO/DX INJ NEW DRUG ADDON: CPT

## 2023-12-08 PROCEDURE — 80053 COMPREHEN METABOLIC PANEL: CPT

## 2023-12-08 RX ORDER — CEPHALEXIN 500 MG/1
500 CAPSULE ORAL 2 TIMES DAILY
Qty: 14 CAPSULE | Refills: 0 | Status: SHIPPED | OUTPATIENT
Start: 2023-12-08 | End: 2023-12-15

## 2023-12-08 RX ORDER — KETOROLAC TROMETHAMINE 30 MG/ML
15 INJECTION, SOLUTION INTRAMUSCULAR; INTRAVENOUS ONCE
Status: COMPLETED | OUTPATIENT
Start: 2023-12-08 | End: 2023-12-08

## 2023-12-08 RX ADMIN — CEFTRIAXONE SODIUM 1000 MG: 1 INJECTION, POWDER, FOR SOLUTION INTRAMUSCULAR; INTRAVENOUS at 21:56

## 2023-12-08 RX ADMIN — KETOROLAC TROMETHAMINE 15 MG: 30 INJECTION, SOLUTION INTRAMUSCULAR; INTRAVENOUS at 21:54

## 2023-12-08 ASSESSMENT — PAIN - FUNCTIONAL ASSESSMENT: PAIN_FUNCTIONAL_ASSESSMENT: ADULT NONVERBAL PAIN SCALE (NPVS)

## 2023-12-09 NOTE — ED PROVIDER NOTES
[address] EMERGENCY DEPT  EMERGENCY DEPARTMENT ENCOUNTER       Pt Name: Bola Hirsch  MRN: [id number]  [address] [facility] [place] 1985  Date of evaluation: 12/8/2023  Provider: Jessica Ruiz MD   PCP: Nuzhat Sigala MD  Note Started: 1:50 AM 12/8/23     CHIEF COMPLAINT       Chief Complaint   Patient presents with    Urinary Tract Infection      HISTORY OF PRESENT ILLNESS: 1 or more elements      History From: Caregiver, History limited by: baseline nonverbal status     Bola Hirsch is a 41 y.o. female who presents with increasing agitation. Caregiver notes increasing agitation since noon today. Normal level of activity otherwise. No other changes to her neurologic status. Nursing Notes were all reviewed and agreed with or any disagreements were addressed in the HPI. REVIEW OF SYSTEMS        Positives and Pertinent negatives as per HPI. PAST HISTORY     Past Medical History:  Past Medical History:   Diagnosis Date    Aspiration into respiratory tract     Asthma     Cerebral palsy ([address])     Seizure ([address])     Skull fractures ([address])     @ birth       Past Surgical History:  Past Surgical History:   Procedure Laterality Date    GI      feeding tube; nipson to help prevent asipration    IR PERC REPLACE GASTROJEJUNO TUBE  9/10/2021    IR REPLACE G TUBE PERC  5/17/2022       Family History:  Family History   Problem Relation Age of Onset    No Known Problems Mother     No Known Problems Father        Social History:  Social History     Tobacco Use    Smoking status: Never    Smokeless tobacco: Never   Substance Use Topics    Alcohol use: No    Drug use: No       Allergies:   Allergies   Allergen Reactions    Senna Swelling     Lips swell       CURRENT MEDICATIONS      Discharge Medication List as of 12/8/2023 10:54 PM        CONTINUE these medications which have NOT CHANGED    Details   baclofen (LIORESAL) 20 MG tablet Take 1 mg by mouth 4 times dailyHistorical Med      clonazePAM (KLONOPIN) 1 MG tablet Take 1 mg by mouth 4 times daily. Historical Med      dicyclomine (BENTYL) 20 MG tablet Take 20 mg by mouth in the morning and 20 mg at noon and 20 mg in the evening and 20 mg before bedtime. Historical Med      melatonin 1 MG tablet Take 2 mg by mouthHistorical Med      polyethylene glycol (GLYCOLAX) 17 GM/SCOOP powder Take 17 g by mouth dailyHistorical Med             SCREENINGS               No data recorded         PHYSICAL EXAM      Vitals:    12/08/23 2053 12/08/23 2116 12/08/23 2200 12/08/23 2253   BP: (!) 140/97 130/80 (!) 141/105    Pulse:  89 76    Resp:  17 17    Temp:       TempSrc:       SpO2:  100% 100% 97%   Weight:       Height:            Physical Exam  Vitals and nursing note reviewed. Constitutional:       Comments: Alert, eyes open, gazing around room   HENT:      Head: Atraumatic. Mouth/Throat:      Mouth: Mucous membranes are moist.   Cardiovascular:      Rate and Rhythm: Regular rhythm. Tachycardia present. Abdominal:      Comments: G-tube in place, abdomen is distended, nontender, normal active bowel sounds   Skin:     General: Skin is warm and dry. Neurological:      Mental Status: She is alert.           DIAGNOSTIC RESULTS   LABS:     Recent Results (from the past 12 hour(s))   CBC with Auto Differential    Collection Time: 12/08/23  8:56 PM   Result Value Ref Range    WBC 5.2 3.6 - 11.0 K/uL    RBC 4.74 3.80 - 5.20 M/uL    Hemoglobin 13.1 11.5 - 16.0 g/dL    Hematocrit 38.6 35.0 - 47.0 %    MCV 81.4 80.0 - 99.0 FL    MCH 27.6 26.0 - 34.0 PG    MCHC 33.9 30.0 - 36.5 g/dL    RDW 12.9 11.5 - 14.5 %    Platelets 614 976 - 905 K/uL    MPV 11.7 8.9 - 12.9 FL    Nucleated RBCs 0.0 0  WBC    nRBC 0.00 0.00 - 0.01 K/uL    Neutrophils % 56 32 - 75 %    Lymphocytes % 28 12 - 49 %    Monocytes % 14 (H) 5 - 13 %    Eosinophils % 1 0 - 7 %    Basophils % 1 0 - 1 %    Immature Granulocytes 0 0.0 - 0.5 %    Neutrophils Absolute 2.9 1.8 - 8.0 K/UL    Lymphocytes Absolute 1.5 0.8 - 3.5 K/UL    Monocytes

## 2023-12-09 NOTE — ED TRIAGE NOTES
Pt arrives via [facility] w/ mom for UTI concern. Hx cerebral palsy, nonverbal at baseline. Mom reports increased agitation, restlessness, poor intake.

## 2023-12-09 NOTE — ED NOTES
Patient's mother given copy of dc instructions and 1 script(s). Patient (s) mother  verbalized understanding of instructions and script (s). Patient given a current medication reconciliation form and verbalized understanding of their medications. Patient (s) mother verbalized understanding of the importance of discussing medications with his or her physician or clinic they will be following up with. Patient alert and oriented and in no acute distress. Patient discharged home via stretcher transport Hospital-to-Home with mother.        Bao Valencia RN  12/08/23 3848

## 2023-12-09 NOTE — ED NOTES
Discharge instructions were given to the patient by Sukhi Singletary. The patient left the Emergency Department ambulatory, alert and oriented and in no acute distress with 1 prescriptions. The patient was encouraged to call or return to the ED for worsening issues or problems and was encouraged to schedule a follow up appointment for continuing care. The patient verbalized understanding of discharge instructions and prescriptions, all questions were answered. The patient has no further concerns at this time.         Cherelle Tomlinson RN  12/08/23 5072

## 2023-12-10 LAB
BACTERIA SPEC CULT: NORMAL
CC UR VC: NORMAL
SERVICE CMNT-IMP: NORMAL

## 2023-12-12 LAB
BACTERIA SPEC CULT: ABNORMAL
CC UR VC: ABNORMAL
SERVICE CMNT-IMP: ABNORMAL

## 2023-12-12 RX ORDER — CIPROFLOXACIN 500 MG/5ML
500 KIT ORAL 2 TIMES DAILY
Qty: 70 ML | Refills: 0 | Status: SHIPPED | OUTPATIENT
Start: 2023-12-12 | End: 2023-12-12

## 2023-12-12 RX ORDER — CIPROFLOXACIN 500 MG/5ML
500 KIT ORAL 2 TIMES DAILY
Qty: 70 ML | Refills: 0 | Status: SHIPPED | OUTPATIENT
Start: 2023-12-12 | End: 2023-12-12 | Stop reason: SDUPTHER

## 2023-12-12 RX ORDER — LEVOFLOXACIN 25 MG/ML
500 SOLUTION ORAL DAILY
Qty: 140 ML | Refills: 0 | Status: SHIPPED | OUTPATIENT
Start: 2023-12-12 | End: 2023-12-15

## 2023-12-15 PROBLEM — N39.0 UTI (URINARY TRACT INFECTION): Status: ACTIVE | Noted: 2023-12-15

## 2023-12-28 ENCOUNTER — HOSPITAL ENCOUNTER (EMERGENCY)
Facility: HOSPITAL | Age: 38
Discharge: HOME | End: 2023-12-29
Payer: MEDICARE

## 2023-12-28 ENCOUNTER — APPOINTMENT (OUTPATIENT)
Facility: HOSPITAL | Age: 38
End: 2023-12-28
Payer: MEDICARE

## 2023-12-28 ENCOUNTER — FOLLOWUP TELEPHONE ENCOUNTER (OUTPATIENT)
Facility: HOSPITAL | Age: 38
End: 2023-12-28

## 2023-12-28 VITALS
RESPIRATION RATE: 20 BRPM | TEMPERATURE: 97.9 F | OXYGEN SATURATION: 98 % | HEART RATE: 100 BPM | SYSTOLIC BLOOD PRESSURE: 127 MMHG | HEIGHT: 66 IN | WEIGHT: 178 LBS | BODY MASS INDEX: 28.61 KG/M2 | DIASTOLIC BLOOD PRESSURE: 69 MMHG

## 2023-12-28 DIAGNOSIS — B37.41 YEAST CYSTITIS: Primary | ICD-10-CM

## 2023-12-28 DIAGNOSIS — R45.1 AGITATION: ICD-10-CM

## 2023-12-28 LAB
ALBUMIN SERPL-MCNC: 3.2 G/DL (ref 3.5–5)
ALBUMIN/GLOB SERPL: 0.7 (ref 1.1–2.2)
ALP SERPL-CCNC: 108 U/L (ref 45–117)
ALT SERPL-CCNC: 28 U/L (ref 12–78)
ANION GAP SERPL CALC-SCNC: 8 MMOL/L (ref 5–15)
APPEARANCE UR: CLEAR
AST SERPL-CCNC: 38 U/L (ref 15–37)
BACTERIA URNS QL MICRO: NEGATIVE /HPF
BASOPHILS # BLD: 0 K/UL (ref 0–0.1)
BASOPHILS NFR BLD: 0 % (ref 0–1)
BILIRUB SERPL-MCNC: 0.5 MG/DL (ref 0.2–1)
BILIRUB UR QL: NEGATIVE
BUN SERPL-MCNC: 12 MG/DL (ref 6–20)
BUN/CREAT SERPL: 29 (ref 12–20)
CALCIUM SERPL-MCNC: 9.3 MG/DL (ref 8.5–10.1)
CHLORIDE SERPL-SCNC: 100 MMOL/L (ref 97–108)
CO2 SERPL-SCNC: 27 MMOL/L (ref 21–32)
COLOR UR: ABNORMAL
CREAT SERPL-MCNC: 0.42 MG/DL (ref 0.55–1.02)
DIFFERENTIAL METHOD BLD: NORMAL
EOSINOPHIL # BLD: 0.1 K/UL (ref 0–0.4)
EOSINOPHIL NFR BLD: 2 % (ref 0–7)
EPITH CASTS URNS QL MICRO: ABNORMAL /LPF
ERYTHROCYTE [DISTWIDTH] IN BLOOD BY AUTOMATED COUNT: 12.8 % (ref 11.5–14.5)
GLOBULIN SER CALC-MCNC: 4.5 G/DL (ref 2–4)
GLUCOSE SERPL-MCNC: 89 MG/DL (ref 65–100)
GLUCOSE UR STRIP.AUTO-MCNC: NEGATIVE MG/DL
HCT VFR BLD AUTO: 38.8 % (ref 35–47)
HGB BLD-MCNC: 12.8 G/DL (ref 11.5–16)
HGB UR QL STRIP: NEGATIVE
IMM GRANULOCYTES # BLD AUTO: 0 K/UL (ref 0–0.04)
IMM GRANULOCYTES NFR BLD AUTO: 0 % (ref 0–0.5)
KETONES UR QL STRIP.AUTO: NEGATIVE MG/DL
LEUKOCYTE ESTERASE UR QL STRIP.AUTO: ABNORMAL
LIPASE SERPL-CCNC: 19 U/L (ref 13–75)
LYMPHOCYTES # BLD: 1.4 K/UL (ref 0.8–3.5)
LYMPHOCYTES NFR BLD: 26 % (ref 12–49)
MCH RBC QN AUTO: 27.9 PG (ref 26–34)
MCHC RBC AUTO-ENTMCNC: 33 G/DL (ref 30–36.5)
MCV RBC AUTO: 84.7 FL (ref 80–99)
MONOCYTES # BLD: 0.5 K/UL (ref 0–1)
MONOCYTES NFR BLD: 10 % (ref 5–13)
NEUTS SEG # BLD: 3.4 K/UL (ref 1.8–8)
NEUTS SEG NFR BLD: 62 % (ref 32–75)
NITRITE UR QL STRIP.AUTO: NEGATIVE
NRBC # BLD: 0 K/UL (ref 0–0.01)
NRBC BLD-RTO: 0 PER 100 WBC
PH UR STRIP: 7 (ref 5–8)
PLATELET # BLD AUTO: 248 K/UL (ref 150–400)
PMV BLD AUTO: 11.9 FL (ref 8.9–12.9)
POTASSIUM SERPL-SCNC: 3.7 MMOL/L (ref 3.5–5.1)
POTASSIUM SERPL-SCNC: 4.5 MMOL/L (ref 3.5–5.1)
PROT SERPL-MCNC: 7.7 G/DL (ref 6.4–8.2)
PROT UR STRIP-MCNC: NEGATIVE MG/DL
RBC # BLD AUTO: 4.58 M/UL (ref 3.8–5.2)
RBC #/AREA URNS HPF: ABNORMAL /HPF (ref 0–5)
SODIUM SERPL-SCNC: 135 MMOL/L (ref 136–145)
SP GR UR REFRACTOMETRY: <1.005
URINE CULTURE IF INDICATED: ABNORMAL
UROBILINOGEN UR QL STRIP.AUTO: 0.2 EU/DL (ref 0.2–1)
WBC # BLD AUTO: 5.4 K/UL (ref 3.6–11)
WBC URNS QL MICRO: ABNORMAL /HPF (ref 0–4)
YEAST URNS QL MICRO: PRESENT

## 2023-12-28 PROCEDURE — 96374 THER/PROPH/DIAG INJ IV PUSH: CPT

## 2023-12-28 PROCEDURE — 96361 HYDRATE IV INFUSION ADD-ON: CPT

## 2023-12-28 PROCEDURE — 36415 COLL VENOUS BLD VENIPUNCTURE: CPT

## 2023-12-28 PROCEDURE — 6360000002 HC RX W HCPCS: Performed by: EMERGENCY MEDICINE

## 2023-12-28 PROCEDURE — 83690 ASSAY OF LIPASE: CPT

## 2023-12-28 PROCEDURE — 87086 URINE CULTURE/COLONY COUNT: CPT

## 2023-12-28 PROCEDURE — 2580000003 HC RX 258: Performed by: PHYSICIAN ASSISTANT

## 2023-12-28 PROCEDURE — 81001 URINALYSIS AUTO W/SCOPE: CPT

## 2023-12-28 PROCEDURE — 70450 CT HEAD/BRAIN W/O DYE: CPT

## 2023-12-28 PROCEDURE — 87077 CULTURE AEROBIC IDENTIFY: CPT

## 2023-12-28 PROCEDURE — 85025 COMPLETE CBC W/AUTO DIFF WBC: CPT

## 2023-12-28 PROCEDURE — 80053 COMPREHEN METABOLIC PANEL: CPT

## 2023-12-28 PROCEDURE — 99284 EMERGENCY DEPT VISIT MOD MDM: CPT

## 2023-12-28 PROCEDURE — 84132 ASSAY OF SERUM POTASSIUM: CPT

## 2023-12-28 PROCEDURE — 6370000000 HC RX 637 (ALT 250 FOR IP): Performed by: PHYSICIAN ASSISTANT

## 2023-12-28 RX ORDER — DIAZEPAM 5 MG/ML
5 INJECTION, SOLUTION INTRAMUSCULAR; INTRAVENOUS
Status: COMPLETED | OUTPATIENT
Start: 2023-12-28 | End: 2023-12-28

## 2023-12-28 RX ORDER — FLUCONAZOLE 200 MG/1
200 TABLET ORAL
Status: COMPLETED | OUTPATIENT
Start: 2023-12-28 | End: 2023-12-28

## 2023-12-28 RX ORDER — 0.9 % SODIUM CHLORIDE 0.9 %
1000 INTRAVENOUS SOLUTION INTRAVENOUS ONCE
Status: COMPLETED | OUTPATIENT
Start: 2023-12-28 | End: 2023-12-28

## 2023-12-28 RX ORDER — FLUCONAZOLE 100 MG/1
200 TABLET ORAL DAILY
Qty: 28 TABLET | Refills: 0 | Status: SHIPPED | OUTPATIENT
Start: 2023-12-28 | End: 2024-01-11

## 2023-12-28 RX ADMIN — FLUCONAZOLE 200 MG: 200 TABLET ORAL at 23:35

## 2023-12-28 RX ADMIN — SODIUM CHLORIDE 1000 ML: 9 INJECTION, SOLUTION INTRAVENOUS at 18:56

## 2023-12-28 RX ADMIN — DIAZEPAM 5 MG: 5 INJECTION, SOLUTION INTRAMUSCULAR; INTRAVENOUS at 19:52

## 2023-12-28 ASSESSMENT — PAIN SCALES - WONG BAKER: WONGBAKER_NUMERICALRESPONSE: 8

## 2023-12-28 ASSESSMENT — PAIN - FUNCTIONAL ASSESSMENT: PAIN_FUNCTIONAL_ASSESSMENT: WONG-BAKER FACES

## 2023-12-28 NOTE — TELEPHONE ENCOUNTER
CM called patient by telephone to perform post discharge assessment and for the purpose of follow up call from inpatient discharge to check on environmental challenges/medications/appointment follow up/and questions/concerns. The call was answered by patient/family/caretaker/agency, introduction self, and explanation and reason for call, name and  confirmed. CM spoke with pt's legal guardian, Ms. Divya Siegel. 2nd IM discussed with Ms. Moshe Carrillo. Copy to sent to pt's representative via certified mail. Ms. Moshe Carrillo told CM that pt has been doing okay since being home from the hospital. Ms. Moshe Carrillo has ensured that pt stays clean and is taken care of at home. Ms. Moshe Carrillo told CM that pt has an upcoming appointment with her PCP next week. Ms. Moshe Carrillo has not been able to schedule a neurology appointment for pt yet. CM offered assistance with scheduling the neurology appointment for pt. Ms. Moshe Carrillo agreeable to receiving assistance. CM called [facility] at [facility] and scheduled the below appt for pt. This was the soonest available appt. OCT    24    2024 New Patient with Dr. King Lopez MD  Thursday Oct 24, 2024 1:00 PM Neurology Clinic at [address] [place] [address]  [phone]     CM called Ms. Moshe Carrillo back to inform her of this appt. Ms. Moshe Carrillo did not answer the phone. CM left a VM with the appt date and time and provided Ms. Moshe Carrillo with her call back number if she has any other questions or concerns.     Yuliana Rebolledo, [address] ,   [phone] = swallows foods/liquids without difficulty

## 2023-12-29 ENCOUNTER — TELEPHONE (OUTPATIENT)
Age: 38
End: 2023-12-29

## 2023-12-29 PROCEDURE — 6370000000 HC RX 637 (ALT 250 FOR IP): Performed by: PHYSICIAN ASSISTANT

## 2023-12-29 RX ORDER — MECOBALAMIN 5000 MCG
5 TABLET,DISINTEGRATING ORAL
Status: COMPLETED | OUTPATIENT
Start: 2023-12-29 | End: 2023-12-29

## 2023-12-29 RX ADMIN — Medication 5 MG: at 00:30

## 2023-12-29 NOTE — ED NOTES
Pt d/c with instructions and taken home via [facility]. Pt vss and documented. No redness or swelling at iv site. Caregiver instructed to f/u with pcp. Rx x1 sent to pharm and pt has no additional questions at this time.

## 2023-12-29 NOTE — ED NOTES
Bedside report rcvd. Pt resting on stretcher; nad noted. Pt on monitor x3; vss and documented. Pt  has ns infusing at this time w/o difficulty. Pt awaiting labs results for dispo.

## 2023-12-31 LAB
BACTERIA SPEC CULT: ABNORMAL
CC UR VC: ABNORMAL
SERVICE CMNT-IMP: ABNORMAL

## 2024-01-11 ENCOUNTER — OFFICE VISIT (OUTPATIENT)
Age: 39
End: 2024-01-11
Payer: MEDICARE

## 2024-01-11 VITALS
DIASTOLIC BLOOD PRESSURE: 60 MMHG | TEMPERATURE: 97.6 F | SYSTOLIC BLOOD PRESSURE: 102 MMHG | HEIGHT: 66 IN | WEIGHT: 177 LBS | OXYGEN SATURATION: 100 % | RESPIRATION RATE: 16 BRPM | BODY MASS INDEX: 28.45 KG/M2 | HEART RATE: 57 BPM

## 2024-01-11 DIAGNOSIS — R41.82 ACUTE ALTERATION IN MENTAL STATUS: ICD-10-CM

## 2024-01-11 DIAGNOSIS — G93.41 ACUTE METABOLIC ENCEPHALOPATHY: ICD-10-CM

## 2024-01-11 DIAGNOSIS — G80.9: ICD-10-CM

## 2024-01-11 DIAGNOSIS — F79 MENTAL IMPAIRMENT: ICD-10-CM

## 2024-01-11 DIAGNOSIS — R56.9 CONVULSIONS, UNSPECIFIED CONVULSION TYPE (HCC): Primary | ICD-10-CM

## 2024-01-11 PROCEDURE — 99204 OFFICE O/P NEW MOD 45 MIN: CPT | Performed by: PSYCHIATRY & NEUROLOGY

## 2024-01-11 RX ORDER — ONDANSETRON 4 MG/1
4 TABLET, ORALLY DISINTEGRATING ORAL EVERY 8 HOURS PRN
COMMUNITY

## 2024-01-11 RX ORDER — CEPHALEXIN 500 MG/1
CAPSULE ORAL
COMMUNITY
Start: 2024-01-03 | End: 2024-01-11 | Stop reason: CLARIF

## 2024-01-11 RX ORDER — ASCORBIC ACID 500 MG
1000 TABLET ORAL DAILY
COMMUNITY

## 2024-01-11 RX ORDER — NITROFURANTOIN MACROCRYSTALS 50 MG/1
50 CAPSULE ORAL DAILY
COMMUNITY
Start: 2024-01-08

## 2024-01-11 RX ORDER — DIPHENHYDRAMINE HCL 25 MG/1
TABLET, FILM COATED ORAL
COMMUNITY

## 2024-01-11 ASSESSMENT — PATIENT HEALTH QUESTIONNAIRE - PHQ9
1. LITTLE INTEREST OR PLEASURE IN DOING THINGS: 0
2. FEELING DOWN, DEPRESSED OR HOPELESS: 0
SUM OF ALL RESPONSES TO PHQ QUESTIONS 1-9: 0
SUM OF ALL RESPONSES TO PHQ9 QUESTIONS 1 & 2: 0
SUM OF ALL RESPONSES TO PHQ QUESTIONS 1-9: 0

## 2024-01-12 NOTE — PROGRESS NOTES
Consult  REFERRED BY:  Chelsea Lozada MD    CHIEF COMPLAINT: Possible seizures when seen in urgent work in basis to evaluate this problem.      Subjective:     Clarissa Vazquez is a 38 y.o. right-handed -American female we are seeing as a new patient, on urgent work in basis, for possible seizures because of abnormal behavior and agitation that occurred when she was found to have a recent urinary tract infection a week ago, and she gets this quite frequently, and apparently gives it almost every month, she has been referred to a urologist which she really needs, if she is on Macrodantin 50 mg once a day as urinary suppression, but probably needs 100 mg twice a day with her problems.  She does not have an indwelling Pretty catheter.  She has severe mental impairment and basically is nonverbal and wheelchair and bed ridden and has chronic choreoathetoid movements as a baseline, and her mother says from baseline is stable now, and this is what she does most of the time and her mother does not think she has seizures, and she was followed by [facility] neurology for years, and had a normal EEG in 2017, and just had a normal CT of the head done in the hospital last month, and the mother says as long as they treat her urinary tract infections her agitation is handled with her current medication but would like a referral to psychiatry to see if anything else can be done, but she wants to talk to her sisters first, so recommended she talk to her sisters and we gave her a list of psychiatrist and she can see them for behavioral management, as the patient is already doing fairly well on clonazepam 1 mg in the morning and 3 mg at night has tolerated this dose well, and did end up with agitation and withdrawal 1 that was suddenly stopped by accident.  She also takes trazodone for sleep which helps.  Her mother says she is sleeping okay unless she gets agitated from the urinary tract infection.  Her mother does not want any

## 2024-01-14 PROBLEM — N39.0 UTI (URINARY TRACT INFECTION): Status: RESOLVED | Noted: 2023-12-15 | Resolved: 2024-01-14

## 2024-07-05 ENCOUNTER — HOSPITAL ENCOUNTER (OUTPATIENT)
Facility: HOSPITAL | Age: 39
Discharge: HOME | End: 2024-07-05
Attending: INTERNAL MEDICINE | Admitting: STUDENT IN AN ORGANIZED HEALTH CARE EDUCATION/TRAINING PROGRAM
Payer: MEDICARE

## 2024-07-05 VITALS
DIASTOLIC BLOOD PRESSURE: 87 MMHG | OXYGEN SATURATION: 98 % | SYSTOLIC BLOOD PRESSURE: 126 MMHG | TEMPERATURE: 97.9 F | RESPIRATION RATE: 18 BRPM | HEART RATE: 78 BPM

## 2024-07-05 DIAGNOSIS — R63.39 FEEDING PROBLEM: ICD-10-CM

## 2024-07-05 PROCEDURE — C1769 GUIDE WIRE: HCPCS

## 2024-07-05 NOTE — DISCHARGE INSTRUCTIONS
Should you experience any significant changes, please call [phone] between the hours of 7:30 am and 3:30 pm or [phone] after hours. After hours, ask the  to page the X-ray Technologist, and describe the problem to the technologist. If you are experiencing chest pain, shortness of breath, altered mental state, unusual bleeding or any other emergent symptom you should call 911 immediately.

## 2024-07-05 NOTE — PROGRESS NOTES
Pt arrives in home wheelchair to angio department accompanied by aunt (guardian) for GJ tube replacement procedure. All assessments completed and consent was reviewed.  Education given was regarding procedure, post-procedure care and  management/follow-up. Opportunity for questions was provided and all questions and concerns were addressed.

## 2025-02-14 ENCOUNTER — HOSPITAL ENCOUNTER (OUTPATIENT)
Facility: HOSPITAL | Age: 40
Discharge: HOME | End: 2025-02-14
Attending: INTERNAL MEDICINE | Admitting: RADIOLOGY
Payer: MEDICARE

## 2025-02-14 VITALS — OXYGEN SATURATION: 99 % | HEART RATE: 87 BPM | RESPIRATION RATE: 18 BRPM

## 2025-02-14 DIAGNOSIS — K31.84 GASTROPARESIS: ICD-10-CM

## 2025-02-14 PROCEDURE — 2709999900 IR CHANGE G/J TUBE REPOSITION

## 2025-02-14 PROCEDURE — 2709999900 HC NON-CHARGEABLE SUPPLY

## 2025-02-14 NOTE — PROGRESS NOTES
Pt arrives via power chair to angio department accompanied by a for G tube exchange procedure. All assessments completed and consent was reviewed.  Education given was regarding procedure, post-procedure care and  management/follow-up. Opportunity for questions was provided and all questions and concerns were addressed.

## 2025-04-23 ENCOUNTER — TRANSCRIBE ORDERS (OUTPATIENT)
Facility: HOSPITAL | Age: 40
End: 2025-04-23

## 2025-04-23 DIAGNOSIS — Z93.1 FEEDING BY G-TUBE (HCC): Primary | ICD-10-CM

## 2025-04-23 DIAGNOSIS — T85.598A FEEDING TUBE DYSFUNCTION, INITIAL ENCOUNTER: ICD-10-CM

## 2025-04-28 ENCOUNTER — HOSPITAL ENCOUNTER (OUTPATIENT)
Facility: HOSPITAL | Age: 40
Discharge: HOME | End: 2025-04-28
Attending: INTERNAL MEDICINE | Admitting: STUDENT IN AN ORGANIZED HEALTH CARE EDUCATION/TRAINING PROGRAM
Payer: MEDICARE

## 2025-04-28 VITALS
OXYGEN SATURATION: 96 % | RESPIRATION RATE: 20 BRPM | HEART RATE: 81 BPM | SYSTOLIC BLOOD PRESSURE: 116 MMHG | DIASTOLIC BLOOD PRESSURE: 71 MMHG

## 2025-04-28 DIAGNOSIS — T85.598A FEEDING TUBE DYSFUNCTION, INITIAL ENCOUNTER: ICD-10-CM

## 2025-04-28 DIAGNOSIS — Z93.1 FEEDING BY G-TUBE (HCC): ICD-10-CM

## 2025-04-28 PROCEDURE — 2709999900 IR GUIDED REPLACE DUOD OR JEJUN TUBE W CONTRAST

## 2025-04-28 PROCEDURE — 49451 REPLACE DUOD/JEJ TUBE PERC: CPT | Performed by: INTERNAL MEDICINE

## 2025-04-28 RX ORDER — ASPIRIN 81 MG
1 TABLET,CHEWABLE ORAL DAILY PRN
COMMUNITY

## 2025-04-28 RX ORDER — LIDOCAINE 4 G/G
1 PATCH TOPICAL DAILY
COMMUNITY

## 2025-04-28 RX ORDER — KETOCONAZOLE 20 MG/G
CREAM TOPICAL DAILY
COMMUNITY

## 2025-04-28 RX ORDER — CITALOPRAM HYDROBROMIDE 20 MG/10ML
10 SOLUTION, ORAL ORAL NIGHTLY
COMMUNITY
Start: 2025-01-22

## 2025-04-28 RX ORDER — FLUTICASONE PROPIONATE 50 MCG
1 SPRAY, SUSPENSION (ML) NASAL DAILY
COMMUNITY

## 2025-04-28 RX ORDER — POLYMYXIN B SULFATE AND TRIMETHOPRIM 1; 10000 MG/ML; [USP'U]/ML
SOLUTION OPHTHALMIC
COMMUNITY
Start: 2025-04-01

## 2025-04-28 RX ORDER — BACLOFEN 20 MG/1
20 TABLET ORAL 4 TIMES DAILY
COMMUNITY
Start: 2025-04-07

## 2025-04-28 NOTE — DISCHARGE INSTRUCTIONS
Learning About Living With a Feeding Tube  What is tube feeding?  Your body needs nutrition to stay strong and help you live a healthy life. If you're unable to eat, or if you have an illness that makes it hard to swallow food, you may need a feeding tube. The tube is placed in the stomach and is used to give food, liquids, and medicines.  Depending on why you need a feeding tube, you may have it for several weeks or months or longer.  When you first get a feeding tube, your biggest challenge may be your new relationship with food. For many people, eating and savoring food is one of the most pleasing parts of daily life. You may grieve the loss of the daily habit of eating and the social aspects of sharing food with others.  If you've struggled to get enough nutrition--if it's been hard to eat or swallow--having a feeding tube can help you regain your health and strength. And understanding how a feeding tube works is a first step toward dealing with changes that come with having the tube. It can also help you avoid common problems that can occur.  What can you expect when you have a feeding tube?  After surgery to insert a feeding tube, you'll have a 6- to 12-inch tube coming out of your belly. The tube is about the same width as a pen.  There are different ways the tube can be used for feeding. Your doctor will help you decide which is best for you and how often feedings should occur.  A feeding syringe.  A syringe is connected to the tube. A nutritional mixture (formula) is put into the syringe and flows into the tube and your stomach. This is called bolus feeding.  A gravity bag.  Formula is placed into a special bag that is hung on a hook or a pole. The height and weight of the bag make the food flow down the tube and into your stomach.  A bag and pump.  A pump is used to push formula from a bag through the tube. This is also called continuous feeding.  How do you use a feeding tube?  It's important that